# Patient Record
Sex: MALE | Race: WHITE | NOT HISPANIC OR LATINO | Employment: OTHER | URBAN - METROPOLITAN AREA
[De-identification: names, ages, dates, MRNs, and addresses within clinical notes are randomized per-mention and may not be internally consistent; named-entity substitution may affect disease eponyms.]

---

## 2021-07-08 ENCOUNTER — HOSPITAL ENCOUNTER (INPATIENT)
Facility: HOSPITAL | Age: 76
LOS: 6 days | Discharge: HOME | End: 2021-07-14
Attending: EMERGENCY MEDICINE | Admitting: INTERNAL MEDICINE

## 2021-07-08 DIAGNOSIS — I21.01 ST ELEVATION MYOCARDIAL INFARCTION INVOLVING LEFT MAIN CORONARY ARTERY (HCC): ICD-10-CM

## 2021-07-08 DIAGNOSIS — I21.3 ST ELEVATION MYOCARDIAL INFARCTION (STEMI), UNSPECIFIED ARTERY (HCC): Primary | ICD-10-CM

## 2021-07-08 LAB
ACT BLD: 169 SECONDS (ref 89–137)
ACT BLD: 257 SECONDS (ref 89–137)
ACT BLD: 268 SECONDS (ref 89–137)
ACT BLD: 279 SECONDS (ref 89–137)
ALBUMIN SERPL-MCNC: 5 G/DL (ref 3.5–5.2)
ALBUMIN/GLOB SERPL: 1.7 G/DL
ALP SERPL-CCNC: 71 U/L (ref 39–117)
ALT SERPL W P-5'-P-CCNC: 35 U/L (ref 1–41)
ANION GAP SERPL CALCULATED.3IONS-SCNC: 16 MMOL/L (ref 5–15)
APTT PPP: 27.6 SECONDS (ref 61–76.5)
AST SERPL-CCNC: 96 U/L (ref 1–40)
BASOPHILS # BLD AUTO: 0 10*3/MM3 (ref 0–0.2)
BASOPHILS NFR BLD AUTO: 0.3 % (ref 0–1.5)
BILIRUB SERPL-MCNC: 1 MG/DL (ref 0–1.2)
BUN SERPL-MCNC: 8 MG/DL (ref 8–23)
BUN/CREAT SERPL: 10.4 (ref 7–25)
CALCIUM SPEC-SCNC: 9.3 MG/DL (ref 8.6–10.5)
CHLORIDE SERPL-SCNC: 96 MMOL/L (ref 98–107)
CO2 SERPL-SCNC: 27 MMOL/L (ref 22–29)
CREAT SERPL-MCNC: 0.77 MG/DL (ref 0.76–1.27)
DEPRECATED RDW RBC AUTO: 42 FL (ref 37–54)
EOSINOPHIL # BLD AUTO: 0 10*3/MM3 (ref 0–0.4)
EOSINOPHIL NFR BLD AUTO: 0.1 % (ref 0.3–6.2)
ERYTHROCYTE [DISTWIDTH] IN BLOOD BY AUTOMATED COUNT: 13.4 % (ref 12.3–15.4)
GFR SERPL CREATININE-BSD FRML MDRD: 98 ML/MIN/1.73
GLOBULIN UR ELPH-MCNC: 2.9 GM/DL
GLUCOSE SERPL-MCNC: 131 MG/DL (ref 65–99)
HCT VFR BLD AUTO: 46.3 % (ref 37.5–51)
HGB BLD-MCNC: 15.8 G/DL (ref 13–17.7)
HOLD SPECIMEN: NORMAL
HOLD SPECIMEN: NORMAL
INR PPP: 1.06 (ref 0.93–1.1)
LYMPHOCYTES # BLD AUTO: 0.8 10*3/MM3 (ref 0.7–3.1)
LYMPHOCYTES NFR BLD AUTO: 7 % (ref 19.6–45.3)
MAGNESIUM SERPL-MCNC: 1.9 MG/DL (ref 1.6–2.4)
MCH RBC QN AUTO: 30.2 PG (ref 26.6–33)
MCHC RBC AUTO-ENTMCNC: 34.1 G/DL (ref 31.5–35.7)
MCV RBC AUTO: 88.3 FL (ref 79–97)
MONOCYTES # BLD AUTO: 0.9 10*3/MM3 (ref 0.1–0.9)
MONOCYTES NFR BLD AUTO: 7.4 % (ref 5–12)
NEUTROPHILS NFR BLD AUTO: 10.2 10*3/MM3 (ref 1.7–7)
NEUTROPHILS NFR BLD AUTO: 85.2 % (ref 42.7–76)
NRBC BLD AUTO-RTO: 0 /100 WBC (ref 0–0.2)
PLATELET # BLD AUTO: 149 10*3/MM3 (ref 140–450)
PMV BLD AUTO: 8.5 FL (ref 6–12)
POTASSIUM SERPL-SCNC: 3.9 MMOL/L (ref 3.5–5.2)
PROT SERPL-MCNC: 7.9 G/DL (ref 6–8.5)
PROTHROMBIN TIME: 11.7 SECONDS (ref 9.6–11.7)
RBC # BLD AUTO: 5.24 10*6/MM3 (ref 4.14–5.8)
SARS-COV-2 RNA PNL SPEC NAA+PROBE: NOT DETECTED
SODIUM SERPL-SCNC: 139 MMOL/L (ref 136–145)
TROPONIN T SERPL-MCNC: 1.8 NG/ML (ref 0–0.03)
WBC # BLD AUTO: 12 10*3/MM3 (ref 3.4–10.8)
WHOLE BLOOD HOLD SPECIMEN: NORMAL

## 2021-07-08 PROCEDURE — 25010000002 HEPARIN (PORCINE) PER 1000 UNITS: Performed by: INTERNAL MEDICINE

## 2021-07-08 PROCEDURE — 92978 ENDOLUMINL IVUS OCT C 1ST: CPT | Performed by: INTERNAL MEDICINE

## 2021-07-08 PROCEDURE — C1725 CATH, TRANSLUMIN NON-LASER: HCPCS | Performed by: INTERNAL MEDICINE

## 2021-07-08 PROCEDURE — C1874 STENT, COATED/COV W/DEL SYS: HCPCS | Performed by: INTERNAL MEDICINE

## 2021-07-08 PROCEDURE — 92979 ENDOLUMINL IVUS OCT C EA: CPT | Performed by: INTERNAL MEDICINE

## 2021-07-08 PROCEDURE — 93005 ELECTROCARDIOGRAM TRACING: CPT | Performed by: EMERGENCY MEDICINE

## 2021-07-08 PROCEDURE — C1894 INTRO/SHEATH, NON-LASER: HCPCS | Performed by: INTERNAL MEDICINE

## 2021-07-08 PROCEDURE — 93005 ELECTROCARDIOGRAM TRACING: CPT

## 2021-07-08 PROCEDURE — 0 IOPAMIDOL PER 1 ML: Performed by: INTERNAL MEDICINE

## 2021-07-08 PROCEDURE — 99223 1ST HOSP IP/OBS HIGH 75: CPT | Performed by: INTERNAL MEDICINE

## 2021-07-08 PROCEDURE — 87635 SARS-COV-2 COVID-19 AMP PRB: CPT | Performed by: EMERGENCY MEDICINE

## 2021-07-08 PROCEDURE — 99152 MOD SED SAME PHYS/QHP 5/>YRS: CPT | Performed by: INTERNAL MEDICINE

## 2021-07-08 PROCEDURE — 85025 COMPLETE CBC W/AUTO DIFF WBC: CPT | Performed by: EMERGENCY MEDICINE

## 2021-07-08 PROCEDURE — 80053 COMPREHEN METABOLIC PANEL: CPT | Performed by: EMERGENCY MEDICINE

## 2021-07-08 PROCEDURE — C1769 GUIDE WIRE: HCPCS | Performed by: INTERNAL MEDICINE

## 2021-07-08 PROCEDURE — C1760 CLOSURE DEV, VASC: HCPCS | Performed by: INTERNAL MEDICINE

## 2021-07-08 PROCEDURE — C1753 CATH, INTRAVAS ULTRASOUND: HCPCS | Performed by: INTERNAL MEDICINE

## 2021-07-08 PROCEDURE — 027036Z DILATION OF CORONARY ARTERY, ONE ARTERY WITH THREE DRUG-ELUTING INTRALUMINAL DEVICES, PERCUTANEOUS APPROACH: ICD-10-PCS | Performed by: INTERNAL MEDICINE

## 2021-07-08 PROCEDURE — 93458 L HRT ARTERY/VENTRICLE ANGIO: CPT | Performed by: INTERNAL MEDICINE

## 2021-07-08 PROCEDURE — 99153 MOD SED SAME PHYS/QHP EA: CPT | Performed by: INTERNAL MEDICINE

## 2021-07-08 PROCEDURE — 92941 PRQ TRLML REVSC TOT OCCL AMI: CPT | Performed by: INTERNAL MEDICINE

## 2021-07-08 PROCEDURE — 99283 EMERGENCY DEPT VISIT LOW MDM: CPT

## 2021-07-08 PROCEDURE — 85730 THROMBOPLASTIN TIME PARTIAL: CPT | Performed by: EMERGENCY MEDICINE

## 2021-07-08 PROCEDURE — 85347 COAGULATION TIME ACTIVATED: CPT

## 2021-07-08 PROCEDURE — C1887 CATHETER, GUIDING: HCPCS | Performed by: INTERNAL MEDICINE

## 2021-07-08 PROCEDURE — C9606 PERC D-E COR REVASC W AMI S: HCPCS | Performed by: INTERNAL MEDICINE

## 2021-07-08 PROCEDURE — 85610 PROTHROMBIN TIME: CPT | Performed by: EMERGENCY MEDICINE

## 2021-07-08 PROCEDURE — 4A023N7 MEASUREMENT OF CARDIAC SAMPLING AND PRESSURE, LEFT HEART, PERCUTANEOUS APPROACH: ICD-10-PCS | Performed by: INTERNAL MEDICINE

## 2021-07-08 PROCEDURE — B2151ZZ FLUOROSCOPY OF LEFT HEART USING LOW OSMOLAR CONTRAST: ICD-10-PCS | Performed by: INTERNAL MEDICINE

## 2021-07-08 PROCEDURE — B2111ZZ FLUOROSCOPY OF MULTIPLE CORONARY ARTERIES USING LOW OSMOLAR CONTRAST: ICD-10-PCS | Performed by: INTERNAL MEDICINE

## 2021-07-08 PROCEDURE — 83735 ASSAY OF MAGNESIUM: CPT | Performed by: EMERGENCY MEDICINE

## 2021-07-08 PROCEDURE — 84484 ASSAY OF TROPONIN QUANT: CPT | Performed by: EMERGENCY MEDICINE

## 2021-07-08 DEVICE — XIENCE SIERRA™ EVEROLIMUS ELUTING CORONARY STENT SYSTEM 3.50 MM X 28 MM / RAPID-EXCHANGE
Type: IMPLANTABLE DEVICE | Status: FUNCTIONAL
Brand: XIENCE SIERRA™

## 2021-07-08 RX ORDER — CARVEDILOL 3.12 MG/1
3.12 TABLET ORAL EVERY 12 HOURS SCHEDULED
Status: DISCONTINUED | OUTPATIENT
Start: 2021-07-08 | End: 2021-07-09

## 2021-07-08 RX ORDER — ONDANSETRON 2 MG/ML
4 INJECTION INTRAMUSCULAR; INTRAVENOUS ONCE
Status: DISCONTINUED | OUTPATIENT
Start: 2021-07-08 | End: 2021-07-14 | Stop reason: HOSPADM

## 2021-07-08 RX ORDER — ACETAMINOPHEN 325 MG/1
650 TABLET ORAL EVERY 4 HOURS PRN
Status: DISCONTINUED | OUTPATIENT
Start: 2021-07-08 | End: 2021-07-14 | Stop reason: HOSPADM

## 2021-07-08 RX ORDER — HEPARIN SODIUM 1000 [USP'U]/ML
INJECTION, SOLUTION INTRAVENOUS; SUBCUTANEOUS AS NEEDED
Status: DISCONTINUED | OUTPATIENT
Start: 2021-07-08 | End: 2021-07-08 | Stop reason: HOSPADM

## 2021-07-08 RX ORDER — ASPIRIN 81 MG/1
81 TABLET, CHEWABLE ORAL DAILY
Status: DISCONTINUED | OUTPATIENT
Start: 2021-07-09 | End: 2021-07-14 | Stop reason: HOSPADM

## 2021-07-08 RX ORDER — ATORVASTATIN CALCIUM 20 MG/1
20 TABLET, FILM COATED ORAL NIGHTLY
Status: DISCONTINUED | OUTPATIENT
Start: 2021-07-08 | End: 2021-07-14 | Stop reason: HOSPADM

## 2021-07-08 RX ORDER — ONDANSETRON 2 MG/ML
INJECTION INTRAMUSCULAR; INTRAVENOUS
Status: DISPENSED
Start: 2021-07-08 | End: 2021-07-09

## 2021-07-08 RX ORDER — FENTANYL CITRATE 50 UG/ML
INJECTION, SOLUTION INTRAMUSCULAR; INTRAVENOUS
Status: DISPENSED
Start: 2021-07-08 | End: 2021-07-09

## 2021-07-08 RX ORDER — HEPARIN SODIUM 10000 [USP'U]/100ML
11.9 INJECTION, SOLUTION INTRAVENOUS
Status: DISCONTINUED | OUTPATIENT
Start: 2021-07-08 | End: 2021-07-08

## 2021-07-08 RX ORDER — LIDOCAINE HYDROCHLORIDE 20 MG/ML
INJECTION, SOLUTION INFILTRATION; PERINEURAL AS NEEDED
Status: DISCONTINUED | OUTPATIENT
Start: 2021-07-08 | End: 2021-07-08 | Stop reason: HOSPADM

## 2021-07-08 RX ORDER — NITROGLYCERIN 20 MG/100ML
10-50 INJECTION INTRAVENOUS
Status: DISCONTINUED | OUTPATIENT
Start: 2021-07-08 | End: 2021-07-09

## 2021-07-08 RX ORDER — ASPIRIN 81 MG/1
TABLET, CHEWABLE ORAL
Status: DISPENSED
Start: 2021-07-08 | End: 2021-07-09

## 2021-07-08 RX ORDER — HEPARIN SODIUM 1000 [USP'U]/ML
4000 INJECTION, SOLUTION INTRAVENOUS; SUBCUTANEOUS ONCE
Status: DISCONTINUED | OUTPATIENT
Start: 2021-07-08 | End: 2021-07-11

## 2021-07-08 RX ORDER — LISINOPRIL 5 MG/1
5 TABLET ORAL
Status: DISCONTINUED | OUTPATIENT
Start: 2021-07-09 | End: 2021-07-09

## 2021-07-08 RX ORDER — SODIUM CHLORIDE 9 MG/ML
50 INJECTION, SOLUTION INTRAVENOUS CONTINUOUS
Status: DISCONTINUED | OUTPATIENT
Start: 2021-07-08 | End: 2021-07-09

## 2021-07-08 RX ORDER — NITROGLYCERIN 20 MG/100ML
INJECTION INTRAVENOUS
Status: DISPENSED
Start: 2021-07-08 | End: 2021-07-09

## 2021-07-08 RX ORDER — FENTANYL CITRATE 50 UG/ML
50 INJECTION, SOLUTION INTRAMUSCULAR; INTRAVENOUS ONCE
Status: DISCONTINUED | OUTPATIENT
Start: 2021-07-08 | End: 2021-07-14 | Stop reason: HOSPADM

## 2021-07-08 RX ORDER — SODIUM CHLORIDE 0.9 % (FLUSH) 0.9 %
10 SYRINGE (ML) INJECTION AS NEEDED
Status: DISCONTINUED | OUTPATIENT
Start: 2021-07-08 | End: 2021-07-14 | Stop reason: HOSPADM

## 2021-07-08 RX ORDER — EPTIFIBATIDE 0.75 MG/ML
INJECTION, SOLUTION INTRAVENOUS
Status: DISCONTINUED
Start: 2021-07-08 | End: 2021-07-08 | Stop reason: WASHOUT

## 2021-07-08 RX ORDER — ASPIRIN 81 MG/1
243 TABLET, CHEWABLE ORAL ONCE
Status: DISCONTINUED | OUTPATIENT
Start: 2021-07-08 | End: 2021-07-14 | Stop reason: HOSPADM

## 2021-07-08 RX ORDER — HEPARIN SODIUM 1000 [USP'U]/ML
INJECTION, SOLUTION INTRAVENOUS; SUBCUTANEOUS
Status: DISPENSED
Start: 2021-07-08 | End: 2021-07-09

## 2021-07-08 RX ADMIN — ATORVASTATIN CALCIUM 20 MG: 20 TABLET, FILM COATED ORAL at 21:46

## 2021-07-08 RX ADMIN — CARVEDILOL 3.12 MG: 3.12 TABLET, FILM COATED ORAL at 21:46

## 2021-07-08 NOTE — H&P
cardiology H&P  Shay Mirza MD, PhD  [facility] cardiology    No care team member to display    CHIEF COMPLAINT: Chest pain, anterior STEMI    HISTORY OF PRESENT ILLNESS:    This very pleasant 76-year-old gentleman who presented to the ER with acute onset of chest pain finding anterolateral ST elevation concerning for myocardial infarction the setting of chest pain. Patient was taken emergently to the Cath Lab demonstrating 99% stenosis of the mid LAD status post intervention with Xience 3.5 x 28 drug-eluting stent postdilated to 4.2 and 4.6 mm distal proximal respectively. He did well. Ostium of the left main was also viewed to be stenotic however intravascular ultrasound revealed cross-sectional area most narrowed at 6.4 mm² and 6.8 mm^2 on 2 successive measurements not meeting criteria for intervention. Patient left the Cath Lab hemodynamically electrically stable. He is being transferred to ICU for continued care. Telemetry monitoring standard post MI guideline directed therapy    Historically patient acts younger than he really as per family, mows the yard in 90 degree heat, takes no medicines at home, has otherwise been healthy    Non-smoker not diabetic    Review of systems otherwise negative x14 point review of systems except as mentioned above    Historical data copied forward from previous encounters and EMR is unchanged      No past medical history on file.  No past surgical history on file.  No family history on file.  Social History     Tobacco Use   • Smoking status: Not on file   Substance Use Topics   • Alcohol use: Not on file   • Drug use: Not on file     No medications prior to admission.     Allergies:  Patient has no known allergies.    REVIEW OF SYSTEMS:  Please see the above history of present illness for pertinent positives and negatives.  The remainder of the patient's systems have been reviewed and are negative.     Vital Signs  Temp:  [97.7 °F (36.5 °C)] 97.7 °F (36.5 °C)  Heart  "Rate:  [70] 70  Resp:  [16] 16  BP: (152-182)/(79-92) 152/92    Flowsheet Rows      First Filed Value   Admission Height  172.7 cm (68\") Documented at 07/08/2021 1658   Admission Weight  83.9 kg (185 lb) Documented at 07/08/2021 1658           Physical Exam:  Physical Exam   Constitutional: Patient appears well-developed appears younger than stated age and well-nourished and mild distress on initial encounter  HEENT:   Head: Normocephalic and atraumatic.   Eyes:  Pupils are equal, round, and reactive to light. EOM are intact. Sclerae are anicteric and noninjected.  Mouth and Throat: Patient has moist mucous membranes. Oropharynx is clear of any erythema or exudate.     Neck: Neck supple. No JVD present. No thyromegaly present. No lymphadenopathy present.  Cardiovascular: Regular rate, regular rhythm, S1 normal and S2 normal.  Exam reveals no gallop and no friction rub.  No murmur heard auscultation.  Pulmonary/Chest: Lungs are clear to auscultation bilaterally. No respiratory distress. No wheezes. No rhonchi. No rales.   Abdominal: Soft. Bowel sounds are normal. No distension and no mass. There is no hepatosplenomegaly. There is no tenderness.   Musculoskeletal: Normal muscle tone  Extremities: No edema. Pulses are palpable in all 4 extremities.  Neurological: Patient is alert and oriented to person, place, and time. Cranial nerves II-XII are grossly intact with no focal deficits.  Skin: Skin is warm. No rash noted. Nails show no clubbing.  No cyanosis or erythema.    Right inguinal hernia present nonincarcerated     Results Review:    I reviewed the patient's new clinical results.  Lab Results (most recent)     Procedure Component Value Units Date/Time    POC Activated Clotting Time [[id number]]  (Abnormal) Collected: 07/08/21 1905    Specimen: Arterial Blood Updated: 07/08/21 1917     Activated Clotting Time  268 Seconds      Comment: Serial Number: [id number]:  [id number]       POC Activated Clotting Time " [[id number]]  (Abnormal) Collected: 07/08/21 1847    Specimen: Arterial Blood Updated: 07/08/21 1917     Activated Clotting Time  257 Seconds      Comment: Serial Number: [id number]:  [id number]       aPTT [[id number]]  (Abnormal) Collected: 07/08/21 1734    Specimen: Blood Updated: 07/08/21 1853     PTT 27.6 seconds     [place] Draw [[id number]] Collected: 07/08/21 1734    Specimen: Blood Updated: 07/08/21 1845    Narrative:      The following orders were created for panel order [place] Draw.  Procedure                               Abnormality         Status                     ---------                               -----------         ------                     Green Top (Gel)[[id number]]                                  Final result               Lavender Top[[id number]]                                     Final result               Gold Top - SST[[id number]]                                   Final result                 Please view results for these tests on the individual orders.    Lavender Top [[id number]] Collected: 07/08/21 1734    Specimen: Blood Updated: 07/08/21 1845     Extra Tube hold for add-on     Comment: Auto resulted       Gold Top - SST [[id number]] Collected: 07/08/21 1734    Specimen: Blood Updated: 07/08/21 1845     Extra Tube Hold for add-ons.     Comment: Auto resulted.       Green Top (Gel) [[id number]] Collected: 07/08/21 1734    Specimen: Blood Updated: 07/08/21 1845     Extra Tube Hold for add-ons.     Comment: Auto resulted.       Troponin [[id number]]  (Abnormal) Collected: 07/08/21 1734    Specimen: Blood Updated: 07/08/21 1839     Troponin T 1.800 ng/mL     Narrative:      Troponin T Reference Range:  <= 0.03 ng/mL-   Negative for AMI  >0.03 ng/mL-     Abnormal for myocardial necrosis.  Clinicians would have to utilize clinical acumen, EKG, Troponin and serial changes to determine if it is an Acute Myocardial Infarction or myocardial injury due to an underlying chronic condition.        Results may be falsely decreased if patient taking Biotin.      Comprehensive Metabolic Panel [[id number]]  (Abnormal) Collected: 07/08/21 1734    Specimen: Blood Updated: 07/08/21 1832     Glucose 131 mg/dL      BUN 8 mg/dL      Creatinine 0.77 mg/dL      Sodium 139 mmol/L      Potassium 3.9 mmol/L      Chloride 96 mmol/L      CO2 27.0 mmol/L      Calcium 9.3 mg/dL      Total Protein 7.9 g/dL      Albumin 5.00 g/dL      ALT (SGPT) 35 U/L      AST (SGOT) 96 U/L      Alkaline Phosphatase 71 U/L      Total Bilirubin 1.0 mg/dL      eGFR Non African Amer 98 mL/min/1.73      Globulin 2.9 gm/dL      A/G Ratio 1.7 g/dL      BUN/Creatinine Ratio 10.4     Anion Gap 16.0 mmol/L     Narrative:      GFR Normal >60  Chronic Kidney Disease <60  Kidney Failure <15      Magnesium [[id number]]  (Normal) Collected: 07/08/21 1734    Specimen: Blood Updated: 07/08/21 1832     Magnesium 1.9 mg/dL     COVID PRE-OP / PRE-PROCEDURE SCREENING ORDER (NO ISOLATION) - Swab, Nasopharynx [[id number]]  (Normal) Collected: 07/08/21 1733    Specimen: Swab from Nasopharynx Updated: 07/08/21 1758    Narrative:      The following orders were created for panel order COVID PRE-OP / PRE-PROCEDURE SCREENING ORDER (NO ISOLATION) - Swab, Nasopharynx.  Procedure                               Abnormality         Status                     ---------                               -----------         ------                     COVID-19,CEPHEID,COR/DEVIN...[[id number]]  Normal              Final result                 Please view results for these tests on the individual orders.    COVID-19,CEPHEID,COR/DEVIN/PAD/JIMMY IN-HOUSE(OR EMERGENT/ADD-ON),NP SWAB IN TRANSPORT MEDIA 3-4 HR TAT, RT-PCR - Swab, Nasopharynx [[id number]]  (Normal) Collected: 07/08/21 1733    Specimen: Swab from Nasopharynx Updated: 07/08/21 1758     COVID19 Not Detected    Narrative:      Fact sheet for providers: https://www.fda.gov/media/455144/download     Fact sheet for patients:  https://www.fda.gov/media/129678/download  Fact sheet for providers: https://www.fda.gov/media/738438/download    Fact sheet for patients: https://www.fda.gov/media/933986/download    Test performed by PCR.    Protime-INR [[id number]]  (Normal) Collected: 07/08/21 1734    Specimen: Blood Updated: 07/08/21 1758     Protime 11.7 Seconds      INR 1.06    CBC & Differential [[id number]]  (Abnormal) Collected: 07/08/21 1734    Specimen: Blood Updated: 07/08/21 1748    Narrative:      The following orders were created for panel order CBC & Differential.  Procedure                               Abnormality         Status                     ---------                               -----------         ------                     CBC Auto Differential[[id number]]        Abnormal            Final result                 Please view results for these tests on the individual orders.    CBC Auto Differential [[id number]]  (Abnormal) Collected: 07/08/21 1734    Specimen: Blood Updated: 07/08/21 1748     WBC 12.00 10*3/mm3      RBC 5.24 10*6/mm3      Hemoglobin 15.8 g/dL      Hematocrit 46.3 %      MCV 88.3 fL      MCH 30.2 pg      MCHC 34.1 g/dL      RDW 13.4 %      RDW-SD 42.0 fl      MPV 8.5 fL      Platelets 149 10*3/mm3      Neutrophil % 85.2 %      Lymphocyte % 7.0 %      Monocyte % 7.4 %      Eosinophil % 0.1 %      Basophil % 0.3 %      Neutrophils, Absolute 10.20 10*3/mm3      Lymphocytes, Absolute 0.80 10*3/mm3      Monocytes, Absolute 0.90 10*3/mm3      Eosinophils, Absolute 0.00 10*3/mm3      Basophils, Absolute 0.00 10*3/mm3      nRBC 0.0 /100 WBC           Imaging Results (Most Recent)     None        reviewed    ECG/EMG Results (most recent)     Procedure Component Value Units Date/Time    ECG 12 Lead [[id number]] Collected: 07/08/21 1717     Updated: 07/08/21 1718     QT Interval 377 ms     Narrative:      HEART RATE= 64  bpm  RR Interval= 944  ms  IN Interval= 228  ms  P Horizontal Axis= 20  deg  P Front Axis= 18   deg  QRSD Interval= 93  ms  QT Interval= 377  ms  QRS Axis= 38  deg  T Wave Axis= -3  deg  - ABNORMAL ECG -  Sinus rhythm  Prolonged CT interval  ST elevation, consider anterolateral injury  No previous ECG available for comparison  Electronically Signed By:   Date and Time of Study: 2021-07-08 17:17:01        reviewed    Assessment/Plan     Anterior STEMI  Status post Xience 3.5 x 28 drug-eluting stent postdilated to 4.2 and 4.6 mm respectively distal to proximal  Dual antiplatelet therapy with aspirin and ticagrelor will be continued  Statin therapy with atorvastatin 20 daily  Get fasting lipid panel  Add Coreg 3.125 twice daily after anterior infarct and adequate blood pressure and no signs of shock, Killip class I  Titrate medicines per guidelines as allowed by hemodynamics  Add ACE inhibitor or Aldactone for anterior MI when able    Blood pressure goal is less than 135 systolic  Nitroglycerin drip if needed  Hydralazine given acutely in the ER    Goal LDL less than 70    Secondary prevention goals moving forward    Observe in ICU, continue telemetry, trend cardiac enzymes, get 2D echo for structural functional evaluation after MI    Further recommendations based on clinical course    Shay Mirza MD, PhD    I discussed the patient's findings and my recommendations with patient and staff    Shay Mirza MD  07/08/21  19:55 EDT    New patient to me with new problems above

## 2021-07-08 NOTE — ED PROVIDER NOTES
Subjective   History of Present Illness  Context: 76-year-old male presents with chest pain.  He states that started last night.  He states it was severe enough today he could no longer tolerate it.  He had some shortness of breath no nausea or diaphoresis.  He had some radiation of pain towards the shoulders.  He has had no problems with chest pain prior.  Pain is worse with exertion.  Location: Chest  Quality: Pain  Duration: 1 day  Timing: Constant  Severity: Severe   Modifying factors: Worse with exertion he did take a baby aspirin at home  Associated signs and symptoms: Some shortness of breath    Review of Systems   Constitutional: Positive for fatigue. Negative for fever and unexpected weight change.   HENT: Positive for hearing loss. Negative for sore throat.    Eyes: Negative for visual disturbance.   Respiratory: Positive for shortness of breath. Negative for cough.    Cardiovascular: Positive for chest pain. Negative for leg swelling.   Gastrointestinal: Negative for abdominal pain, diarrhea and vomiting.   Genitourinary: Negative for dysuria.   Musculoskeletal: Negative for back pain.   Skin: Negative for rash.   Neurological: Negative for headaches.   Psychiatric/Behavioral: Negative for confusion.        No past medical history on file.  Negative  No Known Allergies    Past Surgical History:   Procedure Laterality Date   • CARDIAC CATHETERIZATION N/A 7/8/2021    Procedure: Left Heart Cath;  Surgeon: Shay Mirza MD;  Location: [facility] CATH INVASIVE LOCATION;  Service: Cardiology;  Laterality: N/A;   • CARDIAC CATHETERIZATION N/A 7/8/2021    Procedure: Percutaneous Coronary Intervention;  Surgeon: Shay Mirza MD;  Location: [facility] CATH INVASIVE LOCATION;  Service: Cardiology;  Laterality: N/A;       No family history on file.  Social history does not smoke  No routine medications           Objective   Physical Exam  76-year-old male awake alert.  Generally well-developed  well-nourished.  Pupils equal round at light.  Neck supple chest clear equal breath sounds.  Cardiovascular regular rate and rhythm.  Abdomen soft nontender.  Extremities no tenderness edema.  Skin without rash noted.  Neurologic Brian without focal findings.  Procedures           ED Course                                           MDM  Patient has no previous charts to review.  My EKG report sinus rhythm with anterolateral ST elevation   INR 1.0 CBC white count 12 with hemoglobin 13.8 platelet count of 149  Patient was given additional aspirin started on heparin given Brilinta started IV nitroglycerin given fentanyl for pain.  Patient was discussed with Dr. Mirza.  He will be taken to the Cath Lab for definitive evaluation.  Final diagnoses:   ST elevation myocardial infarction (STEMI), unspecified artery (CMS/HCC)       ED Disposition  ED Disposition     ED Disposition Condition Comment    Decision to Admit            No follow-up provider specified.       Medication List      No changes were made to your prescriptions during this visit.          Cesar Patino MD  07/09/21 0752

## 2021-07-09 ENCOUNTER — APPOINTMENT (OUTPATIENT)
Dept: CARDIOLOGY | Facility: HOSPITAL | Age: 76
End: 2021-07-09

## 2021-07-09 LAB
ACT BLD: 246 SECONDS (ref 89–137)
ALBUMIN SERPL-MCNC: 4 G/DL (ref 3.5–5.2)
ALBUMIN SERPL-MCNC: 4 G/DL (ref 3.5–5.2)
ALBUMIN/GLOB SERPL: 1.5 G/DL
ALBUMIN/GLOB SERPL: 1.5 G/DL
ALP SERPL-CCNC: 51 U/L (ref 39–117)
ALP SERPL-CCNC: 56 U/L (ref 39–117)
ALT SERPL W P-5'-P-CCNC: 35 U/L (ref 1–41)
ALT SERPL W P-5'-P-CCNC: 39 U/L (ref 1–41)
ANION GAP SERPL CALCULATED.3IONS-SCNC: 10 MMOL/L (ref 5–15)
ANION GAP SERPL CALCULATED.3IONS-SCNC: 11 MMOL/L (ref 5–15)
APTT PPP: 30.5 SECONDS (ref 61–76.5)
AST SERPL-CCNC: 109 U/L (ref 1–40)
AST SERPL-CCNC: 138 U/L (ref 1–40)
BASOPHILS # BLD AUTO: 0 10*3/MM3 (ref 0–0.2)
BASOPHILS NFR BLD AUTO: 0.3 % (ref 0–1.5)
BH CV ECHO MEAS - ACS: 2.3 CM
BH CV ECHO MEAS - AO MAX PG (FULL): 1.4 MMHG
BH CV ECHO MEAS - AO MAX PG: 7.2 MMHG
BH CV ECHO MEAS - AO MEAN PG (FULL): 1.2 MMHG
BH CV ECHO MEAS - AO MEAN PG: 4.9 MMHG
BH CV ECHO MEAS - AO ROOT AREA (BSA CORRECTED): 1.7
BH CV ECHO MEAS - AO ROOT AREA: 8.4 CM^2
BH CV ECHO MEAS - AO ROOT DIAM: 3.3 CM
BH CV ECHO MEAS - AO V2 MAX: 134.4 CM/SEC
BH CV ECHO MEAS - AO V2 MEAN: 105.5 CM/SEC
BH CV ECHO MEAS - AO V2 VTI: 27.7 CM
BH CV ECHO MEAS - ASC AORTA: 3.9 CM
BH CV ECHO MEAS - AVA(I,A): 4.3 CM^2
BH CV ECHO MEAS - AVA(I,D): 4.3 CM^2
BH CV ECHO MEAS - AVA(V,A): 3.7 CM^2
BH CV ECHO MEAS - AVA(V,D): 3.7 CM^2
BH CV ECHO MEAS - BSA(HAYCOCK): 2 M^2
BH CV ECHO MEAS - BSA: 2 M^2
BH CV ECHO MEAS - BZI_BMI: 28.3 KILOGRAMS/M^2
BH CV ECHO MEAS - BZI_METRIC_HEIGHT: 172.7 CM
BH CV ECHO MEAS - BZI_METRIC_WEIGHT: 84.4 KG
BH CV ECHO MEAS - EDV(CUBED): 70.2 ML
BH CV ECHO MEAS - EDV(MOD-SP2): 83.6 ML
BH CV ECHO MEAS - EDV(MOD-SP4): 124.7 ML
BH CV ECHO MEAS - EDV(TEICH): 75.3 ML
BH CV ECHO MEAS - EF(CUBED): 45.9 %
BH CV ECHO MEAS - EF(MOD-BP): 53 %
BH CV ECHO MEAS - EF(MOD-SP2): 53.3 %
BH CV ECHO MEAS - EF(MOD-SP4): 53.5 %
BH CV ECHO MEAS - EF(TEICH): 38.8 %
BH CV ECHO MEAS - ESV(CUBED): 38 ML
BH CV ECHO MEAS - ESV(MOD-SP2): 39.1 ML
BH CV ECHO MEAS - ESV(MOD-SP4): 58 ML
BH CV ECHO MEAS - ESV(TEICH): 46.1 ML
BH CV ECHO MEAS - FS: 18.5 %
BH CV ECHO MEAS - IVS/LVPW: 1.1
BH CV ECHO MEAS - IVSD: 1.7 CM
BH CV ECHO MEAS - LA DIMENSION(2D): 3.1 CM
BH CV ECHO MEAS - LV DIASTOLIC VOL/BSA (35-75): 62.9 ML/M^2
BH CV ECHO MEAS - LV MASS(C)D: 284.9 GRAMS
BH CV ECHO MEAS - LV MASS(C)DI: 143.8 GRAMS/M^2
BH CV ECHO MEAS - LV MAX PG: 5.8 MMHG
BH CV ECHO MEAS - LV MEAN PG: 3.7 MMHG
BH CV ECHO MEAS - LV SYSTOLIC VOL/BSA (12-30): 29.3 ML/M^2
BH CV ECHO MEAS - LV V1 MAX: 120.6 CM/SEC
BH CV ECHO MEAS - LV V1 MEAN: 91.2 CM/SEC
BH CV ECHO MEAS - LV V1 VTI: 28.6 CM
BH CV ECHO MEAS - LVIDD: 4.1 CM
BH CV ECHO MEAS - LVIDS: 3.4 CM
BH CV ECHO MEAS - LVOT AREA: 4.1 CM^2
BH CV ECHO MEAS - LVOT DIAM: 2.3 CM
BH CV ECHO MEAS - LVPWD: 1.6 CM
BH CV ECHO MEAS - MV A MAX VEL: 80.5 CM/SEC
BH CV ECHO MEAS - MV DEC SLOPE: 220.4 CM/SEC^2
BH CV ECHO MEAS - MV DEC TIME: 0.28 SEC
BH CV ECHO MEAS - MV E MAX VEL: 61.6 CM/SEC
BH CV ECHO MEAS - MV E/A: 0.77
BH CV ECHO MEAS - MV MAX PG: 2.3 MMHG
BH CV ECHO MEAS - MV MEAN PG: 1.4 MMHG
BH CV ECHO MEAS - MV V2 MAX: 75.2 CM/SEC
BH CV ECHO MEAS - MV V2 MEAN: 56.9 CM/SEC
BH CV ECHO MEAS - MV V2 VTI: 21.8 CM
BH CV ECHO MEAS - MVA(VTI): 5.4 CM^2
BH CV ECHO MEAS - PA ACC TIME: 0.13 SEC
BH CV ECHO MEAS - PA MAX PG (FULL): 1.4 MMHG
BH CV ECHO MEAS - PA MAX PG: 4.4 MMHG
BH CV ECHO MEAS - PA MEAN PG (FULL): 1.2 MMHG
BH CV ECHO MEAS - PA MEAN PG: 3.1 MMHG
BH CV ECHO MEAS - PA PR(ACCEL): 18.8 MMHG
BH CV ECHO MEAS - PA V2 MAX: 105.1 CM/SEC
BH CV ECHO MEAS - PA V2 MEAN: 85.2 CM/SEC
BH CV ECHO MEAS - PA V2 VTI: 23.6 CM
BH CV ECHO MEAS - RAP SYSTOLE: 3 MMHG
BH CV ECHO MEAS - RV MAX PG: 3 MMHG
BH CV ECHO MEAS - RV MEAN PG: 1.9 MMHG
BH CV ECHO MEAS - RV V1 MAX: 86.2 CM/SEC
BH CV ECHO MEAS - RV V1 MEAN: 65.5 CM/SEC
BH CV ECHO MEAS - RV V1 VTI: 18.7 CM
BH CV ECHO MEAS - RVDD: 2.7 CM
BH CV ECHO MEAS - RVSP: 17.8 MMHG
BH CV ECHO MEAS - SI(AO): 118.1 ML/M^2
BH CV ECHO MEAS - SI(CUBED): 16.3 ML/M^2
BH CV ECHO MEAS - SI(LVOT): 59.8 ML/M^2
BH CV ECHO MEAS - SI(MOD-SP2): 22.5 ML/M^2
BH CV ECHO MEAS - SI(MOD-SP4): 33.6 ML/M^2
BH CV ECHO MEAS - SI(TEICH): 14.7 ML/M^2
BH CV ECHO MEAS - SV(AO): 234 ML
BH CV ECHO MEAS - SV(CUBED): 32.2 ML
BH CV ECHO MEAS - SV(LVOT): 118.5 ML
BH CV ECHO MEAS - SV(MOD-SP2): 44.6 ML
BH CV ECHO MEAS - SV(MOD-SP4): 66.7 ML
BH CV ECHO MEAS - SV(TEICH): 29.2 ML
BH CV ECHO MEAS - TR MAX VEL: 192.2 CM/SEC
BILIRUB SERPL-MCNC: 1.5 MG/DL (ref 0–1.2)
BILIRUB SERPL-MCNC: 1.6 MG/DL (ref 0–1.2)
BUN SERPL-MCNC: 10 MG/DL (ref 8–23)
BUN SERPL-MCNC: 15 MG/DL (ref 8–23)
BUN/CREAT SERPL: 11.8 (ref 7–25)
BUN/CREAT SERPL: 19 (ref 7–25)
CALCIUM SPEC-SCNC: 9 MG/DL (ref 8.6–10.5)
CALCIUM SPEC-SCNC: 9 MG/DL (ref 8.6–10.5)
CHLORIDE SERPL-SCNC: 97 MMOL/L (ref 98–107)
CHLORIDE SERPL-SCNC: 99 MMOL/L (ref 98–107)
CHOLEST SERPL-MCNC: 129 MG/DL (ref 0–200)
CK SERPL-CCNC: 698 U/L (ref 20–200)
CO2 SERPL-SCNC: 26 MMOL/L (ref 22–29)
CO2 SERPL-SCNC: 28 MMOL/L (ref 22–29)
CREAT SERPL-MCNC: 0.79 MG/DL (ref 0.76–1.27)
CREAT SERPL-MCNC: 0.85 MG/DL (ref 0.76–1.27)
DEPRECATED RDW RBC AUTO: 42.4 FL (ref 37–54)
EOSINOPHIL # BLD AUTO: 0 10*3/MM3 (ref 0–0.4)
EOSINOPHIL NFR BLD AUTO: 0 % (ref 0.3–6.2)
ERYTHROCYTE [DISTWIDTH] IN BLOOD BY AUTOMATED COUNT: 13.6 % (ref 12.3–15.4)
GFR SERPL CREATININE-BSD FRML MDRD: 87 ML/MIN/1.73
GFR SERPL CREATININE-BSD FRML MDRD: 95 ML/MIN/1.73
GLOBULIN UR ELPH-MCNC: 2.6 GM/DL
GLOBULIN UR ELPH-MCNC: 2.7 GM/DL
GLUCOSE BLDC GLUCOMTR-MCNC: 140 MG/DL (ref 70–105)
GLUCOSE SERPL-MCNC: 128 MG/DL (ref 65–99)
GLUCOSE SERPL-MCNC: 129 MG/DL (ref 65–99)
HBA1C MFR BLD: 5.4 % (ref 3.5–5.6)
HCT VFR BLD AUTO: 42.7 % (ref 37.5–51)
HDLC SERPL-MCNC: 37 MG/DL (ref 40–60)
HGB BLD-MCNC: 14.5 G/DL (ref 13–17.7)
LDLC SERPL CALC-MCNC: 79 MG/DL (ref 0–100)
LDLC/HDLC SERPL: 2.16 {RATIO}
LV EF 2D ECHO EST: 45 %
LYMPHOCYTES # BLD AUTO: 0.8 10*3/MM3 (ref 0.7–3.1)
LYMPHOCYTES NFR BLD AUTO: 6.8 % (ref 19.6–45.3)
MAGNESIUM SERPL-MCNC: 1.9 MG/DL (ref 1.6–2.4)
MCH RBC QN AUTO: 30.1 PG (ref 26.6–33)
MCHC RBC AUTO-ENTMCNC: 34 G/DL (ref 31.5–35.7)
MCV RBC AUTO: 88.4 FL (ref 79–97)
MONOCYTES # BLD AUTO: 1.5 10*3/MM3 (ref 0.1–0.9)
MONOCYTES NFR BLD AUTO: 12.6 % (ref 5–12)
NEUTROPHILS NFR BLD AUTO: 80.3 % (ref 42.7–76)
NEUTROPHILS NFR BLD AUTO: 9.6 10*3/MM3 (ref 1.7–7)
NRBC BLD AUTO-RTO: 0.1 /100 WBC (ref 0–0.2)
PLATELET # BLD AUTO: 146 10*3/MM3 (ref 140–450)
PMV BLD AUTO: 8.8 FL (ref 6–12)
POTASSIUM SERPL-SCNC: 4 MMOL/L (ref 3.5–5.2)
POTASSIUM SERPL-SCNC: 4.1 MMOL/L (ref 3.5–5.2)
PROT SERPL-MCNC: 6.6 G/DL (ref 6–8.5)
PROT SERPL-MCNC: 6.7 G/DL (ref 6–8.5)
QT INTERVAL: 377 MS
RBC # BLD AUTO: 4.83 10*6/MM3 (ref 4.14–5.8)
SODIUM SERPL-SCNC: 135 MMOL/L (ref 136–145)
SODIUM SERPL-SCNC: 136 MMOL/L (ref 136–145)
TRIGL SERPL-MCNC: 60 MG/DL (ref 0–150)
TROPONIN T SERPL-MCNC: 2.44 NG/ML (ref 0–0.03)
TROPONIN T SERPL-MCNC: 2.72 NG/ML (ref 0–0.03)
VLDLC SERPL-MCNC: 13 MG/DL (ref 5–40)
WBC # BLD AUTO: 12 10*3/MM3 (ref 3.4–10.8)

## 2021-07-09 PROCEDURE — 84484 ASSAY OF TROPONIN QUANT: CPT | Performed by: INTERNAL MEDICINE

## 2021-07-09 PROCEDURE — 0 IOPAMIDOL PER 1 ML: Performed by: INTERNAL MEDICINE

## 2021-07-09 PROCEDURE — 93306 TTE W/DOPPLER COMPLETE: CPT

## 2021-07-09 PROCEDURE — 25010000002 MIDAZOLAM PER 1 MG: Performed by: INTERNAL MEDICINE

## 2021-07-09 PROCEDURE — B2151ZZ FLUOROSCOPY OF LEFT HEART USING LOW OSMOLAR CONTRAST: ICD-10-PCS | Performed by: INTERNAL MEDICINE

## 2021-07-09 PROCEDURE — 83036 HEMOGLOBIN GLYCOSYLATED A1C: CPT | Performed by: INTERNAL MEDICINE

## 2021-07-09 PROCEDURE — 85025 COMPLETE CBC W/AUTO DIFF WBC: CPT | Performed by: INTERNAL MEDICINE

## 2021-07-09 PROCEDURE — 99152 MOD SED SAME PHYS/QHP 5/>YRS: CPT | Performed by: INTERNAL MEDICINE

## 2021-07-09 PROCEDURE — 82962 GLUCOSE BLOOD TEST: CPT

## 2021-07-09 PROCEDURE — 25010000002 EPTIFIBATIDE PER 5 MG: Performed by: INTERNAL MEDICINE

## 2021-07-09 PROCEDURE — C1760 CLOSURE DEV, VASC: HCPCS | Performed by: INTERNAL MEDICINE

## 2021-07-09 PROCEDURE — 25010000002 HEPARIN (PORCINE) PER 1000 UNITS: Performed by: INTERNAL MEDICINE

## 2021-07-09 PROCEDURE — 25010000002 MORPHINE PER 10 MG: Performed by: INTERNAL MEDICINE

## 2021-07-09 PROCEDURE — C1887 CATHETER, GUIDING: HCPCS | Performed by: INTERNAL MEDICINE

## 2021-07-09 PROCEDURE — 80053 COMPREHEN METABOLIC PANEL: CPT | Performed by: INTERNAL MEDICINE

## 2021-07-09 PROCEDURE — 85730 THROMBOPLASTIN TIME PARTIAL: CPT | Performed by: INTERNAL MEDICINE

## 2021-07-09 PROCEDURE — 93010 ELECTROCARDIOGRAM REPORT: CPT | Performed by: INTERNAL MEDICINE

## 2021-07-09 PROCEDURE — 99153 MOD SED SAME PHYS/QHP EA: CPT | Performed by: INTERNAL MEDICINE

## 2021-07-09 PROCEDURE — C1894 INTRO/SHEATH, NON-LASER: HCPCS | Performed by: INTERNAL MEDICINE

## 2021-07-09 PROCEDURE — 99233 SBSQ HOSP IP/OBS HIGH 50: CPT | Performed by: INTERNAL MEDICINE

## 2021-07-09 PROCEDURE — C1725 CATH, TRANSLUMIN NON-LASER: HCPCS | Performed by: INTERNAL MEDICINE

## 2021-07-09 PROCEDURE — 93005 ELECTROCARDIOGRAM TRACING: CPT | Performed by: INTERNAL MEDICINE

## 2021-07-09 PROCEDURE — 93306 TTE W/DOPPLER COMPLETE: CPT | Performed by: INTERNAL MEDICINE

## 2021-07-09 PROCEDURE — 4A023N7 MEASUREMENT OF CARDIAC SAMPLING AND PRESSURE, LEFT HEART, PERCUTANEOUS APPROACH: ICD-10-PCS | Performed by: INTERNAL MEDICINE

## 2021-07-09 PROCEDURE — 36415 COLL VENOUS BLD VENIPUNCTURE: CPT | Performed by: INTERNAL MEDICINE

## 2021-07-09 PROCEDURE — 85347 COAGULATION TIME ACTIVATED: CPT

## 2021-07-09 PROCEDURE — 83735 ASSAY OF MAGNESIUM: CPT | Performed by: INTERNAL MEDICINE

## 2021-07-09 PROCEDURE — 93458 L HRT ARTERY/VENTRICLE ANGIO: CPT | Performed by: INTERNAL MEDICINE

## 2021-07-09 PROCEDURE — 80061 LIPID PANEL: CPT | Performed by: INTERNAL MEDICINE

## 2021-07-09 PROCEDURE — C1769 GUIDE WIRE: HCPCS | Performed by: INTERNAL MEDICINE

## 2021-07-09 PROCEDURE — 82550 ASSAY OF CK (CPK): CPT | Performed by: INTERNAL MEDICINE

## 2021-07-09 PROCEDURE — 25010000002 FENTANYL CITRATE (PF) 100 MCG/2ML SOLUTION: Performed by: INTERNAL MEDICINE

## 2021-07-09 PROCEDURE — 25010000002 EPTIFIBATIDE 20 MG/10ML SOLUTION: Performed by: INTERNAL MEDICINE

## 2021-07-09 PROCEDURE — B2111ZZ FLUOROSCOPY OF MULTIPLE CORONARY ARTERIES USING LOW OSMOLAR CONTRAST: ICD-10-PCS | Performed by: INTERNAL MEDICINE

## 2021-07-09 RX ORDER — MORPHINE SULFATE 4 MG/ML
1 INJECTION, SOLUTION INTRAMUSCULAR; INTRAVENOUS EVERY 6 HOURS PRN
Status: DISCONTINUED | OUTPATIENT
Start: 2021-07-09 | End: 2021-07-14 | Stop reason: HOSPADM

## 2021-07-09 RX ORDER — EPTIFIBATIDE 0.75 MG/ML
2 INJECTION, SOLUTION INTRAVENOUS CONTINUOUS
Status: DISCONTINUED | OUTPATIENT
Start: 2021-07-09 | End: 2021-07-09

## 2021-07-09 RX ORDER — LIDOCAINE HYDROCHLORIDE 20 MG/ML
INJECTION, SOLUTION INFILTRATION; PERINEURAL AS NEEDED
Status: DISCONTINUED | OUTPATIENT
Start: 2021-07-09 | End: 2021-07-09 | Stop reason: HOSPADM

## 2021-07-09 RX ORDER — EPTIFIBATIDE 0.75 MG/ML
2 INJECTION, SOLUTION INTRAVENOUS CONTINUOUS
Status: DISPENSED | OUTPATIENT
Start: 2021-07-09 | End: 2021-07-10

## 2021-07-09 RX ORDER — MIDAZOLAM HYDROCHLORIDE 1 MG/ML
INJECTION INTRAMUSCULAR; INTRAVENOUS AS NEEDED
Status: DISCONTINUED | OUTPATIENT
Start: 2021-07-09 | End: 2021-07-09 | Stop reason: HOSPADM

## 2021-07-09 RX ORDER — EPTIFIBATIDE 0.75 MG/ML
1 INJECTION, SOLUTION INTRAVENOUS CONTINUOUS
Status: DISCONTINUED | OUTPATIENT
Start: 2021-07-09 | End: 2021-07-09

## 2021-07-09 RX ORDER — HEPARIN SODIUM 1000 [USP'U]/ML
INJECTION, SOLUTION INTRAVENOUS; SUBCUTANEOUS AS NEEDED
Status: DISCONTINUED | OUTPATIENT
Start: 2021-07-09 | End: 2021-07-09 | Stop reason: HOSPADM

## 2021-07-09 RX ORDER — EPTIFIBATIDE 2 MG/ML
INJECTION, SOLUTION INTRAVENOUS AS NEEDED
Status: DISCONTINUED | OUTPATIENT
Start: 2021-07-09 | End: 2021-07-09 | Stop reason: HOSPADM

## 2021-07-09 RX ORDER — SPIRONOLACTONE 25 MG/1
12.5 TABLET ORAL DAILY
Status: DISCONTINUED | OUTPATIENT
Start: 2021-07-09 | End: 2021-07-12

## 2021-07-09 RX ORDER — FENTANYL CITRATE 50 UG/ML
INJECTION, SOLUTION INTRAMUSCULAR; INTRAVENOUS AS NEEDED
Status: DISCONTINUED | OUTPATIENT
Start: 2021-07-09 | End: 2021-07-09 | Stop reason: HOSPADM

## 2021-07-09 RX ORDER — ACETAMINOPHEN 325 MG/1
650 TABLET ORAL EVERY 4 HOURS PRN
Status: DISCONTINUED | OUTPATIENT
Start: 2021-07-09 | End: 2021-07-09

## 2021-07-09 RX ADMIN — ATORVASTATIN CALCIUM 20 MG: 20 TABLET, FILM COATED ORAL at 21:06

## 2021-07-09 RX ADMIN — TICAGRELOR 90 MG: 90 TABLET ORAL at 21:06

## 2021-07-09 RX ADMIN — MORPHINE SULFATE 1 MG: 4 INJECTION INTRAVENOUS at 16:29

## 2021-07-09 RX ADMIN — LISINOPRIL 5 MG: 5 TABLET ORAL at 07:56

## 2021-07-09 RX ADMIN — MORPHINE SULFATE 1 MG: 4 INJECTION INTRAVENOUS at 21:24

## 2021-07-09 RX ADMIN — CARVEDILOL 3.12 MG: 3.12 TABLET, FILM COATED ORAL at 07:56

## 2021-07-09 RX ADMIN — TICAGRELOR 90 MG: 90 TABLET ORAL at 07:56

## 2021-07-09 RX ADMIN — EPTIFIBATIDE 2 MCG/KG/MIN: 0.75 INJECTION INTRAVENOUS at 14:34

## 2021-07-09 RX ADMIN — EPTIFIBATIDE 2 MCG/KG/MIN: 0.75 INJECTION INTRAVENOUS at 18:48

## 2021-07-09 RX ADMIN — SPIRONOLACTONE 12.5 MG: 25 TABLET ORAL at 21:12

## 2021-07-09 RX ADMIN — ACETAMINOPHEN 650 MG: 325 TABLET, FILM COATED ORAL at 01:39

## 2021-07-09 RX ADMIN — ASPIRIN 81 MG CHEWABLE TABLET 81 MG: 81 TABLET CHEWABLE at 07:56

## 2021-07-09 NOTE — PAYOR COMM NOTE
"This is initial clinical for Matheus Jolly, ref#[id number]    AUTHORIZATION PENDING:   Please call or fax determination to contact below.   Thank you.    Maria E Santamaria, RN, BSN  Utilization Review Nurse  [facility]  Direct & confidential phone # [phone]  Fax # [phone]    Verified inpt order 7/8     ER admit: STEMI  Emergent cath with PCI  Admitted to [facility] on tridil gtt.  Troponin 2.444     Meets IP criteria per MCG guidelines:  Myocardial Infarction  ORG: M-230 (ISC)  Admission is indicated for 1 or more of the following(1)(2)(3)(4)(5)(6)(7)(8)(9):  Acute myocardial infarction (MI)[A] (not in context of cardiac procedure within last 48 hours) as indicated by ALL of the following(11):  Elevated cardiac troponin level as indicated by 1 or more of the following:  New or presumed new elevation of cardiac troponin level (ie, elevation clearly not due to chronic condition, see footnotes)[B][C][D]     Myocardial injury due to acute ischemia as indicated by 1 or more of the following:  Symptoms consistent with myocardial ischemia (eg, chest pain, dyspnea)(14)  New or presumed new ECG changes consistent with ischemia (eg, ST-segment change, left bundle branch block)    Matheus Jolly (76 y.o. Male)     Date of Birth Social Security Number Address Home Phone MRN    1945  [address] [place] [address] [phone] [id number]    Congregation Marital Status                  Admission Date Admission Type Admitting Provider Attending Provider Department, Room/Bed    7/8/21 Emergency Shay Mirza MD Keith, Matthew Cody Lee, MD [facility] CARDIOVASCULAR CARE UNIT, 2207/1    Discharge Date Discharge Disposition Discharge Destination                       Attending Provider: Shay Mirza MD    Allergies: No Known Allergies    Isolation: None   Infection: None   Code Status: CPR    Ht: 172.7 cm (68\")   Wt: 84.4 kg (186 lb)    Admission Cmt: None "   Principal Problem: None                Active Insurance as of 7/8/2021     Primary Coverage     Payor Plan Insurance Group Employer/Plan Group    ANTHEM BLUE [id number]     Payor Plan Address Payor Plan Phone Number Payor Plan Fax Number Effective Dates    [address]   1/13/2001 - None Entered    [place] [address]       Subscriber Name Subscriber Birth Date Member ID       DARREN RODGERS 1945 [id number]                 Emergency Contacts          No emergency contacts on file.               History & Physical      Shay Mirza MD at 07/08/21 1954             cardiology H&P  Shay Mirza MD, PhD  [facility] cardiology    No care team member to display    CHIEF COMPLAINT: Chest pain, anterior STEMI    HISTORY OF PRESENT ILLNESS:    This very pleasant 76-year-old gentleman who presented to the ER with acute onset of chest pain finding anterolateral ST elevation concerning for myocardial infarction the setting of chest pain. Patient was taken emergently to the Cath Lab demonstrating 99% stenosis of the mid LAD status post intervention with Xience 3.5 x 28 drug-eluting stent postdilated to 4.2 and 4.6 mm distal proximal respectively. He did well. Ostium of the left main was also viewed to be stenotic however intravascular ultrasound revealed cross-sectional area most narrowed at 6.4 mm² and 6.8 mm^2 on 2 successive measurements not meeting criteria for intervention. Patient left the Cath Lab hemodynamically electrically stable. He is being transferred to ICU for continued care. Telemetry monitoring standard post MI guideline directed therapy    Historically patient acts younger than he really as per family, mows the yard in 90 degree heat, takes no medicines at home, has otherwise been healthy    Non-smoker not diabetic    Review of systems otherwise negative x14 point review of systems except as mentioned above    Historical data copied forward from previous encounters and EMR is  "unchanged      No past medical history on file.  No past surgical history on file.  No family history on file.  Social History     Tobacco Use   • Smoking status: Not on file   Substance Use Topics   • Alcohol use: Not on file   • Drug use: Not on file     No medications prior to admission.     Allergies:  Patient has no known allergies.    REVIEW OF SYSTEMS:  Please see the above history of present illness for pertinent positives and negatives.  The remainder of the patient's systems have been reviewed and are negative.     Vital Signs  Temp:  [97.7 °F (36.5 °C)] 97.7 °F (36.5 °C)  Heart Rate:  [70] 70  Resp:  [16] 16  BP: (152-182)/(79-92) 152/92    Flowsheet Rows      First Filed Value   Admission Height  172.7 cm (68\") Documented at 07/08/2021 1658   Admission Weight  83.9 kg (185 lb) Documented at 07/08/2021 1658           Physical Exam:  Physical Exam   Constitutional: Patient appears well-developed appears younger than stated age and well-nourished and mild distress on initial encounter  HEENT:   Head: Normocephalic and atraumatic.   Eyes:  Pupils are equal, round, and reactive to light. EOM are intact. Sclerae are anicteric and noninjected.  Mouth and Throat: Patient has moist mucous membranes. Oropharynx is clear of any erythema or exudate.     Neck: Neck supple. No JVD present. No thyromegaly present. No lymphadenopathy present.  Cardiovascular: Regular rate, regular rhythm, S1 normal and S2 normal.  Exam reveals no gallop and no friction rub.  No murmur heard auscultation.  Pulmonary/Chest: Lungs are clear to auscultation bilaterally. No respiratory distress. No wheezes. No rhonchi. No rales.   Abdominal: Soft. Bowel sounds are normal. No distension and no mass. There is no hepatosplenomegaly. There is no tenderness.   Musculoskeletal: Normal muscle tone  Extremities: No edema. Pulses are palpable in all 4 extremities.  Neurological: Patient is alert and oriented to person, place, and time. Cranial nerves " II-XII are grossly intact with no focal deficits.  Skin: Skin is warm. No rash noted. Nails show no clubbing.  No cyanosis or erythema.    Right inguinal hernia present nonincarcerated     Results Review:    I reviewed the patient's new clinical results.  Lab Results (most recent)     Procedure Component Value Units Date/Time    POC Activated Clotting Time [[id number]]  (Abnormal) Collected: 07/08/21 1905    Specimen: Arterial Blood Updated: 07/08/21 1917     Activated Clotting Time  268 Seconds      Comment: Serial Number: [id number]:  [id number]       POC Activated Clotting Time [[id number]]  (Abnormal) Collected: 07/08/21 1847    Specimen: Arterial Blood Updated: 07/08/21 1917     Activated Clotting Time  257 Seconds      Comment: Serial Number: [id number]:  [id number]       aPTT [[id number]]  (Abnormal) Collected: 07/08/21 1734    Specimen: Blood Updated: 07/08/21 1853     PTT 27.6 seconds     [place] Draw [[id number]] Collected: 07/08/21 1734    Specimen: Blood Updated: 07/08/21 1845    Narrative:      The following orders were created for panel order [place] Draw.  Procedure                               Abnormality         Status                     ---------                               -----------         ------                     Green Top (Gel)[[id number]]                                  Final result               Lavender Top[[id number]]                                     Final result               Gold Top - SST[[id number]]                                   Final result                 Please view results for these tests on the individual orders.    Lavender Top [[id number]] Collected: 07/08/21 1734    Specimen: Blood Updated: 07/08/21 1845     Extra Tube hold for add-on     Comment: Auto resulted       Gold Top - SST [[id number]] Collected: 07/08/21 1734    Specimen: Blood Updated: 07/08/21 1845     Extra Tube Hold for add-ons.     Comment: Auto resulted.       Green Top (Gel) [[id number]] Collected:  07/08/21 1734    Specimen: Blood Updated: 07/08/21 1845     Extra Tube Hold for add-ons.     Comment: Auto resulted.       Troponin [[id number]]  (Abnormal) Collected: 07/08/21 1734    Specimen: Blood Updated: 07/08/21 1839     Troponin T 1.800 ng/mL     Narrative:      Troponin T Reference Range:  <= 0.03 ng/mL-   Negative for AMI  >0.03 ng/mL-     Abnormal for myocardial necrosis.  Clinicians would have to utilize clinical acumen, EKG, Troponin and serial changes to determine if it is an Acute Myocardial Infarction or myocardial injury due to an underlying chronic condition.       Results may be falsely decreased if patient taking Biotin.      Comprehensive Metabolic Panel [[id number]]  (Abnormal) Collected: 07/08/21 1734    Specimen: Blood Updated: 07/08/21 1832     Glucose 131 mg/dL      BUN 8 mg/dL      Creatinine 0.77 mg/dL      Sodium 139 mmol/L      Potassium 3.9 mmol/L      Chloride 96 mmol/L      CO2 27.0 mmol/L      Calcium 9.3 mg/dL      Total Protein 7.9 g/dL      Albumin 5.00 g/dL      ALT (SGPT) 35 U/L      AST (SGOT) 96 U/L      Alkaline Phosphatase 71 U/L      Total Bilirubin 1.0 mg/dL      eGFR Non African Amer 98 mL/min/1.73      Globulin 2.9 gm/dL      A/G Ratio 1.7 g/dL      BUN/Creatinine Ratio 10.4     Anion Gap 16.0 mmol/L     Narrative:      GFR Normal >60  Chronic Kidney Disease <60  Kidney Failure <15      Magnesium [[id number]]  (Normal) Collected: 07/08/21 1734    Specimen: Blood Updated: 07/08/21 1832     Magnesium 1.9 mg/dL     COVID PRE-OP / PRE-PROCEDURE SCREENING ORDER (NO ISOLATION) - Swab, Nasopharynx [[id number]]  (Normal) Collected: 07/08/21 1733    Specimen: Swab from Nasopharynx Updated: 07/08/21 1508    Narrative:      The following orders were created for panel order COVID PRE-OP / PRE-PROCEDURE SCREENING ORDER (NO ISOLATION) - Swab, Nasopharynx.  Procedure                               Abnormality         Status                     ---------                                -----------         ------                     COVID-19,CEPHEID,COR/DEVIN...[[id number]]  Normal              Final result                 Please view results for these tests on the individual orders.    COVID-19,CEPHEID,COR/DEVIN/PAD/JIMMY IN-HOUSE(OR EMERGENT/ADD-ON),NP SWAB IN TRANSPORT MEDIA 3-4 HR TAT, RT-PCR - Swab, Nasopharynx [[id number]]  (Normal) Collected: 07/08/21 1733    Specimen: Swab from Nasopharynx Updated: 07/08/21 1758     COVID19 Not Detected    Narrative:      Fact sheet for providers: https://www.fda.gov/media/811201/download     Fact sheet for patients: https://www.fda.gov/media/630232/download  Fact sheet for providers: https://www.fda.gov/media/994996/download    Fact sheet for patients: https://www.fda.gov/media/359816/download    Test performed by PCR.    Protime-INR [[id number]]  (Normal) Collected: 07/08/21 1734    Specimen: Blood Updated: 07/08/21 1758     Protime 11.7 Seconds      INR 1.06    CBC & Differential [[id number]]  (Abnormal) Collected: 07/08/21 1734    Specimen: Blood Updated: 07/08/21 1748    Narrative:      The following orders were created for panel order CBC & Differential.  Procedure                               Abnormality         Status                     ---------                               -----------         ------                     CBC Auto Differential[[id number]]        Abnormal            Final result                 Please view results for these tests on the individual orders.    CBC Auto Differential [[id number]]  (Abnormal) Collected: 07/08/21 1734    Specimen: Blood Updated: 07/08/21 1748     WBC 12.00 10*3/mm3      RBC 5.24 10*6/mm3      Hemoglobin 15.8 g/dL      Hematocrit 46.3 %      MCV 88.3 fL      MCH 30.2 pg      MCHC 34.1 g/dL      RDW 13.4 %      RDW-SD 42.0 fl      MPV 8.5 fL      Platelets 149 10*3/mm3      Neutrophil % 85.2 %      Lymphocyte % 7.0 %      Monocyte % 7.4 %      Eosinophil % 0.1 %      Basophil % 0.3 %      Neutrophils, Absolute 10.20  10*3/mm3      Lymphocytes, Absolute 0.80 10*3/mm3      Monocytes, Absolute 0.90 10*3/mm3      Eosinophils, Absolute 0.00 10*3/mm3      Basophils, Absolute 0.00 10*3/mm3      nRBC 0.0 /100 WBC           Imaging Results (Most Recent)     None        reviewed    ECG/EMG Results (most recent)     Procedure Component Value Units Date/Time    ECG 12 Lead [[id number]] Collected: 07/08/21 1717     Updated: 07/08/21 1718     QT Interval 377 ms     Narrative:      HEART RATE= 64  bpm  RR Interval= 944  ms  WV Interval= 228  ms  P Horizontal Axis= 20  deg  P Front Axis= 18  deg  QRSD Interval= 93  ms  QT Interval= 377  ms  QRS Axis= 38  deg  T Wave Axis= -3  deg  - ABNORMAL ECG -  Sinus rhythm  Prolonged WV interval  ST elevation, consider anterolateral injury  No previous ECG available for comparison  Electronically Signed By:   Date and Time of Study: 2021-07-08 17:17:01        reviewed    Assessment/Plan     Anterior STEMI  Status post Xience 3.5 x 28 drug-eluting stent postdilated to 4.2 and 4.6 mm respectively distal to proximal  Dual antiplatelet therapy with aspirin and ticagrelor will be continued  Statin therapy with atorvastatin 20 daily  Get fasting lipid panel  Add Coreg 3.125 twice daily after anterior infarct and adequate blood pressure and no signs of shock, Killip class I  Titrate medicines per guidelines as allowed by hemodynamics  Add ACE inhibitor or Aldactone for anterior MI when able    Blood pressure goal is less than 135 systolic  Nitroglycerin drip if needed  Hydralazine given acutely in the ER    Goal LDL less than 70    Secondary prevention goals moving forward    Observe in ICU, continue telemetry, trend cardiac enzymes, get 2D echo for structural functional evaluation after MI    Further recommendations based on clinical course    Shay Mirza MD, PhD    I discussed the patient's findings and my recommendations with patient and staff    Shay Mirza MD  07/08/21  19:55 EDT    New patient  "to me with new problems above      Electronically signed by Shay Mirza MD at 21 195       Emergency Department Notes    No notes of this type exist for this encounter.         Operative/Procedure Notes (last 48 hours) (Notes from 21 1526 through 21 1526)    No notes of this type exist for this encounter.          Physician Progress Notes (last 48 hours) (Notes from 21 1526 through 21 1526)      Shay Mirza MD at 21 1306          [facility]        LOS:  LOS: 1 day   Patient Name: Matheus Jolly  Age/Sex: 76 y.o. male  : 1945  MRN: [id number]    Day of Service: 21   Length of Stay: 1  Encounter Provider: JIMBO Jasmine  Place of Service: [facility] CARDIOLOGY  No care team member to display    Subjective:     Chief Complaint: f/u anterior STEMI    Subjective:   Seen cont with CP, EKG changed from yesterday from this am, concerning for ongoing ischemia, d/w pt and family extensively,     Back to cath lab to ensure stent ptentcy and latonya 3 flow in all vessels given EKG abn with ST changes compared to prior.     Pt per family not \"a complainer\" and just this am report some CP coniued but better than yesterday.     Confirmed pt received brillenta loading and ASA in addition to dose this am    Current Medications:   Scheduled Meds:aspirin, 243 mg, Oral, Once  aspirin, 81 mg, Oral, Daily  atorvastatin, 20 mg, Oral, Nightly  fentanyl, 50 mcg, Intravenous, Once  heparin, 4,000 Units, Intravenous, Once  [START ON 7/10/2021] metoprolol tartrate, 12.5 mg, Oral, Q12H  ondansetron, 4 mg, Intravenous, Once  spironolactone, 12.5 mg, Oral, Daily  ticagrelor, 180 mg, Oral, Once  ticagrelor, 90 mg, Oral, BID      Continuous Infusions:nitroglycerin, 10-50 mcg/min, Last Rate: 10 mcg/min (21 5105)        Allergies:  No Known Allergies    ROS    Objective:     Temp:  [97.7 °F (36.5 °C)-100 °F (37.8 °C)] 97.9 " °F (36.6 °C)  Heart Rate:  [60-90] 73  Resp:  [16] 16  BP: ()/(53-92) 97/53     Intake/Output Summary (Last 24 hours) at 7/9/2021 1306  Last data filed at 7/9/2021 1103  Gross per 24 hour   Intake 951 ml   Output 1450 ml   Net -499 ml     Body mass index is 28.43 kg/m².      07/08/21  1658 07/08/21 2000 07/09/21  0607   Weight: 83.9 kg (185 lb) 84.9 kg (187 lb 2.7 oz) 84.8 kg (186 lb 15.2 oz)         Physical Exam:  General Appearance:    Alert, cooperative, in no acute distress, up to chair               Neck:   supple, trachea midline, no thyromegaly, no carotid bruit, no JVD   Lungs:     Clear to auscultation,respirations regular, even and                  Unlabored, no crackles    Heart:    Regular rhythm and normal rate, 70s. normal S1 and S2, 1/6 HARPREET, no gallop   Abdomen:     Normal bowel sounds, no masses, no organomegaly, soft        non-tender, non-distended, no guarding, no rebound                tenderness   Extremities:   Moves all extremities well, no edema, no cyanosis, no             redness   Pulses:   Pulses palpable and equal bilaterally   Skin:   No bleeding, bruising or rash   Neurologic:   Awake, alert, oriented x3         Lab Review:   Results from last 7 days   Lab Units 07/09/21  0621 07/08/21  1734   SODIUM mmol/L 136 139   POTASSIUM mmol/L 4.0 3.9   CHLORIDE mmol/L 97* 96*   CO2 mmol/L 28.0 27.0   BUN mg/dL 10 8   CREATININE mg/dL 0.85 0.77   GLUCOSE mg/dL 128* 131*   CALCIUM mg/dL 9.0 9.3   AST (SGOT) U/L 138* 96*   ALT (SGPT) U/L 39 35     Results from last 7 days   Lab Units 07/09/21  0621 07/08/21  1734   TROPONIN T ng/mL 2.440* 1.800*     Results from last 7 days   Lab Units 07/09/21  0621 07/08/21  1734   WBC 10*3/mm3 12.00* 12.00*   HEMOGLOBIN g/dL 14.5 15.8   HEMATOCRIT % 42.7 46.3   PLATELETS 10*3/mm3 146 149     Results from last 7 days   Lab Units 07/09/21  0621 07/08/21  1734   INR   --  1.06   APTT seconds 30.5* 27.6*     Results from last 7 days   Lab Units  07/09/21  0621 07/08/21  1734   MAGNESIUM mg/dL 1.9 1.9     Results from last 7 days   Lab Units 07/09/21  0621   CHOLESTEROL mg/dL 129   TRIGLYCERIDES mg/dL 60   HDL CHOL mg/dL 37*               Recent Radiology:  Imaging Results (Most Recent)     None          ECHOCARDIOGRAM:          I reviewed the patient's new clinical results.    EKG:              Assessment:       ST elevation myocardial infarction (STEMI) (CMS/[facility])    1. Anterior STEMI  - 7/8/2021: 99% stenosis of the mid LAD status post intervention with Xience 3.5 x 28 drug-eluting stent postdilated to 4.2 and 4.6 mm distal proximal respectively  - troponin 1.8, 2.4, trend troponin  - ASA / Brilinta / statin  - 2D ECHO pending  - on metoprolol, aldactone added    2.  HTN-- borderline bp currently    3. Dyslipidemia  - FLP total 129, HDL 37, LDL 79, triglycerides    Plan:   ECHO pending  EKG abn with inferior ST elevation different than yesterday with LAD culprit yesterdayw ith successful intervention and post dilation to > 4mm with IVUS verified apposition and LM > 6mm^2  Troponin 1.8, 2.4, continue to trend  Continue DAPT with ASA / Brilinta  Continue on Statin therapy  On aldactone    Back to cath now for angiography    Further recs to follow.       Shay Mirza MD PhD        Tari Mitchell, JIMBO  07/09/21  13:06 EDT    Electronically signed by Shay Mirza MD at 07/09/21 1320       Consult Notes (last 48 hours) (Notes from 07/07/21 1526 through 07/09/21 1526)    No notes of this type exist for this encounter.

## 2021-07-09 NOTE — SIGNIFICANT NOTE
07/09/21 1122   Transfer of Nursing Care   Nurse Report Given To Obtained from CASANDRA Mondragon   Transfer of care

## 2021-07-09 NOTE — CASE MANAGEMENT/SOCIAL WORK
Continued Stay Note  [facility]     Patient Name: Matheus Jolly  MRN: [id number]  Today's Date: 7/9/2021    Admit Date: 7/8/2021    Discharge Plan     Row Name 07/09/21 1631       Plan    Plan  DC Plan: Anticipate routine home.    Plan Comments  VIVI noted no insurance listed for this patient morning of 7/9. VIVI e-mailed [facility] [employer] to inquire if pt may have Medicare as pt is 76 years old. Per [facility] [employer], pt does not have Medicare - however, pt does have [employer].     Phone communication or documentation only - no physical contact with patient or family.    Hedy Olivo [facility]    Office: [phone]  Cell: [phone]  Fax: [phone]  E-mail: [email or web address]

## 2021-07-09 NOTE — NURSING NOTE
Patient started complaining of chest pain post heart cath. Placed call to Dr. Mirza's answering service, call returned by Tari Mitchell NP. Notified NP of chest pain and that RN suspected that ST elevation in some leads was worse than pre-cath. EKG completed and sent to NP (pre and post cath EKGs). Orders placed for morphine for chest pain (see mar).

## 2021-07-09 NOTE — NURSING NOTE
Dr. Mirza at bedside and requested RN to obtain STAT EKG (Dr. Mirza reviewed at bedside) and requested STAT echo to be performed due to continued ST elevation. Patient not complaining of any chest pain at this time, just soreness. Plans to take patient back to cath lab this afternoon. Will continue to monitor.

## 2021-07-09 NOTE — PLAN OF CARE
Goal Outcome Evaluation:  Plan of Care Reviewed With: patient, family        Progress: no change  Outcome Summary: Patient is currently not on any drips and remains on room air. No complaints of chest pain at this time, just some soreness. EKG reviewed by Dr. Mirza and plans to take patient to cath lab at this time. Will continue to monitor progress.

## 2021-07-09 NOTE — CASE MANAGEMENT/SOCIAL WORK
Discharge Planning Assessment   [facility]     Patient Name: Matheus Jolly  MRN: [id number]  Today's Date: 7/9/2021    Admit Date: 7/8/2021    Discharge Needs Assessment     Row Name 07/09/21 1446       Living Environment    Lives With  alone    Current Living Arrangements  home/apartment/condo    Primary Care Provided by  self    Provides Primary Care For  no one    Family Caregiver if Needed  none    Able to Return to Prior Arrangements  yes       Resource/Environmental Concerns    Resource/Environmental Concerns  none    Transportation Concerns  car, none       Transition Planning    Patient/Family Anticipates Transition to  home    Patient/Family Anticipated Services at Transition  none       Discharge Needs Assessment    Readmission Within the Last 30 Days  no previous admission in last 30 days    Equipment Currently Used at Home  none    Concerns to be Addressed  no discharge needs identified    Anticipated Changes Related to Illness  none    Equipment Needed After Discharge  none    Current Discharge Risk  lives alone        Discharge Plan     Row Name 07/09/21 1448       Plan    Plan  DC Plan: Anticipate routine home.    Patient/Family in Agreement with Plan  yes    Plan Comments  Met with patient in room, he is very hard of hearing.  Patient said he lives at home alone, is I with ADLs and drives.  PCP - Dr. Ro.  Denies use of DME.  Patient had heart cath today.  Patient started on Brilinta - $5 coupon card left on bedside table, and nurse made aware through secure chat.        Continued Care and Services - Admitted Since 7/8/2021           Expected Discharge Date and Time     Expected Discharge Date Expected Discharge Time    Jul 10, 2021         Demographic Summary     Row Name 07/09/21 1442       General Information    Admission Type  inpatient    Arrived From  emergency department    Referral Source  admission list    Reason for Consult  discharge planning    Preferred Language  English      Used During This Interaction  no        Functional Status     Row Name 07/09/21 1446       Functional Status    Usual Activity Tolerance  excellent    Current Activity Tolerance  good       Functional Status, IADL    Medications  independent    Meal Preparation  independent    Housekeeping  independent    Laundry  independent    Shopping  independent       Mental Status    General Appearance WDL  WDL       Mental Status Summary    Recent Changes in Mental Status/Cognitive Functioning  no changes                Bernarda Cunningham RN

## 2021-07-10 LAB
ANION GAP SERPL CALCULATED.3IONS-SCNC: 9 MMOL/L (ref 5–15)
APTT PPP: 31.6 SECONDS (ref 61–76.5)
BUN SERPL-MCNC: 14 MG/DL (ref 8–23)
BUN/CREAT SERPL: 20.9 (ref 7–25)
CALCIUM SPEC-SCNC: 8.8 MG/DL (ref 8.6–10.5)
CHLORIDE SERPL-SCNC: 98 MMOL/L (ref 98–107)
CO2 SERPL-SCNC: 26 MMOL/L (ref 22–29)
CREAT SERPL-MCNC: 0.67 MG/DL (ref 0.76–1.27)
DEPRECATED RDW RBC AUTO: 42 FL (ref 37–54)
ERYTHROCYTE [DISTWIDTH] IN BLOOD BY AUTOMATED COUNT: 13.7 % (ref 12.3–15.4)
GFR SERPL CREATININE-BSD FRML MDRD: 115 ML/MIN/1.73
GLUCOSE SERPL-MCNC: 132 MG/DL (ref 65–99)
HCT VFR BLD AUTO: 38.9 % (ref 37.5–51)
HGB BLD-MCNC: 13.4 G/DL (ref 13–17.7)
MAGNESIUM SERPL-MCNC: 1.9 MG/DL (ref 1.6–2.4)
MCH RBC QN AUTO: 30.6 PG (ref 26.6–33)
MCHC RBC AUTO-ENTMCNC: 34.5 G/DL (ref 31.5–35.7)
MCV RBC AUTO: 88.8 FL (ref 79–97)
PLATELET # BLD AUTO: 144 10*3/MM3 (ref 140–450)
PMV BLD AUTO: 9.6 FL (ref 6–12)
POTASSIUM SERPL-SCNC: 4.1 MMOL/L (ref 3.5–5.2)
QT INTERVAL: 407 MS
RBC # BLD AUTO: 4.38 10*6/MM3 (ref 4.14–5.8)
SODIUM SERPL-SCNC: 133 MMOL/L (ref 136–145)
TROPONIN T SERPL-MCNC: 2.38 NG/ML (ref 0–0.03)
WBC # BLD AUTO: 12.8 10*3/MM3 (ref 3.4–10.8)

## 2021-07-10 PROCEDURE — 85027 COMPLETE CBC AUTOMATED: CPT | Performed by: INTERNAL MEDICINE

## 2021-07-10 PROCEDURE — 25010000002 EPTIFIBATIDE PER 5 MG: Performed by: INTERNAL MEDICINE

## 2021-07-10 PROCEDURE — 99233 SBSQ HOSP IP/OBS HIGH 50: CPT | Performed by: INTERNAL MEDICINE

## 2021-07-10 PROCEDURE — 36415 COLL VENOUS BLD VENIPUNCTURE: CPT | Performed by: INTERNAL MEDICINE

## 2021-07-10 PROCEDURE — 84484 ASSAY OF TROPONIN QUANT: CPT | Performed by: INTERNAL MEDICINE

## 2021-07-10 PROCEDURE — 94799 UNLISTED PULMONARY SVC/PX: CPT

## 2021-07-10 PROCEDURE — 83735 ASSAY OF MAGNESIUM: CPT | Performed by: INTERNAL MEDICINE

## 2021-07-10 PROCEDURE — 85730 THROMBOPLASTIN TIME PARTIAL: CPT | Performed by: INTERNAL MEDICINE

## 2021-07-10 PROCEDURE — 93010 ELECTROCARDIOGRAM REPORT: CPT | Performed by: INTERNAL MEDICINE

## 2021-07-10 PROCEDURE — 80048 BASIC METABOLIC PNL TOTAL CA: CPT | Performed by: INTERNAL MEDICINE

## 2021-07-10 PROCEDURE — 93005 ELECTROCARDIOGRAM TRACING: CPT | Performed by: INTERNAL MEDICINE

## 2021-07-10 RX ORDER — EPTIFIBATIDE 0.75 MG/ML
2 INJECTION, SOLUTION INTRAVENOUS CONTINUOUS
Status: DISPENSED | OUTPATIENT
Start: 2021-07-10 | End: 2021-07-11

## 2021-07-10 RX ADMIN — ATORVASTATIN CALCIUM 20 MG: 20 TABLET, FILM COATED ORAL at 20:35

## 2021-07-10 RX ADMIN — ACETAMINOPHEN 650 MG: 325 TABLET, FILM COATED ORAL at 08:11

## 2021-07-10 RX ADMIN — ASPIRIN 81 MG CHEWABLE TABLET 81 MG: 81 TABLET CHEWABLE at 08:11

## 2021-07-10 RX ADMIN — TICAGRELOR 90 MG: 90 TABLET ORAL at 20:35

## 2021-07-10 RX ADMIN — METOPROLOL TARTRATE 12.5 MG: 25 TABLET, FILM COATED ORAL at 20:35

## 2021-07-10 RX ADMIN — TICAGRELOR 90 MG: 90 TABLET ORAL at 08:11

## 2021-07-10 RX ADMIN — EPTIFIBATIDE 2 MCG/KG/MIN: 0.75 INJECTION INTRAVENOUS at 09:15

## 2021-07-10 RX ADMIN — SPIRONOLACTONE 12.5 MG: 25 TABLET ORAL at 08:11

## 2021-07-10 RX ADMIN — ACETAMINOPHEN 650 MG: 325 TABLET, FILM COATED ORAL at 14:00

## 2021-07-10 RX ADMIN — EPTIFIBATIDE 2 MCG/KG/MIN: 0.75 INJECTION INTRAVENOUS at 01:52

## 2021-07-10 RX ADMIN — METOPROLOL TARTRATE 12.5 MG: 25 TABLET, FILM COATED ORAL at 08:11

## 2021-07-10 RX ADMIN — ACETAMINOPHEN 650 MG: 325 TABLET, FILM COATED ORAL at 20:42

## 2021-07-10 NOTE — PROGRESS NOTES
"        LOS:  LOS: 2 days   Patient Name: Matheus Jolly  Age/Sex: 76 y.o. male  : 1945  MRN: [id number]    Day of Service: 07/10/21   Length of Stay: 2  Encounter Provider: Anirudh Stahl MD  No care team member to display    Subjective:     Chief Complaint: f/u anterior STEMI    Subjective:   Seen cont with CP, EKG changed from yesterday from this am, concerning for ongoing ischemia, d/w pt and family extensively,     Back to cath lab to ensure stent ptentcy and latonya 3 flow in all vessels given EKG abn with ST changes compared to prior.     Pt per family not \"a complainer\" and just this am report some CP coniued but better than yesterday.     Confirmed pt received brillenta loading and ASA in addition to dose this am    Current Medications:   Scheduled Meds:aspirin, 243 mg, Oral, Once  aspirin, 81 mg, Oral, Daily  atorvastatin, 20 mg, Oral, Nightly  fentanyl, 50 mcg, Intravenous, Once  heparin, 4,000 Units, Intravenous, Once  metoprolol tartrate, 12.5 mg, Oral, Q12H  ondansetron, 4 mg, Intravenous, Once  spironolactone, 12.5 mg, Oral, Daily  ticagrelor, 180 mg, Oral, Once  ticagrelor, 90 mg, Oral, BID      Continuous Infusions:     Allergies:  No Known Allergies    Review of Systems   Constitutional: Negative for fever and malaise/fatigue.   HENT: Negative for ear pain and nosebleeds.    Eyes: Negative for blurred vision and double vision.   Cardiovascular: Negative for chest pain, dyspnea on exertion and palpitations.   Respiratory: Negative for cough and shortness of breath.    Skin: Negative for rash.   Musculoskeletal: Negative for joint pain.   Gastrointestinal: Negative for abdominal pain, nausea and vomiting.   Neurological: Negative for focal weakness and headaches.   Psychiatric/Behavioral: Negative for depression. The patient is not nervous/anxious.    All other systems reviewed and are negative.      Objective:     Temp:  [98.1 °F (36.7 °C)-99.2 °F (37.3 °C)] 98.4 °F (36.9 °C)  Heart Rate:  " [64-86] 68  BP: (100-138)/(55-82) 117/67     Intake/Output Summary (Last 24 hours) at 7/10/2021 1523  Last data filed at 7/10/2021 1505  Gross per 24 hour   Intake 1987 ml   Output 1950 ml   Net 37 ml     Body mass index is 27.82 kg/m².      07/09/21  0607 07/09/21  1307 07/10/21  0600   Weight: 84.8 kg (186 lb 15.2 oz) 84.4 kg (186 lb) 83 kg (182 lb 15.7 oz)         Physical Exam:  General Appearance:    Alert, cooperative, in no acute distress, up to chair               Neck:   supple, trachea midline, no thyromegaly, no carotid bruit, no JVD   Lungs:     Clear to auscultation,respirations regular, even and                  Unlabored, no crackles    Heart:    Regular rhythm and normal rate, 70s. normal S1 and S2, 1/6 HARPREET, no gallop   Abdomen:     Normal bowel sounds, no masses, no organomegaly, soft        non-tender, non-distended, no guarding, no rebound                tenderness   Extremities:   Moves all extremities well, no edema, no cyanosis, no             redness   Pulses:   Pulses palpable and equal bilaterally   Skin:   No bleeding, bruising or rash   Neurologic:   Awake, alert, oriented x3         Lab Review:   Results from last 7 days   Lab Units 07/10/21  0524 07/09/21  1345 07/09/21  0621   SODIUM mmol/L 133* 135* 136   POTASSIUM mmol/L 4.1 4.1 4.0   CHLORIDE mmol/L 98 99 97*   CO2 mmol/L 26.0 26.0 28.0   BUN mg/dL 14 15 10   CREATININE mg/dL 0.67* 0.79 0.85   GLUCOSE mg/dL 132* 129* 128*   CALCIUM mg/dL 8.8 9.0 9.0   AST (SGOT) U/L  --  109* 138*   ALT (SGPT) U/L  --  35 39     Results from last 7 days   Lab Units 07/10/21  0524 07/09/21  1345 07/09/21  0621 07/08/21  1734   CK TOTAL U/L  --  698*  --   --    TROPONIN T ng/mL 2.380* 2.720* 2.440* 1.800*     Results from last 7 days   Lab Units 07/10/21  0524 07/09/21  0621   WBC 10*3/mm3 12.80* 12.00*   HEMOGLOBIN g/dL 13.4 14.5   HEMATOCRIT % 38.9 42.7   PLATELETS 10*3/mm3 144 146     Results from last 7 days   Lab Units 07/10/21  0524  07/09/21  0621 07/08/21  1734   INR   --   --  1.06   APTT seconds 31.6* 30.5* 27.6*     Results from last 7 days   Lab Units 07/10/21  0524 07/09/21  0621   MAGNESIUM mg/dL 1.9 1.9     Results from last 7 days   Lab Units 07/09/21  0621   CHOLESTEROL mg/dL 129   TRIGLYCERIDES mg/dL 60   HDL CHOL mg/dL 37*               Recent Radiology:  Imaging Results (Most Recent)     None          ECHOCARDIOGRAM:    Results for orders placed during the hospital encounter of 07/08/21    Adult Transthoracic Echo Complete w/ Color, Spectral and Contrast if Necessary Per Protocol    Interpretation Summary  · Mild dilation of the aortic root is present.  · Estimated left ventricular EF = 45% Estimated left ventricular EF was in disagreement with the calculated left ventricular EF. Left ventricular ejection fraction appears to be 41 - 45%. Left ventricular systolic function is low normal.  · Left ventricular diastolic function is consistent with (grade Ia w/high LAP) impaired relaxation.  · Estimated right ventricular systolic pressure from tricuspid regurgitation is normal (<35 mmHg).    Overall EF 45%  Regional abnormalities as identified consistent with LAD injury, apex, anteroapical, inferoapical, mid apical septum all severely hypokinetic, other segments appear to contract normally  RV size and function normal  Atrial sizes grossly normal  Grade 1 diastolic dysfunction by mitral inflow pattern  No masses or effusion seen  Right atrial pressure estimated 5-8  Trace to mild MR, trace to mild TR no valvular stenoses seen  Structurally normal aortic valve in limited views, no regurgitation or stenosis by Doppler  Pulmonic valve not well seen no stenosis by Doppler  No Doppler evidence of ASD or PFO        I reviewed the patient's new clinical results.    EKG:              Assessment:       ST elevation myocardial infarction (STEMI) (CMS/HCC)    1. Anterior STEMI  - 7/8/2021: 99% stenosis of the mid LAD status post intervention with  Xience 3.5 x 28 drug-eluting stent postdilated to 4.2 and 4.6 mm distal proximal respectively  - troponin 1.8, 2.4, trend troponin  - ASA / Brilinta / statin  - 2D ECHO pending  - on metoprolol, aldactone added    2.  HTN-- borderline bp currently    3. Dyslipidemia  - FLP total 129, HDL 37, LDL 79, triglycerides    Plan:   ECHO pending  EKG abn with inferior ST elevation different than yesterday with LAD culprit yesterdayw ith successful intervention and post dilation to > 4mm with IVUS verified apposition and LM > 6mm^2  Troponin 1.8, 2.4, continue to trend  Continue DAPT with ASA / Brilinta  Continue on Statin therapy  On aldactone    Patient was taken to cardiac catheterization again because of EKG changes and left arm pain and was noted to have patent stent but poor distal flow and hence was placed on IV Integrilin  Will continue Integrilin for 2 days  2D echo with apical akinesis, overall EF 45% consistent with LAD injury  Left heart cath with apical LAD no reflow, possible distal thromboembolism from thrombus trapped behind the mid LAD stent versus secondary reperfusion injury and microvasculature congestion, very small caliber wraparound LAD unable to open this given size, not amenable to PCI  Treat medically, Integrilin drip 18 hours on background of aspirin and ticagrelor  Stop tomorrow  Continue beta-blocker, Aldactone on board, add low-dose ACE inhibitor if able per hemodynamics  Likely be in the hospital 3-4 more days  Continue telemetry, closely monitor for any electrical instability        Electronically signed by Anirudh Stahl MD, 07/10/21, 3:25 PM EDT.

## 2021-07-10 NOTE — PLAN OF CARE
Goal Outcome Evaluation:  Plan of Care Reviewed With: patient, spouse        Progress: no change

## 2021-07-11 LAB
APTT PPP: 29.6 SECONDS (ref 61–76.5)
BASOPHILS # BLD AUTO: 0 10*3/MM3 (ref 0–0.2)
BASOPHILS NFR BLD AUTO: 0.3 % (ref 0–1.5)
DEPRECATED RDW RBC AUTO: 42 FL (ref 37–54)
EOSINOPHIL # BLD AUTO: 0 10*3/MM3 (ref 0–0.4)
EOSINOPHIL NFR BLD AUTO: 0.2 % (ref 0.3–6.2)
ERYTHROCYTE [DISTWIDTH] IN BLOOD BY AUTOMATED COUNT: 13.5 % (ref 12.3–15.4)
GLUCOSE BLDC GLUCOMTR-MCNC: 149 MG/DL (ref 70–105)
HCT VFR BLD AUTO: 40.8 % (ref 37.5–51)
HGB BLD-MCNC: 13.9 G/DL (ref 13–17.7)
LYMPHOCYTES # BLD AUTO: 0.9 10*3/MM3 (ref 0.7–3.1)
LYMPHOCYTES NFR BLD AUTO: 8 % (ref 19.6–45.3)
MAGNESIUM SERPL-MCNC: 2 MG/DL (ref 1.6–2.4)
MCH RBC QN AUTO: 30.4 PG (ref 26.6–33)
MCHC RBC AUTO-ENTMCNC: 34.1 G/DL (ref 31.5–35.7)
MCV RBC AUTO: 89.2 FL (ref 79–97)
MONOCYTES # BLD AUTO: 1.6 10*3/MM3 (ref 0.1–0.9)
MONOCYTES NFR BLD AUTO: 14.6 % (ref 5–12)
NEUTROPHILS NFR BLD AUTO: 76.9 % (ref 42.7–76)
NEUTROPHILS NFR BLD AUTO: 8.5 10*3/MM3 (ref 1.7–7)
NRBC BLD AUTO-RTO: 0 /100 WBC (ref 0–0.2)
PLATELET # BLD AUTO: 152 10*3/MM3 (ref 140–450)
PMV BLD AUTO: 9.1 FL (ref 6–12)
QT INTERVAL: 374 MS
QT INTERVAL: 397 MS
RBC # BLD AUTO: 4.57 10*6/MM3 (ref 4.14–5.8)
WBC # BLD AUTO: 11 10*3/MM3 (ref 3.4–10.8)

## 2021-07-11 PROCEDURE — 83735 ASSAY OF MAGNESIUM: CPT | Performed by: INTERNAL MEDICINE

## 2021-07-11 PROCEDURE — 25010000002 AMIODARONE PER 30 MG

## 2021-07-11 PROCEDURE — 99233 SBSQ HOSP IP/OBS HIGH 50: CPT | Performed by: INTERNAL MEDICINE

## 2021-07-11 PROCEDURE — 85025 COMPLETE CBC W/AUTO DIFF WBC: CPT | Performed by: INTERNAL MEDICINE

## 2021-07-11 PROCEDURE — 25010000002 AMIODARONE PER 30 MG: Performed by: INTERNAL MEDICINE

## 2021-07-11 PROCEDURE — 85730 THROMBOPLASTIN TIME PARTIAL: CPT | Performed by: INTERNAL MEDICINE

## 2021-07-11 PROCEDURE — 25010000002 DIGOXIN PER 500 MCG

## 2021-07-11 PROCEDURE — 82962 GLUCOSE BLOOD TEST: CPT

## 2021-07-11 PROCEDURE — 93005 ELECTROCARDIOGRAM TRACING: CPT | Performed by: INTERNAL MEDICINE

## 2021-07-11 PROCEDURE — 25010000002 EPTIFIBATIDE PER 5 MG: Performed by: INTERNAL MEDICINE

## 2021-07-11 RX ORDER — AMIODARONE HYDROCHLORIDE 50 MG/ML
INJECTION, SOLUTION INTRAVENOUS
Status: COMPLETED
Start: 2021-07-11 | End: 2021-07-11

## 2021-07-11 RX ORDER — AMIODARONE HCL/D5W 450 MG/250
PLASTIC BAG, INJECTION (ML) INTRAVENOUS
Status: COMPLETED
Start: 2021-07-11 | End: 2021-07-11

## 2021-07-11 RX ORDER — DIGOXIN 0.25 MG/ML
INJECTION INTRAMUSCULAR; INTRAVENOUS
Status: COMPLETED
Start: 2021-07-11 | End: 2021-07-11

## 2021-07-11 RX ORDER — AMIODARONE HCL/D5W 450 MG/250
1 PLASTIC BAG, INJECTION (ML) INTRAVENOUS CONTINUOUS
Status: DISCONTINUED | OUTPATIENT
Start: 2021-07-11 | End: 2021-07-12

## 2021-07-11 RX ORDER — DIGOXIN 0.25 MG/ML
250 INJECTION INTRAMUSCULAR; INTRAVENOUS ONCE
Status: COMPLETED | OUTPATIENT
Start: 2021-07-11 | End: 2021-07-11

## 2021-07-11 RX ADMIN — EPTIFIBATIDE 2 MCG/KG/MIN: 0.75 INJECTION INTRAVENOUS at 02:36

## 2021-07-11 RX ADMIN — AMIODARONE HYDROCHLORIDE 450 MG: 50 INJECTION, SOLUTION INTRAVENOUS at 11:38

## 2021-07-11 RX ADMIN — METOPROLOL TARTRATE 12.5 MG: 25 TABLET, FILM COATED ORAL at 21:02

## 2021-07-11 RX ADMIN — AMIODARONE HYDROCHLORIDE 1 MG/MIN: 50 INJECTION, SOLUTION INTRAVENOUS at 16:24

## 2021-07-11 RX ADMIN — ACETAMINOPHEN 650 MG: 325 TABLET, FILM COATED ORAL at 09:43

## 2021-07-11 RX ADMIN — ASPIRIN 81 MG CHEWABLE TABLET 81 MG: 81 TABLET CHEWABLE at 11:03

## 2021-07-11 RX ADMIN — TICAGRELOR 90 MG: 90 TABLET ORAL at 11:03

## 2021-07-11 RX ADMIN — ACETAMINOPHEN 650 MG: 325 TABLET, FILM COATED ORAL at 21:06

## 2021-07-11 RX ADMIN — AMIODARONE HYDROCHLORIDE 150 MG: 50 INJECTION, SOLUTION INTRAVENOUS at 11:36

## 2021-07-11 RX ADMIN — METOPROLOL TARTRATE 12.5 MG: 25 TABLET, FILM COATED ORAL at 09:43

## 2021-07-11 RX ADMIN — SPIRONOLACTONE 12.5 MG: 25 TABLET ORAL at 09:43

## 2021-07-11 RX ADMIN — DIGOXIN 250 MCG: 0.25 INJECTION INTRAMUSCULAR; INTRAVENOUS at 12:39

## 2021-07-11 RX ADMIN — ATORVASTATIN CALCIUM 20 MG: 20 TABLET, FILM COATED ORAL at 21:01

## 2021-07-11 RX ADMIN — TICAGRELOR 90 MG: 90 TABLET ORAL at 21:01

## 2021-07-11 NOTE — PLAN OF CARE
Problem: Bleeding (Cardiac Catheterization)  Goal: Absence of Bleeding  Outcome: Ongoing, Progressing     Problem: Pain (Cardiac Catheterization)  Goal: Acceptable Pain Control  Outcome: Ongoing, Progressing  Intervention: Prevent or Manage Pain  Recent Flowsheet Documentation  Taken 7/10/2021 2042 by Carla Gilmore RN  Pain Management Interventions:   pillow support provided   position adjusted   see MAR  Diversional Activities:   smartphone   television     Problem: Fall Injury Risk  Goal: Absence of Fall and Fall-Related Injury  Outcome: Ongoing, Progressing  Intervention: Identify and Manage Contributors to Fall Injury Risk  Recent Flowsheet Documentation  Taken 7/11/2021 0500 by Carla Gilmore RN  Medication Review/Management: medications reviewed  Taken 7/11/2021 0400 by Carla Gilmore RN  Medication Review/Management: medications reviewed  Taken 7/11/2021 0300 by Carla Gilmore RN  Medication Review/Management: medications reviewed  Taken 7/11/2021 0100 by Carla Gilmore RN  Medication Review/Management: medications reviewed  Taken 7/11/2021 0000 by Carla Gilmore RN  Medication Review/Management: medications reviewed  Taken 7/10/2021 2300 by Carla Gilmore RN  Medication Review/Management: medications reviewed  Taken 7/10/2021 2100 by Carla Gilmore RN  Medication Review/Management: medications reviewed  Taken 7/10/2021 2042 by Carla Gilmore RN  Medication Review/Management: medications reviewed  Taken 7/10/2021 1900 by Carla Gilmore RN  Medication Review/Management: medications reviewed  Intervention: Promote Injury-Free Environment  Recent Flowsheet Documentation  Taken 7/11/2021 0500 by Carla Gilmore RN  Safety Promotion/Fall Prevention: safety round/check completed  Taken 7/11/2021 0400 by Carla Gilmore RN  Safety Promotion/Fall Prevention:   assistive device/personal items within reach   clutter free environment maintained   muscle strengthening facilitated    nonskid shoes/slippers when out of bed   room organization consistent   safety round/check completed  Taken 7/11/2021 0300 by Carla Gilmore RN  Safety Promotion/Fall Prevention: safety round/check completed  Taken 7/11/2021 0100 by Carla Gilmore RN  Safety Promotion/Fall Prevention: safety round/check completed  Taken 7/11/2021 0000 by Carla Gilmore RN  Safety Promotion/Fall Prevention:   assistive device/personal items within reach   clutter free environment maintained   lighting adjusted   muscle strengthening facilitated   nonskid shoes/slippers when out of bed   room organization consistent   safety round/check completed  Taken 7/10/2021 2300 by Carla Gilmore RN  Safety Promotion/Fall Prevention: safety round/check completed  Taken 7/10/2021 2100 by Carla Gilmore RN  Safety Promotion/Fall Prevention: safety round/check completed  Taken 7/10/2021 2042 by Carla Gilmore RN  Safety Promotion/Fall Prevention:   assistive device/personal items within reach   clutter free environment maintained   muscle strengthening facilitated   nonskid shoes/slippers when out of bed   room organization consistent   safety round/check completed  Taken 7/10/2021 1900 by Carla Gilmore RN  Safety Promotion/Fall Prevention: safety round/check completed     Problem: Skin Injury Risk Increased  Goal: Skin Health and Integrity  Outcome: Ongoing, Progressing  Intervention: Optimize Skin Protection  Recent Flowsheet Documentation  Taken 7/11/2021 0500 by Carla Gilmore RN  Head of Bed (HOB): HOB at 30 degrees  Taken 7/11/2021 0400 by Carla Gilmore RN  Pressure Reduction Techniques:   frequent weight shift encouraged   heels elevated off bed   weight shift assistance provided  Head of Bed (HOB): HOB at 30 degrees  Pressure Reduction Devices:   alternating pressure pump (ADD)   foam padding utilized   heel offloading device utilized   positioning supports utilized   pressure-redistributing mattress utilized    specialty bed utilized  Skin Protection:   adhesive use limited   incontinence pads utilized   transparent dressing maintained   tubing/devices free from skin contact  Taken 7/11/2021 0300 by Carla Gilmore RN  Head of Bed (HOB): HOB at 30 degrees  Taken 7/11/2021 0100 by Carla Gilmore RN  Head of Bed (HOB): HOB at 30 degrees  Taken 7/11/2021 0000 by Carla Gilmore RN  Pressure Reduction Techniques:   frequent weight shift encouraged   pressure points protected   weight shift assistance provided  Head of Bed (HOB): HOB at 30 degrees  Pressure Reduction Devices:   alternating pressure pump (ADD)   foam padding utilized   heel offloading device utilized   positioning supports utilized   pressure-redistributing mattress utilized   specialty bed utilized  Skin Protection:   adhesive use limited   incontinence pads utilized   transparent dressing maintained   tubing/devices free from skin contact  Taken 7/10/2021 2300 by Carla Gilmore RN  Head of Bed (HOB): HOB at 30-45 degrees  Taken 7/10/2021 2100 by Carla Gilmore RN  Head of Bed (HOB): HOB at 30-45 degrees  Taken 7/10/2021 2042 by Carla Gilmore RN  Pressure Reduction Techniques:   frequent weight shift encouraged   heels elevated off bed   rest period provided between sit times   weight shift assistance provided  Head of Bed (HOB): HOB at 30-45 degrees  Pressure Reduction Devices:   alternating pressure pump (ADD)   foam padding utilized   heel offloading device utilized   positioning supports utilized   pressure-redistributing mattress utilized   specialty bed utilized  Skin Protection:   adhesive use limited   incontinence pads utilized   transparent dressing maintained   tubing/devices free from skin contact  Taken 7/10/2021 1900 by Carla Gilmore RN  Head of Bed ([facility]): HOB at 30 degrees  Intervention: Promote and Optimize Oral Intake  Recent Flowsheet Documentation  Taken 7/10/2021 2042 by Carla Gilmore RN  Oral Nutrition Promotion:  rest periods promoted     Problem: Adult Inpatient Plan of Care  Goal: Plan of Care Review  Outcome: Ongoing, Progressing  Goal: Patient-Specific Goal (Individualized)  Outcome: Ongoing, Progressing  Goal: Absence of Hospital-Acquired Illness or Injury  Outcome: Ongoing, Progressing  Intervention: Identify and Manage Fall Risk  Recent Flowsheet Documentation  Taken 7/11/2021 0500 by Carla Gilmore RN  Safety Promotion/Fall Prevention: safety round/check completed  Taken 7/11/2021 0400 by Carla Gilmore RN  Safety Promotion/Fall Prevention:   assistive device/personal items within reach   clutter free environment maintained   muscle strengthening facilitated   nonskid shoes/slippers when out of bed   room organization consistent   safety round/check completed  Taken 7/11/2021 0300 by Carla Gilmore RN  Safety Promotion/Fall Prevention: safety round/check completed  Taken 7/11/2021 0100 by Carla Gilmore RN  Safety Promotion/Fall Prevention: safety round/check completed  Taken 7/11/2021 0000 by Carla Gilmore RN  Safety Promotion/Fall Prevention:   assistive device/personal items within reach   clutter free environment maintained   lighting adjusted   muscle strengthening facilitated   nonskid shoes/slippers when out of bed   room organization consistent   safety round/check completed  Taken 7/10/2021 2300 by Carla Gilmore RN  Safety Promotion/Fall Prevention: safety round/check completed  Taken 7/10/2021 2100 by Carla Gilmore RN  Safety Promotion/Fall Prevention: safety round/check completed  Taken 7/10/2021 2042 by Carla Gilmore RN  Safety Promotion/Fall Prevention:   assistive device/personal items within reach   clutter free environment maintained   muscle strengthening facilitated   nonskid shoes/slippers when out of bed   room organization consistent   safety round/check completed  Taken 7/10/2021 1900 by Carla Gilmore RN  Safety Promotion/Fall Prevention: safety round/check  completed  Intervention: Prevent Skin Injury  Recent Flowsheet Documentation  Taken 7/11/2021 0500 by Carla Gilmore RN  Body Position: position changed independently  Taken 7/11/2021 0400 by Carla Gilmore RN  Body Position: position changed independently  Skin Protection:   adhesive use limited   incontinence pads utilized   transparent dressing maintained   tubing/devices free from skin contact  Taken 7/11/2021 0300 by Carla Gilmore RN  Body Position: position changed independently  Taken 7/11/2021 0100 by Carla Gilmore RN  Body Position:   position changed independently   weight shift assistance provided  Taken 7/11/2021 0000 by Carla Gilmore RN  Body Position:   position changed independently   weight shift assistance provided  Skin Protection:   adhesive use limited   incontinence pads utilized   transparent dressing maintained   tubing/devices free from skin contact  Taken 7/10/2021 2300 by Carla Gilmore RN  Body Position:   position changed independently   weight shift assistance provided  Taken 7/10/2021 2100 by Carla Gilmore RN  Body Position:   position changed independently   weight shift assistance provided  Taken 7/10/2021 2042 by Carla Gilmore RN  Body Position:   position changed independently   weight shift assistance provided  Skin Protection:   adhesive use limited   incontinence pads utilized   transparent dressing maintained   tubing/devices free from skin contact  Taken 7/10/2021 1900 by Carla Gilmore RN  Body Position:   position changed independently   weight shift assistance provided  Intervention: Prevent and Manage VTE (venous thromboembolism) Risk  Recent Flowsheet Documentation  Taken 7/11/2021 0400 by Carla Gilmore RN  VTE Prevention/Management: (integrilin gtt) other (see comments)  Taken 7/11/2021 0000 by Carla Gilmore RN  VTE Prevention/Management: (integrilin gtt) other (see comments)  Taken 7/10/2021 2042 by Carla Gilmore RN  VTE  Prevention/Management: (integrilin gtt) other (see comments)  Intervention: Prevent Infection  Recent Flowsheet Documentation  Taken 7/11/2021 0400 by Carla Gilmore RN  Infection Prevention:   cohorting utilized   environmental surveillance performed   equipment surfaces disinfected   hand hygiene promoted   personal protective equipment utilized   rest/sleep promoted   single patient room provided   visitors restricted/screened  Taken 7/11/2021 0000 by Carla Gilmore RN  Infection Prevention:   cohorting utilized   environmental surveillance performed   equipment surfaces disinfected   hand hygiene promoted   personal protective equipment utilized   rest/sleep promoted   single patient room provided   visitors restricted/screened  Taken 7/10/2021 2042 by Carla Gilmore RN  Infection Prevention:   cohorting utilized   environmental surveillance performed   equipment surfaces disinfected   hand hygiene promoted   personal protective equipment utilized   rest/sleep promoted   single patient room provided   visitors restricted/screened  Goal: Optimal Comfort and Wellbeing  Outcome: Ongoing, Progressing  Intervention: Provide Person-Centered Care  Recent Flowsheet Documentation  Taken 7/11/2021 0400 by Carla Gilmore RN  Trust Relationship/Rapport:   care explained   choices provided   emotional support provided   empathic listening provided   questions answered   questions encouraged   reassurance provided   thoughts/feelings acknowledged  Taken 7/11/2021 0000 by Carla Gilmore RN  Trust Relationship/Rapport:   care explained   choices provided   emotional support provided   empathic listening provided   questions answered   questions encouraged   reassurance provided   thoughts/feelings acknowledged  Taken 7/10/2021 2042 by Carla Gilmore RN  Trust Relationship/Rapport:   care explained   choices provided   emotional support provided   empathic listening provided   questions answered   questions  encouraged   reassurance provided   thoughts/feelings acknowledged  Goal: Readiness for Transition of Care  Outcome: Ongoing, Progressing   Goal Outcome Evaluation:

## 2021-07-11 NOTE — PROGRESS NOTES
"        LOS:  LOS: 3 days   Patient Name: Matheus Jolly  Age/Sex: 76 y.o. male  : 1945  MRN: [id number]    Day of Service: 21   Length of Stay: 3  Encounter Provider: Anirudh Stahl MD  No care team member to display    Subjective:     Chief Complaint: f/u anterior STEMI    Subjective:   Seen cont with CP, EKG changed from yesterday from this am, concerning for ongoing ischemia, d/w pt and family extensively,     Back to cath lab to ensure stent ptentcy and latonya 3 flow in all vessels given EKG abn with ST changes compared to prior.     Pt per family not \"a complainer\" and just this am report some CP coniued but better than yesterday.     Confirmed pt received brillenta loading and ASA in addition to dose this am  Patient developed atrial fibrillation with rapid regular response    Current Medications:   Scheduled Meds:amiodarone, 150 mg, Intravenous, Once  aspirin, 243 mg, Oral, Once  aspirin, 81 mg, Oral, Daily  atorvastatin, 20 mg, Oral, Nightly  fentanyl, 50 mcg, Intravenous, Once  metoprolol tartrate, 12.5 mg, Oral, Q12H  ondansetron, 4 mg, Intravenous, Once  spironolactone, 12.5 mg, Oral, Daily  ticagrelor, 180 mg, Oral, Once  ticagrelor, 90 mg, Oral, BID      Continuous Infusions:amiodarone, 1 mg/min        Allergies:  No Known Allergies    Review of Systems   Constitutional: Negative for fever and malaise/fatigue.   HENT: Negative for ear pain and nosebleeds.    Eyes: Negative for blurred vision and double vision.   Cardiovascular: Negative for chest pain, dyspnea on exertion and palpitations.   Respiratory: Negative for cough and shortness of breath.    Skin: Negative for rash.   Musculoskeletal: Negative for joint pain.   Gastrointestinal: Negative for abdominal pain, nausea and vomiting.   Neurological: Negative for focal weakness and headaches.   Psychiatric/Behavioral: Negative for depression. The patient is not nervous/anxious.    All other systems reviewed and are negative.      Objective: "     Temp:  [98.3 °F (36.8 °C)-99.5 °F (37.5 °C)] 99 °F (37.2 °C)  Heart Rate:  [] 81  Resp:  [14-25] 17  BP: (106-134)/(52-80) 117/75     Intake/Output Summary (Last 24 hours) at 7/11/2021 1522  Last data filed at 7/11/2021 1200  Gross per 24 hour   Intake 1169.98 ml   Output 1775 ml   Net -605.02 ml     Body mass index is 27.76 kg/m².      07/09/21  1307 07/10/21  0600 07/11/21  0449   Weight: 84.4 kg (186 lb) 83 kg (182 lb 15.7 oz) 82.8 kg (182 lb 8.7 oz)         Physical Exam:  General Appearance:    Alert, cooperative, in no acute distress, up to chair               Neck:   supple, trachea midline, no thyromegaly, no carotid bruit, no JVD   Lungs:     Clear to auscultation,respirations regular, even and                  Unlabored, no crackles    Heart:    Regular rhythm and normal rate, 70s. normal S1 and S2, 1/6 HARPREET, no gallop   Abdomen:     Normal bowel sounds, no masses, no organomegaly, soft        non-tender, non-distended, no guarding, no rebound                tenderness   Extremities:   Moves all extremities well, no edema, no cyanosis, no             redness   Pulses:   Pulses palpable and equal bilaterally   Skin:   No bleeding, bruising or rash   Neurologic:   Awake, alert, oriented x3         Lab Review:   Results from last 7 days   Lab Units 07/10/21  0524 07/09/21  1345 07/09/21  0621   SODIUM mmol/L 133* 135* 136   POTASSIUM mmol/L 4.1 4.1 4.0   CHLORIDE mmol/L 98 99 97*   CO2 mmol/L 26.0 26.0 28.0   BUN mg/dL 14 15 10   CREATININE mg/dL 0.67* 0.79 0.85   GLUCOSE mg/dL 132* 129* 128*   CALCIUM mg/dL 8.8 9.0 9.0   AST (SGOT) U/L  --  109* 138*   ALT (SGPT) U/L  --  35 39     Results from last 7 days   Lab Units 07/10/21  0524 07/09/21  1345 07/09/21  0621 07/08/21  1734   CK TOTAL U/L  --  698*  --   --    TROPONIN T ng/mL 2.380* 2.720* 2.440* 1.800*     Results from last 7 days   Lab Units 07/11/21  0323 07/10/21  0524   WBC 10*3/mm3 11.00* 12.80*   HEMOGLOBIN g/dL 13.9 13.4   HEMATOCRIT %  40.8 38.9   PLATELETS 10*3/mm3 152 144     Results from last 7 days   Lab Units 07/11/21  0323 07/10/21  0524 07/08/21  1734   INR   --   --  1.06   APTT seconds 29.6* 31.6* 27.6*     Results from last 7 days   Lab Units 07/11/21  0323 07/10/21  0524   MAGNESIUM mg/dL 2.0 1.9     Results from last 7 days   Lab Units 07/09/21  0621   CHOLESTEROL mg/dL 129   TRIGLYCERIDES mg/dL 60   HDL CHOL mg/dL 37*               Recent Radiology:  Imaging Results (Most Recent)     None          ECHOCARDIOGRAM:    Results for orders placed during the hospital encounter of 07/08/21    Adult Transthoracic Echo Complete w/ Color, Spectral and Contrast if Necessary Per Protocol    Interpretation Summary  · Mild dilation of the aortic root is present.  · Estimated left ventricular EF = 45% Estimated left ventricular EF was in disagreement with the calculated left ventricular EF. Left ventricular ejection fraction appears to be 41 - 45%. Left ventricular systolic function is low normal.  · Left ventricular diastolic function is consistent with (grade Ia w/high LAP) impaired relaxation.  · Estimated right ventricular systolic pressure from tricuspid regurgitation is normal (<35 mmHg).    Overall EF 45%  Regional abnormalities as identified consistent with LAD injury, apex, anteroapical, inferoapical, mid apical septum all severely hypokinetic, other segments appear to contract normally  RV size and function normal  Atrial sizes grossly normal  Grade 1 diastolic dysfunction by mitral inflow pattern  No masses or effusion seen  Right atrial pressure estimated 5-8  Trace to mild MR, trace to mild TR no valvular stenoses seen  Structurally normal aortic valve in limited views, no regurgitation or stenosis by Doppler  Pulmonic valve not well seen no stenosis by Doppler  No Doppler evidence of ASD or PFO        I reviewed the patient's new clinical results.    EKG:              Assessment:       ST elevation myocardial infarction (STEMI)  (CMS/[facility])    1. Anterior STEMI  - 7/8/2021: 99% stenosis of the mid LAD status post intervention with Xience 3.5 x 28 drug-eluting stent postdilated to 4.2 and 4.6 mm distal proximal respectively  - troponin 1.8, 2.4, trend troponin  - ASA / Brilinta / statin  - 2D ECHO pending  - on metoprolol, aldactone added    2.  HTN-- borderline bp currently    3. Dyslipidemia  - FLP total 129, HDL 37, LDL 79, triglycerides   4 atrial fibrillation  Patient developed new onset atrial fibrillation  Plan:   ECHO pending  EKG abn with inferior ST elevation different than yesterday with LAD culprit yesterdayw ith successful intervention and post dilation to > 4mm with IVUS verified apposition and LM > 6mm^2  Troponin 1.8, 2.4, continue to trend  Continue DAPT with ASA / Brilinta  Continue on Statin therapy  On aldactone    Patient was taken to cardiac catheterization again because of EKG changes and left arm pain and was noted to have patent stent but poor distal flow and hence was placed on IV Integrilin  Will continue Integrilin for 2 days  2D echo with apical akinesis, overall EF 45% consistent with LAD injury  Left heart cath with apical LAD no reflow, possible distal thromboembolism from thrombus trapped behind the mid LAD stent versus secondary reperfusion injury and microvasculature congestion, very small caliber wraparound LAD unable to open this given size, not amenable to PCI  Treat medically, Integrilin drip 18 hours on background of aspirin and ticagrelor  Stop tomorrow  Continue beta-blocker, Aldactone on board, add low-dose ACE inhibitor if able per hemodynamics  Likely be in the hospital 3-4 more days  Patient had a PT fibrillation with rapid ventricle response  Patient is currently on IV amiodarone drip and also IV digoxin and oral beta-blockers but I will adjust the dose of the beta-blockers.        Electronically signed by Anirudh Stahl MD, 07/10/21, 3:25 PM EDT.

## 2021-07-11 NOTE — PLAN OF CARE
Goal Outcome Evaluation:  Plan of Care Reviewed With: patient, spouse        Progress: declining   Pt had an eipisode of rapid a fib today, was given a dose of dig and on an amiodarone drip , converted back to SR

## 2021-07-12 LAB
ANION GAP SERPL CALCULATED.3IONS-SCNC: 10 MMOL/L (ref 5–15)
APTT PPP: 28.5 SECONDS (ref 61–76.5)
BASOPHILS # BLD AUTO: 0 10*3/MM3 (ref 0–0.2)
BASOPHILS NFR BLD AUTO: 0.4 % (ref 0–1.5)
BUN SERPL-MCNC: 18 MG/DL (ref 8–23)
BUN/CREAT SERPL: 21.4 (ref 7–25)
CALCIUM SPEC-SCNC: 9.1 MG/DL (ref 8.6–10.5)
CHLORIDE SERPL-SCNC: 99 MMOL/L (ref 98–107)
CO2 SERPL-SCNC: 27 MMOL/L (ref 22–29)
CREAT SERPL-MCNC: 0.84 MG/DL (ref 0.76–1.27)
DEPRECATED RDW RBC AUTO: 40.7 FL (ref 37–54)
EOSINOPHIL # BLD AUTO: 0.1 10*3/MM3 (ref 0–0.4)
EOSINOPHIL NFR BLD AUTO: 0.8 % (ref 0.3–6.2)
ERYTHROCYTE [DISTWIDTH] IN BLOOD BY AUTOMATED COUNT: 13.3 % (ref 12.3–15.4)
GFR SERPL CREATININE-BSD FRML MDRD: 89 ML/MIN/1.73
GLUCOSE SERPL-MCNC: 123 MG/DL (ref 65–99)
HCT VFR BLD AUTO: 39.5 % (ref 37.5–51)
HGB BLD-MCNC: 13.6 G/DL (ref 13–17.7)
LYMPHOCYTES # BLD AUTO: 0.9 10*3/MM3 (ref 0.7–3.1)
LYMPHOCYTES NFR BLD AUTO: 9 % (ref 19.6–45.3)
MAGNESIUM SERPL-MCNC: 2 MG/DL (ref 1.6–2.4)
MCH RBC QN AUTO: 30.5 PG (ref 26.6–33)
MCHC RBC AUTO-ENTMCNC: 34.4 G/DL (ref 31.5–35.7)
MCV RBC AUTO: 88.7 FL (ref 79–97)
MONOCYTES # BLD AUTO: 1.4 10*3/MM3 (ref 0.1–0.9)
MONOCYTES NFR BLD AUTO: 14.4 % (ref 5–12)
NEUTROPHILS NFR BLD AUTO: 7.3 10*3/MM3 (ref 1.7–7)
NEUTROPHILS NFR BLD AUTO: 75.4 % (ref 42.7–76)
NRBC BLD AUTO-RTO: 0 /100 WBC (ref 0–0.2)
PLATELET # BLD AUTO: 199 10*3/MM3 (ref 140–450)
PMV BLD AUTO: 9.2 FL (ref 6–12)
POTASSIUM SERPL-SCNC: 4.2 MMOL/L (ref 3.5–5.2)
RBC # BLD AUTO: 4.46 10*6/MM3 (ref 4.14–5.8)
SODIUM SERPL-SCNC: 136 MMOL/L (ref 136–145)
TROPONIN T SERPL-MCNC: 2.54 NG/ML (ref 0–0.03)
WBC # BLD AUTO: 9.7 10*3/MM3 (ref 3.4–10.8)

## 2021-07-12 PROCEDURE — 80048 BASIC METABOLIC PNL TOTAL CA: CPT | Performed by: NURSE PRACTITIONER

## 2021-07-12 PROCEDURE — 25010000002 AMIODARONE PER 30 MG: Performed by: INTERNAL MEDICINE

## 2021-07-12 PROCEDURE — 93010 ELECTROCARDIOGRAM REPORT: CPT | Performed by: INTERNAL MEDICINE

## 2021-07-12 PROCEDURE — 36415 COLL VENOUS BLD VENIPUNCTURE: CPT | Performed by: INTERNAL MEDICINE

## 2021-07-12 PROCEDURE — 85730 THROMBOPLASTIN TIME PARTIAL: CPT | Performed by: INTERNAL MEDICINE

## 2021-07-12 PROCEDURE — 83735 ASSAY OF MAGNESIUM: CPT | Performed by: INTERNAL MEDICINE

## 2021-07-12 PROCEDURE — 85025 COMPLETE CBC W/AUTO DIFF WBC: CPT | Performed by: INTERNAL MEDICINE

## 2021-07-12 PROCEDURE — 93005 ELECTROCARDIOGRAM TRACING: CPT | Performed by: NURSE PRACTITIONER

## 2021-07-12 PROCEDURE — 84484 ASSAY OF TROPONIN QUANT: CPT | Performed by: NURSE PRACTITIONER

## 2021-07-12 PROCEDURE — 99233 SBSQ HOSP IP/OBS HIGH 50: CPT | Performed by: INTERNAL MEDICINE

## 2021-07-12 RX ORDER — AMIODARONE HYDROCHLORIDE 200 MG/1
400 TABLET ORAL EVERY 12 HOURS SCHEDULED
Status: DISCONTINUED | OUTPATIENT
Start: 2021-07-12 | End: 2021-07-14

## 2021-07-12 RX ORDER — SPIRONOLACTONE 25 MG/1
25 TABLET ORAL DAILY
Status: DISCONTINUED | OUTPATIENT
Start: 2021-07-13 | End: 2021-07-14 | Stop reason: HOSPADM

## 2021-07-12 RX ORDER — LISINOPRIL 5 MG/1
2.5 TABLET ORAL
Status: DISCONTINUED | OUTPATIENT
Start: 2021-07-12 | End: 2021-07-13

## 2021-07-12 RX ADMIN — ASPIRIN 81 MG CHEWABLE TABLET 81 MG: 81 TABLET CHEWABLE at 08:46

## 2021-07-12 RX ADMIN — AMIODARONE HYDROCHLORIDE 1 MG/MIN: 50 INJECTION, SOLUTION INTRAVENOUS at 08:46

## 2021-07-12 RX ADMIN — AMIODARONE HYDROCHLORIDE 400 MG: 200 TABLET ORAL at 21:31

## 2021-07-12 RX ADMIN — METOPROLOL TARTRATE 12.5 MG: 25 TABLET, FILM COATED ORAL at 21:30

## 2021-07-12 RX ADMIN — ACETAMINOPHEN 650 MG: 325 TABLET, FILM COATED ORAL at 08:46

## 2021-07-12 RX ADMIN — TICAGRELOR 90 MG: 90 TABLET ORAL at 08:46

## 2021-07-12 RX ADMIN — APIXABAN 5 MG: 5 TABLET, FILM COATED ORAL at 21:31

## 2021-07-12 RX ADMIN — AMIODARONE HYDROCHLORIDE 400 MG: 200 TABLET ORAL at 09:57

## 2021-07-12 RX ADMIN — APIXABAN 5 MG: 5 TABLET, FILM COATED ORAL at 11:28

## 2021-07-12 RX ADMIN — ACETAMINOPHEN 650 MG: 325 TABLET, FILM COATED ORAL at 21:34

## 2021-07-12 RX ADMIN — LISINOPRIL 2.5 MG: 5 TABLET ORAL at 13:24

## 2021-07-12 RX ADMIN — TICAGRELOR 90 MG: 90 TABLET ORAL at 21:30

## 2021-07-12 RX ADMIN — AMIODARONE HYDROCHLORIDE 1 MG/MIN: 50 INJECTION, SOLUTION INTRAVENOUS at 00:59

## 2021-07-12 RX ADMIN — ATORVASTATIN CALCIUM 20 MG: 20 TABLET, FILM COATED ORAL at 21:31

## 2021-07-12 RX ADMIN — SPIRONOLACTONE 12.5 MG: 25 TABLET ORAL at 08:46

## 2021-07-12 RX ADMIN — METOPROLOL TARTRATE 12.5 MG: 25 TABLET, FILM COATED ORAL at 08:46

## 2021-07-12 NOTE — CONSULTS
Pt seen and given Cardiac Rehab brochure and informed of program information, also given CIG, and handouts on HTN, HHD, Stress, Triglycerides, Cholesterol, and Anti platelets. Also given Brilinta copay card

## 2021-07-12 NOTE — PROGRESS NOTES
[facility]        LOS:  LOS: 4 days   Patient Name: Matheus Jolly  Age/Sex: 76 y.o. male  : 1945  MRN: [id number]    Day of Service: 21   Length of Stay: 4  Encounter Provider: JIMBO Jasmine  Place of Service: [facility] CARDIOLOGY  No care team member to display    Subjective:     Chief Complaint: f/u STEMI, afib    Subjective: Patient developed afib yesterday. He has converted to SR. He is on amiodarone gtt at 1mg/min. He received 1 dose of digoxin yesterday. He denies chest pain or SOA. He does mention yesterday while walking when he was in afib he felt short of breath but denies current complaints.  Heart rates in the 60s, wife at bedside, I demonstrated repeat left heart catheterization images to patient and wife today and explained apical occlusion, small vessel disease, could be thromboembolic versus no reflow with significant microvasculature injury, peak troponin is only been 2.5 despite LAD injury as well as complete apical LAD occlusion.  Could be acute on chronic injury as discussed, do expect there to be aneurysmal dilatation of the apex, other areas appear to be working okay, overall EF 45%, anticoagulation will be warranted lifelong, advancing heart failure therapies with lisinopril today at low-dose, Aldactone, remains on low-dose beta-blocker, will continue on amiodarone temporarily now orally    Current Medications:   Scheduled Meds:amiodarone, 150 mg, Intravenous, Once  aspirin, 243 mg, Oral, Once  aspirin, 81 mg, Oral, Daily  atorvastatin, 20 mg, Oral, Nightly  fentanyl, 50 mcg, Intravenous, Once  metoprolol tartrate, 12.5 mg, Oral, Q12H  ondansetron, 4 mg, Intravenous, Once  spironolactone, 12.5 mg, Oral, Daily  ticagrelor, 180 mg, Oral, Once  ticagrelor, 90 mg, Oral, BID      Continuous Infusions:amiodarone, 1 mg/min, Last Rate: 1 mg/min (21 0846)        Allergies:  No Known Allergies    Review of Systems   Constitutional:  Negative.   Cardiovascular: Negative.    Respiratory: Negative.    Neurological: Negative.          Objective:     Temp:  [98.3 °F (36.8 °C)-99.4 °F (37.4 °C)] 98.3 °F (36.8 °C)  Heart Rate:  [] 60  Resp:  [18-19] 18  BP: (106-144)/(63-80) 126/70     Intake/Output Summary (Last 24 hours) at 7/12/2021 0928  Last data filed at 7/12/2021 0729  Gross per 24 hour   Intake 1990.11 ml   Output 2650 ml   Net -659.89 ml     Body mass index is 27.76 kg/m².      07/09/21  1307 07/10/21  0600 07/11/21  0449   Weight: 84.4 kg (186 lb) 83 kg (182 lb 15.7 oz) 82.8 kg (182 lb 8.7 oz)         Physical Exam:  General Appearance:    Alert, cooperative, in no acute distress               Neck:   supple, no JVD no carotid bruits   Lungs:     Clear to auscultation,respirations regular, even and                  unlabored    Heart:    Regular rhythm and normal rate, normal S1 and S2   Abdomen:     Normal bowel sounds, no masses, no organomegaly, soft        non-tender, non-distended, no guarding, no rebound                tenderness   Extremities:   Moves all extremities well, no edema, no cyanosis, no             redness   Pulses:   Pulses palpable and equal bilaterally   Skin:   No bleeding, bruising or rash   Neurologic:   Awake, alert, oriented x3 no deficits grossly     Agree with exam as discussed with nurse practitioner after my face-to-face encounter    Lab Review:   Results from last 7 days   Lab Units 07/10/21  0524 07/09/21  1345 07/09/21  0621   SODIUM mmol/L 133* 135* 136   POTASSIUM mmol/L 4.1 4.1 4.0   CHLORIDE mmol/L 98 99 97*   CO2 mmol/L 26.0 26.0 28.0   BUN mg/dL 14 15 10   CREATININE mg/dL 0.67* 0.79 0.85   GLUCOSE mg/dL 132* 129* 128*   CALCIUM mg/dL 8.8 9.0 9.0   AST (SGOT) U/L  --  109* 138*   ALT (SGPT) U/L  --  35 39     Results from last 7 days   Lab Units 07/10/21  0524 07/09/21  1345 07/09/21  0621 07/08/21  1734   CK TOTAL U/L  --  698*  --   --    TROPONIN T ng/mL 2.380* 2.720* 2.440* 1.800*     Results  from last 7 days   Lab Units 07/12/21  0438 07/11/21  0323   WBC 10*3/mm3 9.70 11.00*   HEMOGLOBIN g/dL 13.6 13.9   HEMATOCRIT % 39.5 40.8   PLATELETS 10*3/mm3 199 152     Results from last 7 days   Lab Units 07/12/21  0438 07/11/21  0323 07/08/21  1734   INR   --   --  1.06   APTT seconds 28.5* 29.6* 27.6*     Results from last 7 days   Lab Units 07/12/21  0438 07/11/21  0323   MAGNESIUM mg/dL 2.0 2.0     Results from last 7 days   Lab Units 07/09/21  0621   CHOLESTEROL mg/dL 129   TRIGLYCERIDES mg/dL 60   HDL CHOL mg/dL 37*               Recent Radiology:  Imaging Results (Most Recent)     None          ECHOCARDIOGRAM:    Results for orders placed during the hospital encounter of 07/08/21    Adult Transthoracic Echo Complete w/ Color, Spectral and Contrast if Necessary Per Protocol    Interpretation Summary  · Mild dilation of the aortic root is present.  · Estimated left ventricular EF = 45% Estimated left ventricular EF was in disagreement with the calculated left ventricular EF. Left ventricular ejection fraction appears to be 41 - 45%. Left ventricular systolic function is low normal.  · Left ventricular diastolic function is consistent with (grade Ia w/high LAP) impaired relaxation.  · Estimated right ventricular systolic pressure from tricuspid regurgitation is normal (<35 mmHg).    Overall EF 45%  Regional abnormalities as identified consistent with LAD injury, apex, anteroapical, inferoapical, mid apical septum all severely hypokinetic, other segments appear to contract normally  RV size and function normal  Atrial sizes grossly normal  Grade 1 diastolic dysfunction by mitral inflow pattern  No masses or effusion seen  Right atrial pressure estimated 5-8  Trace to mild MR, trace to mild TR no valvular stenoses seen  Structurally normal aortic valve in limited views, no regurgitation or stenosis by Doppler  Pulmonic valve not well seen no stenosis by Doppler  No Doppler evidence of ASD or PFO        I  reviewed the patient's new clinical results.    EKG:          Assessment:       ST elevation myocardial infarction (STEMI) (CMS/[facility])    1. Anterior STEMI, recurrent anterior injury with thromboembolic occlusion of the apical LAD not amenable to revascularization  - 7/8/2021: 99% stenosis of the mid LAD status post intervention with Xience 3.5 x 28 drug-eluting stent postdilated to 4.2 and 4.6 mm distal proximal respectively  - 7/9/2021: worsening ST segment changes, ST elevation in inferior leads noted, back to cath lab, no refill of the apical LAD versus thromboembolic event from trapped thrombus in the mid LAD, unsuccessful restoration of flow with wire and passage of undeployed balloon, very small caliber portion of the vessel   - troponin 1.8, 2.4, 2.7, 2.3  - 2D ECHO LVEF 41-45%, grade 1 DD  - on metoprolol, aldactone   - ASA / brilinta / statin therapy, completed 18 hr of Integrilin      2.  chronic systolic and diastolic heart failure  - LVEF 41-45%, grade 1 DD  - aldactone, metoprolol    3. HTN-- borderline bp currently     4. Dyslipidemia  - FLP total 129, HDL 37, LDL 79, triglycerides     5. New onset afib (new problem 7/11/2021)  - CHADS Vasc at least 4- will need long term anticoagulation  - received amiodarone and digoxin  - will convert amiodarone to oral    Plan:   Check ECG today, reveals sinus rhythm with signs of evolving and recurrent anterior apical inferior infarct, second acute event  Patient SR, will convert to oral amiodarone loading dose 400 twice daily  Continue DAPT with ASA / Brilinta, off Integrilin  Will now also need anticoagulation, will start eliquis today, will ultimately continue on Brilinta and Eliquis without aspirin, aspirin to stop in 2 weeks  Continue beta blocker, aldactone titrated up, lisinopril 2.5 daily, titrate up as allowed by hemodynamics    Greater than 35 minutes with face-to-face with the patient on totality of care provided above as well as demonstration of cath  images, occlusion, comparison to first images and revascularization, discussion of plan of care moving forward    Shay Mirza MD, PhD        Tari Mitchell, JIMBO  07/12/21  09:28 EDT

## 2021-07-12 NOTE — CASE MANAGEMENT/SOCIAL WORK
Continued Stay Note  ISMAEL To     Patient Name: Matheus Jolly  MRN: [id number]  Today's Date: 7/12/2021    Admit Date: 7/8/2021    Discharge Plan     Row Name 07/12/21 1220       Plan    Plan  D/C Plan : Home with spouse . Pt set up with [facility] for Brilinta and Eliquis , copay cards given to [facility] for both medications.    Patient/Family in Agreement with Plan  yes        Discharge Codes    No documentation.       Expected Discharge Date and Time     Expected Discharge Date Expected Discharge Time    Jul 10, 2021             Ashely Villafana RN

## 2021-07-12 NOTE — PLAN OF CARE
Goal Outcome Evaluation:  Plan of Care Reviewed With: patient, spouse, sibling        Progress: improving

## 2021-07-13 LAB
BASOPHILS # BLD AUTO: 0 10*3/MM3 (ref 0–0.2)
BASOPHILS NFR BLD AUTO: 0.3 % (ref 0–1.5)
DEPRECATED RDW RBC AUTO: 42.4 FL (ref 37–54)
DIGOXIN SERPL-MCNC: <0.3 NG/ML (ref 0.6–1.2)
EOSINOPHIL # BLD AUTO: 0.1 10*3/MM3 (ref 0–0.4)
EOSINOPHIL NFR BLD AUTO: 0.9 % (ref 0.3–6.2)
ERYTHROCYTE [DISTWIDTH] IN BLOOD BY AUTOMATED COUNT: 13.8 % (ref 12.3–15.4)
HCT VFR BLD AUTO: 38.9 % (ref 37.5–51)
HGB BLD-MCNC: 13.5 G/DL (ref 13–17.7)
LYMPHOCYTES # BLD AUTO: 0.8 10*3/MM3 (ref 0.7–3.1)
LYMPHOCYTES NFR BLD AUTO: 7.9 % (ref 19.6–45.3)
MAGNESIUM SERPL-MCNC: 2.1 MG/DL (ref 1.6–2.4)
MCH RBC QN AUTO: 30.6 PG (ref 26.6–33)
MCHC RBC AUTO-ENTMCNC: 34.7 G/DL (ref 31.5–35.7)
MCV RBC AUTO: 88.2 FL (ref 79–97)
MONOCYTES # BLD AUTO: 1.5 10*3/MM3 (ref 0.1–0.9)
MONOCYTES NFR BLD AUTO: 15.5 % (ref 5–12)
NEUTROPHILS NFR BLD AUTO: 7.2 10*3/MM3 (ref 1.7–7)
NEUTROPHILS NFR BLD AUTO: 75.4 % (ref 42.7–76)
NRBC BLD AUTO-RTO: 0.1 /100 WBC (ref 0–0.2)
PLATELET # BLD AUTO: 217 10*3/MM3 (ref 140–450)
PMV BLD AUTO: 8.9 FL (ref 6–12)
QT INTERVAL: 281 MS
QT INTERVAL: 343 MS
QT INTERVAL: 352 MS
QT INTERVAL: 427 MS
RBC # BLD AUTO: 4.41 10*6/MM3 (ref 4.14–5.8)
WBC # BLD AUTO: 9.6 10*3/MM3 (ref 3.4–10.8)

## 2021-07-13 PROCEDURE — 99232 SBSQ HOSP IP/OBS MODERATE 35: CPT | Performed by: INTERNAL MEDICINE

## 2021-07-13 PROCEDURE — 85025 COMPLETE CBC W/AUTO DIFF WBC: CPT | Performed by: INTERNAL MEDICINE

## 2021-07-13 PROCEDURE — 80162 ASSAY OF DIGOXIN TOTAL: CPT | Performed by: INTERNAL MEDICINE

## 2021-07-13 PROCEDURE — 83735 ASSAY OF MAGNESIUM: CPT | Performed by: INTERNAL MEDICINE

## 2021-07-13 RX ORDER — LISINOPRIL 5 MG/1
5 TABLET ORAL
Status: DISCONTINUED | OUTPATIENT
Start: 2021-07-14 | End: 2021-07-14

## 2021-07-13 RX ADMIN — ASPIRIN 81 MG CHEWABLE TABLET 81 MG: 81 TABLET CHEWABLE at 08:31

## 2021-07-13 RX ADMIN — APIXABAN 5 MG: 5 TABLET, FILM COATED ORAL at 21:33

## 2021-07-13 RX ADMIN — AMIODARONE HYDROCHLORIDE 400 MG: 200 TABLET ORAL at 21:33

## 2021-07-13 RX ADMIN — TICAGRELOR 90 MG: 90 TABLET ORAL at 21:33

## 2021-07-13 RX ADMIN — AMIODARONE HYDROCHLORIDE 400 MG: 200 TABLET ORAL at 08:32

## 2021-07-13 RX ADMIN — LISINOPRIL 2.5 MG: 5 TABLET ORAL at 08:32

## 2021-07-13 RX ADMIN — TICAGRELOR 90 MG: 90 TABLET ORAL at 08:32

## 2021-07-13 RX ADMIN — SPIRONOLACTONE 25 MG: 25 TABLET ORAL at 08:32

## 2021-07-13 RX ADMIN — APIXABAN 5 MG: 5 TABLET, FILM COATED ORAL at 08:32

## 2021-07-13 RX ADMIN — Medication 10 ML: at 08:31

## 2021-07-13 RX ADMIN — ATORVASTATIN CALCIUM 20 MG: 20 TABLET, FILM COATED ORAL at 21:33

## 2021-07-13 NOTE — PROGRESS NOTES
[facility] HEART SPECIALISTS        LOS:  LOS: 5 days   Patient Name: Matheus Jolly  Age/Sex: 76 y.o. male  : 1945  MRN: [id number]    Day of Service: 21   Length of Stay: 5  Encounter Provider: Shay Mirza MD  Place of Service: [facility] CARDIOLOGY  Patient Care Team:  Ho Ro Jr., MD as PCP - General (Family Medicine)  Shay Mirza MD as Consulting Physician (Cardiology)    Subjective:     Chief Complaint: f/u STEMI, afib    Subjective: Pause overnight, beta-blocker on hold, sinus rhythm 60s to 70s at bedside today on my encounter awake alert.  May be due to some sleep apnea overnight, remains on amiodarone with paroxysmal A. fib RVR.  Previously titrated Aldactone and lisinopril, blood pressure tolerating this okay presently      Current Medications:   Scheduled Meds:amiodarone, 400 mg, Oral, Q12H  apixaban, 5 mg, Oral, Q12H  aspirin, 243 mg, Oral, Once  aspirin, 81 mg, Oral, Daily  atorvastatin, 20 mg, Oral, Nightly  fentanyl, 50 mcg, Intravenous, Once  lisinopril, 2.5 mg, Oral, Q24H  ondansetron, 4 mg, Intravenous, Once  spironolactone, 25 mg, Oral, Daily  ticagrelor, 180 mg, Oral, Once  ticagrelor, 90 mg, Oral, BID      Continuous Infusions:     Allergies:  No Known Allergies    Review of Systems   Constitutional: Negative.   Cardiovascular: Negative.    Respiratory: Negative.    Neurological: Negative.          Objective:     Temp:  [97.9 °F (36.6 °C)-98.8 °F (37.1 °C)] 97.9 °F (36.6 °C)  Heart Rate:  [59-79] 65  Resp:  [17-21] 18  BP: (100-148)/(59-83) 125/59     Intake/Output Summary (Last 24 hours) at 2021 1543  Last data filed at 2021 1321  Gross per 24 hour   Intake 755 ml   Output 950 ml   Net -195 ml     Body mass index is 27.76 kg/m².      21  1307 07/10/21  0600 21  0449   Weight: 84.4 kg (186 lb) 83 kg (182 lb 15.7 oz) 82.8 kg (182 lb 8.7 oz)         Physical Exam:  General Appearance:    Alert,  cooperative, in no acute distress               Neck:   supple, no JVD no carotid bruits   Lungs:     Clear to auscultation,respirations regular, even and                  unlabored    Heart:    Regular rhythm and normal rate, normal S1 and S2   Abdomen:     Normal bowel sounds, no masses, no organomegaly, soft        non-tender, non-distended, no guarding, no rebound                tenderness   Extremities:   Moves all extremities well, no edema, no cyanosis, no             redness   Pulses:   Pulses palpable and equal bilaterally   Skin:   No bleeding, bruising or rash   Neurologic:   Awake, alert, oriented x3 no deficits grossly   Unchanged from prior encounter  Agree with exam as discussed with nurse practitioner after my face-to-face encounter    Lab Review:   Results from last 7 days   Lab Units 07/12/21  1101 07/10/21  0524 07/09/21  1345 07/09/21  0621   SODIUM mmol/L 136 133* 135* 136   POTASSIUM mmol/L 4.2 4.1 4.1 4.0   CHLORIDE mmol/L 99 98 99 97*   CO2 mmol/L 27.0 26.0 26.0 28.0   BUN mg/dL 18 14 15 10   CREATININE mg/dL 0.84 0.67* 0.79 0.85   GLUCOSE mg/dL 123* 132* 129* 128*   CALCIUM mg/dL 9.1 8.8 9.0 9.0   AST (SGOT) U/L  --   --  109* 138*   ALT (SGPT) U/L  --   --  35 39     Results from last 7 days   Lab Units 07/12/21  0438 07/10/21  0524 07/09/21  1345 07/09/21  0621 07/08/21  1734   CK TOTAL U/L  --   --  698*  --   --    TROPONIN T ng/mL 2.540* 2.380* 2.720* 2.440* 1.800*     Results from last 7 days   Lab Units 07/13/21  0515 07/12/21  0438   WBC 10*3/mm3 9.60 9.70   HEMOGLOBIN g/dL 13.5 13.6   HEMATOCRIT % 38.9 39.5   PLATELETS 10*3/mm3 217 199     Results from last 7 days   Lab Units 07/12/21  0438 07/11/21  0323 07/08/21  1734   INR   --   --  1.06   APTT seconds 28.5* 29.6* 27.6*     Results from last 7 days   Lab Units 07/13/21  0515 07/12/21  0438   MAGNESIUM mg/dL 2.1 2.0     Results from last 7 days   Lab Units 07/09/21  0621   CHOLESTEROL mg/dL 129   TRIGLYCERIDES mg/dL 60   HDL CHOL  mg/dL 37*               Recent Radiology:  Imaging Results (Most Recent)     None          ECHOCARDIOGRAM:    Results for orders placed during the hospital encounter of 07/08/21    Adult Transthoracic Echo Complete w/ Color, Spectral and Contrast if Necessary Per Protocol    Interpretation Summary  · Mild dilation of the aortic root is present.  · Estimated left ventricular EF = 45% Estimated left ventricular EF was in disagreement with the calculated left ventricular EF. Left ventricular ejection fraction appears to be 41 - 45%. Left ventricular systolic function is low normal.  · Left ventricular diastolic function is consistent with (grade Ia w/high LAP) impaired relaxation.  · Estimated right ventricular systolic pressure from tricuspid regurgitation is normal (<35 mmHg).    Overall EF 45%  Regional abnormalities as identified consistent with LAD injury, apex, anteroapical, inferoapical, mid apical septum all severely hypokinetic, other segments appear to contract normally  RV size and function normal  Atrial sizes grossly normal  Grade 1 diastolic dysfunction by mitral inflow pattern  No masses or effusion seen  Right atrial pressure estimated 5-8  Trace to mild MR, trace to mild TR no valvular stenoses seen  Structurally normal aortic valve in limited views, no regurgitation or stenosis by Doppler  Pulmonic valve not well seen no stenosis by Doppler  No Doppler evidence of ASD or PFO        I reviewed the patient's new clinical results.    EKG:          Assessment:       ST elevation myocardial infarction (STEMI) (CMS/HCC)    1. Anterior STEMI, recurrent anterior injury with thromboembolic occlusion of the apical LAD not amenable to revascularization  - 7/8/2021: 99% stenosis of the mid LAD status post intervention with Xience 3.5 x 28 drug-eluting stent postdilated to 4.2 and 4.6 mm distal proximal respectively  - 7/9/2021: worsening ST segment changes, ST elevation in inferior leads noted, back to cath lab,  no refill of the apical LAD versus thromboembolic event from trapped thrombus in the mid LAD, unsuccessful restoration of flow with wire and passage of undeployed balloon, very small caliber portion of the vessel   - troponin 1.8, 2.4, 2.7, 2.3  - 2D ECHO LVEF 41-45%, grade 1 DD  - on metoprolol, aldactone   - ASA / brilinta / statin therapy, completed 18 hr of Integrilin      2.  chronic systolic and diastolic heart failure  - LVEF 41-45%, grade 1 DD  - aldactone, metoprolol    3. HTN-- borderline bp currently     4. Dyslipidemia  - FLP total 129, HDL 37, LDL 79, triglycerides     5. New onset afib (new problem 7/11/2021)  - CHADS Vasc at least 4- will need long term anticoagulation  - received amiodarone and digoxin  - will convert amiodarone to oral    Plan:   Repeat EKG reveals sinus rhythm with signs of evolving and recurrent anterior apical inferior infarct, second acute event  Patient SR, will convert to oral amiodarone loading dose 400 twice daily, continue for 8 g load  Continue DAPT with ASA / Brilinta, off Integrilin  Will now also need anticoagulation, now on Eliquis, will ultimately continue on Brilinta and Eliquis without aspirin, aspirin to stop in 2 weeks  Stop beta-blocker with pause overnight, no QT prolongation seen, aldactone titrated up, lisinopril increased to 5 daily, titrate up as allowed by hemodynamics.    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

## 2021-07-13 NOTE — PLAN OF CARE
Goal Outcome Evaluation:   Pt rested very well though the night, around 2 a.m. pt experienced a cardiac rhythm pause, a EKG strip was printed from the monitor. There were no other issues or concerns. RN to continue to monitor.

## 2021-07-14 ENCOUNTER — READMISSION MANAGEMENT (OUTPATIENT)
Dept: CALL CENTER | Facility: HOSPITAL | Age: 76
End: 2021-07-14

## 2021-07-14 VITALS
DIASTOLIC BLOOD PRESSURE: 67 MMHG | HEART RATE: 70 BPM | SYSTOLIC BLOOD PRESSURE: 115 MMHG | WEIGHT: 179.01 LBS | TEMPERATURE: 98.2 F | OXYGEN SATURATION: 97 % | HEIGHT: 68 IN | BODY MASS INDEX: 27.13 KG/M2 | RESPIRATION RATE: 20 BRPM

## 2021-07-14 LAB
BASOPHILS # BLD AUTO: 0.1 10*3/MM3 (ref 0–0.2)
BASOPHILS NFR BLD AUTO: 0.5 % (ref 0–1.5)
DEPRECATED RDW RBC AUTO: 40.7 FL (ref 37–54)
EOSINOPHIL # BLD AUTO: 0.1 10*3/MM3 (ref 0–0.4)
EOSINOPHIL NFR BLD AUTO: 1.1 % (ref 0.3–6.2)
ERYTHROCYTE [DISTWIDTH] IN BLOOD BY AUTOMATED COUNT: 13.3 % (ref 12.3–15.4)
HCT VFR BLD AUTO: 39.8 % (ref 37.5–51)
HGB BLD-MCNC: 13.8 G/DL (ref 13–17.7)
LYMPHOCYTES # BLD AUTO: 0.8 10*3/MM3 (ref 0.7–3.1)
LYMPHOCYTES NFR BLD AUTO: 7.9 % (ref 19.6–45.3)
MAGNESIUM SERPL-MCNC: 2.1 MG/DL (ref 1.6–2.4)
MCH RBC QN AUTO: 30.4 PG (ref 26.6–33)
MCHC RBC AUTO-ENTMCNC: 34.7 G/DL (ref 31.5–35.7)
MCV RBC AUTO: 87.6 FL (ref 79–97)
MONOCYTES # BLD AUTO: 1.5 10*3/MM3 (ref 0.1–0.9)
MONOCYTES NFR BLD AUTO: 13.9 % (ref 5–12)
NEUTROPHILS NFR BLD AUTO: 76.6 % (ref 42.7–76)
NEUTROPHILS NFR BLD AUTO: 8.1 10*3/MM3 (ref 1.7–7)
NRBC BLD AUTO-RTO: 0.1 /100 WBC (ref 0–0.2)
PLATELET # BLD AUTO: 251 10*3/MM3 (ref 140–450)
PMV BLD AUTO: 9 FL (ref 6–12)
QT INTERVAL: 402 MS
RBC # BLD AUTO: 4.54 10*6/MM3 (ref 4.14–5.8)
WBC # BLD AUTO: 10.6 10*3/MM3 (ref 3.4–10.8)

## 2021-07-14 PROCEDURE — 85025 COMPLETE CBC W/AUTO DIFF WBC: CPT | Performed by: INTERNAL MEDICINE

## 2021-07-14 PROCEDURE — 93005 ELECTROCARDIOGRAM TRACING: CPT | Performed by: INTERNAL MEDICINE

## 2021-07-14 PROCEDURE — 99239 HOSP IP/OBS DSCHRG MGMT >30: CPT | Performed by: INTERNAL MEDICINE

## 2021-07-14 PROCEDURE — 83735 ASSAY OF MAGNESIUM: CPT | Performed by: INTERNAL MEDICINE

## 2021-07-14 RX ORDER — AMIODARONE HYDROCHLORIDE 200 MG/1
200 TABLET ORAL
Status: DISCONTINUED | OUTPATIENT
Start: 2021-07-14 | End: 2021-07-14 | Stop reason: HOSPADM

## 2021-07-14 RX ORDER — ATORVASTATIN CALCIUM 20 MG/1
20 TABLET, FILM COATED ORAL NIGHTLY
Qty: 30 TABLET | Refills: 3 | Status: SHIPPED | OUTPATIENT
Start: 2021-07-14 | End: 2021-11-08 | Stop reason: SDUPTHER

## 2021-07-14 RX ORDER — LISINOPRIL 10 MG/1
10 TABLET ORAL
Qty: 30 TABLET | Refills: 3 | Status: SHIPPED | OUTPATIENT
Start: 2021-07-15 | End: 2021-07-28 | Stop reason: DRUGHIGH

## 2021-07-14 RX ORDER — AMIODARONE HYDROCHLORIDE 200 MG/1
200 TABLET ORAL
Qty: 30 TABLET | Refills: 1 | Status: SHIPPED | OUTPATIENT
Start: 2021-07-15 | End: 2021-07-28 | Stop reason: SDUPTHER

## 2021-07-14 RX ORDER — SPIRONOLACTONE 25 MG/1
25 TABLET ORAL DAILY
Qty: 30 TABLET | Refills: 3 | Status: SHIPPED | OUTPATIENT
Start: 2021-07-15 | End: 2021-07-28 | Stop reason: SDUPTHER

## 2021-07-14 RX ORDER — LISINOPRIL 5 MG/1
10 TABLET ORAL
Status: DISCONTINUED | OUTPATIENT
Start: 2021-07-14 | End: 2021-07-14 | Stop reason: HOSPADM

## 2021-07-14 RX ORDER — ASPIRIN 81 MG/1
81 TABLET, CHEWABLE ORAL DAILY
Qty: 30 TABLET | Refills: 0 | Status: SHIPPED | OUTPATIENT
Start: 2021-07-15 | End: 2021-08-15

## 2021-07-14 RX ADMIN — ASPIRIN 81 MG CHEWABLE TABLET 81 MG: 81 TABLET CHEWABLE at 09:28

## 2021-07-14 RX ADMIN — TICAGRELOR 90 MG: 90 TABLET ORAL at 09:28

## 2021-07-14 RX ADMIN — LISINOPRIL 10 MG: 5 TABLET ORAL at 09:28

## 2021-07-14 RX ADMIN — SPIRONOLACTONE 25 MG: 25 TABLET ORAL at 09:28

## 2021-07-14 RX ADMIN — Medication 10 ML: at 09:28

## 2021-07-14 RX ADMIN — APIXABAN 5 MG: 5 TABLET, FILM COATED ORAL at 09:28

## 2021-07-14 RX ADMIN — AMIODARONE HYDROCHLORIDE 200 MG: 200 TABLET ORAL at 09:27

## 2021-07-14 NOTE — PLAN OF CARE
"Goal Outcome Evaluation:            Vitals stable overnight. Heart rhythm still shows some ST elevation. Patient c/o intermittent mild chest \"soreness\".     "

## 2021-07-14 NOTE — SIGNIFICANT NOTE
07/14/21 1256   Patient Belongings   Were the patient bedside belongings sent home? Yes   Belongings Sent to Safe None   Belongings Retrieved from Security & Sent Home N/A   Medications Retrieved from Pharmacy & Sent Home N/A   Belongings sent with: Patient - Matheus Jolly

## 2021-07-14 NOTE — OUTREACH NOTE
Prep Survey      Responses   [facility] patient discharged from?  [facility]   Is LACE score < 7 ?  No   Emergency Room discharge w/ pulse ox?  No   Eligibility  [facility]   Date of Admission  07/08/21   Date of Discharge  07/14/21   Discharge Disposition  Home or Self Care   Discharge diagnosis  Anterior STEMI   Does the patient have one of the following disease processes/diagnoses(primary or secondary)?  Acute MI (STEMI,NSTEMI)   Does the patient have Home health ordered?  No   Is there a DME ordered?  No   Prep survey completed?  Yes          Eva Murrieta RN

## 2021-07-14 NOTE — PROGRESS NOTES
[facility] HEART SPECIALISTS        LOS:  LOS: 6 days   Patient Name: Matheus Jolly  Age/Sex: 76 y.o. male  : 1945  MRN: [id number]    Day of Service: 21   Length of Stay: 6  Encounter Provider: JIMBO Jasmine  Place of Service: [facility] CARDIOLOGY  Patient Care Team:  Ho Ro Jr., MD as PCP - General (Family Medicine)  Shay Mirza MD as Consulting Physician (Cardiology)    Subjective:     Chief Complaint: f/u STEMI, afib    Subjective: Patient sitting up in chair, he reports feeling well today. He feels as if he has more energy today. No bradycardia or afib on telemetry. SR. Blood pressure stable. No chest pain or SOA. Still evolving MI changes present on ECG And telemetry.    Discussed medication adherence with aspirin and Brilinta along with apixaban with triple therapy, at 30 days he will stop his aspirin and continue ticagrelor and apixaban, he will follow with me in the interim in 1 to 2 weeks.  He is to call the clinic for any unexplained swelling, palpitations, dizziness passing out or other concerning signs or symptoms    Greater than 35 minutes was spent face-to-face with the patient as well as in coordination of care on the floor in the ICU today, review of all his medicines, review of follow-up, review of concerning signs or symptoms, demonstration of catheterization images with revascularization, discussion over apical aneurysm which is likely given EKG along with occluded apical LAD with no reflow.  Hemodynamically electrically stable.  His medicines have been optimized and discussed.    Current Medications:   Scheduled Meds:amiodarone, 200 mg, Oral, Q24H  apixaban, 5 mg, Oral, Q12H  aspirin, 243 mg, Oral, Once  aspirin, 81 mg, Oral, Daily  atorvastatin, 20 mg, Oral, Nightly  fentanyl, 50 mcg, Intravenous, Once  lisinopril, 10 mg, Oral, Q24H  ondansetron, 4 mg, Intravenous, Once  spironolactone, 25 mg, Oral, Daily  ticagrelor, 180  mg, Oral, Once  ticagrelor, 90 mg, Oral, BID      Continuous Infusions:     Allergies:  No Known Allergies    Review of Systems   Constitutional: Negative.   Cardiovascular: Negative.  Negative for chest pain, dyspnea on exertion and leg swelling.   Respiratory: Negative.  Negative for shortness of breath.    Neurological: Negative.          Objective:     Temp:  [97.9 °F (36.6 °C)-98.4 °F (36.9 °C)] 98.2 °F (36.8 °C)  Heart Rate:  [61-73] 61  Resp:  [20] 20  BP: (106-153)/(59-91) 143/74     Intake/Output Summary (Last 24 hours) at 7/14/2021 1024  Last data filed at 7/14/2021 0929  Gross per 24 hour   Intake 720 ml   Output 2950 ml   Net -2230 ml     Body mass index is 27.22 kg/m².      07/10/21  0600 07/11/21  0449 07/14/21  0823   Weight: 83 kg (182 lb 15.7 oz) 82.8 kg (182 lb 8.7 oz) 81.2 kg (179 lb 0.2 oz)         Physical Exam:  General Appearance:    Alert, cooperative, in no acute distress               Neck:   supple,  no JVD   Lungs:     Clear to auscultation,respirations regular, even and                  unlabored    Heart:    Regular rhythm and normal rate, normal S1 and S2   Abdomen:     Normal bowel sounds, no masses, no organomegaly, soft        non-tender, non-distended, no guarding, no rebound                tenderness   Extremities:   Moves all extremities well, no edema, no cyanosis, no             redness   Pulses:   Pulses palpable and equal bilaterally   Skin:   No bleeding, bruising or rash   Neurologic:   Awake, alert, oriented x3   Agree as discussed face-to-face after my encounter and exam with the patient      Lab Review:   Results from last 7 days   Lab Units 07/12/21  1101 07/10/21  0524 07/09/21  1345 07/09/21  0621   SODIUM mmol/L 136 133* 135* 136   POTASSIUM mmol/L 4.2 4.1 4.1 4.0   CHLORIDE mmol/L 99 98 99 97*   CO2 mmol/L 27.0 26.0 26.0 28.0   BUN mg/dL 18 14 15 10   CREATININE mg/dL 0.84 0.67* 0.79 0.85   GLUCOSE mg/dL 123* 132* 129* 128*   CALCIUM mg/dL 9.1 8.8 9.0 9.0   AST  (SGOT) U/L  --   --  109* 138*   ALT (SGPT) U/L  --   --  35 39     Results from last 7 days   Lab Units 07/12/21  0438 07/10/21  0524 07/09/21  1345 07/09/21  0621 07/08/21  1734   CK TOTAL U/L  --   --  698*  --   --    TROPONIN T ng/mL 2.540* 2.380* 2.720* 2.440* 1.800*     Results from last 7 days   Lab Units 07/14/21  0615 07/13/21  0515   WBC 10*3/mm3 10.60 9.60   HEMOGLOBIN g/dL 13.8 13.5   HEMATOCRIT % 39.8 38.9   PLATELETS 10*3/mm3 251 217     Results from last 7 days   Lab Units 07/12/21  0438 07/11/21  0323 07/08/21  1734   INR   --   --  1.06   APTT seconds 28.5* 29.6* 27.6*     Results from last 7 days   Lab Units 07/14/21  0615 07/13/21  0515   MAGNESIUM mg/dL 2.1 2.1     Results from last 7 days   Lab Units 07/09/21  0621   CHOLESTEROL mg/dL 129   TRIGLYCERIDES mg/dL 60   HDL CHOL mg/dL 37*               Recent Radiology:  Imaging Results (Most Recent)     None          ECHOCARDIOGRAM:    Results for orders placed during the hospital encounter of 07/08/21    Adult Transthoracic Echo Complete w/ Color, Spectral and Contrast if Necessary Per Protocol    Interpretation Summary  · Mild dilation of the aortic root is present.  · Estimated left ventricular EF = 45% Estimated left ventricular EF was in disagreement with the calculated left ventricular EF. Left ventricular ejection fraction appears to be 41 - 45%. Left ventricular systolic function is low normal.  · Left ventricular diastolic function is consistent with (grade Ia w/high LAP) impaired relaxation.  · Estimated right ventricular systolic pressure from tricuspid regurgitation is normal (<35 mmHg).    Overall EF 45%  Regional abnormalities as identified consistent with LAD injury, apex, anteroapical, inferoapical, mid apical septum all severely hypokinetic, other segments appear to contract normally  RV size and function normal  Atrial sizes grossly normal  Grade 1 diastolic dysfunction by mitral inflow pattern  No masses or effusion seen  Right  atrial pressure estimated 5-8  Trace to mild MR, trace to mild TR no valvular stenoses seen  Structurally normal aortic valve in limited views, no regurgitation or stenosis by Doppler  Pulmonic valve not well seen no stenosis by Doppler  No Doppler evidence of ASD or PFO        I reviewed the patient's new clinical results.    EKG:          Assessment:       ST elevation myocardial infarction (STEMI) (CMS/[facility])    1. Anterior STEMI, recurrent anterior injury with thromboembolic occlusion of the apical LAD not amenable to revascularization  - 7/8/2021: 99% stenosis of the mid LAD status post intervention with Xience 3.5 x 28 drug-eluting stent postdilated to 4.2 and 4.6 mm distal proximal respectively  - 7/9/2021: worsening ST segment changes, ST elevation in inferior leads noted, back to cath lab, no refill of the apical LAD versus thromboembolic event from trapped thrombus in the mid LAD, unsuccessful restoration of flow with wire and passage of undeployed balloon, very small caliber portion of the vessel   - troponin 1.8, 2.4, 2.7, 2.3  - 2D ECHO LVEF 41-45%, grade 1 DD  - on aldactone, ACEi, BB stopped secondary to pause  - ASA / brilinta / statin therapy, completed 18 hr of Integrilin      2.  chronic systolic and diastolic heart failure  - LVEF 41-45%, grade 1 DD  - aldactone, lisinopril, metoprolol---- stopped secondary to pause     3. HTN     4. Dyslipidemia  - FLP total 129, HDL 37, LDL 79, triglycerides      5. New onset afib (new problem 7/11/2021)  - CHADS Vasc at least 4- will need long term anticoagulation  - received amiodarone and digoxin  - now on oral amio    Plan:   Repeat EKG continues to reveal sinus rhythm with signs of evolving and recurrent anterior apical inferior infarct, second acute event  Patient SR, on oral amiodarone 200mg daily  Continue DAPT with ASA / Brilinta, triple therapy with Eliquis, stop aspirin at 2 weeks  Will now also need anticoagulation, now on Eliquis, will ultimately  continue on Brilinta and Eliquis without aspirin, aspirin to stop in 2 weeks  Beta blocker stopped secondary to pause, no pauses on telemetry-- lisinopril increased to 10mg daily, aldactone 25mg  Paroxysmal atrial fibrillation, amiodarone 200 daily maintenance dose  No further digoxin  Off beta-blocker  May get outpatient Holter monitor to ensure no further A. fib  Widely patent stents on left heart cath, apically occluded LAD likely high risk for apical aneurysmal dilatation, on anticoagulation which will help prevent any LV thrombus at the apex    Shay Mirza MD, PhD    Okay to discharge today with follow-up 1 to 2 weeks with me, extensive encounter today.        Tari Mitchell, APRN  07/14/21  10:24 EDT

## 2021-07-14 NOTE — SIGNIFICANT NOTE
07/14/21 1259   Discharge of Care   Discharge Mode wheel chair   Discharged Accompanied by family member/friend   Discharge Contact Information if Applicable Morena (niece) [phone]   Discharge Teaching Done  Yes   Learning Method Explanation

## 2021-07-14 NOTE — CASE MANAGEMENT/SOCIAL WORK
Case Management Discharge Note                Selected Continued Care - Discharged on 7/14/2021 Admission date: 7/8/2021 - Discharge disposition: Home or Self Care                     Final Discharge Disposition Code: 01 - home or self-care

## 2021-07-15 ENCOUNTER — TRANSITIONAL CARE MANAGEMENT TELEPHONE ENCOUNTER (OUTPATIENT)
Dept: CALL CENTER | Facility: HOSPITAL | Age: 76
End: 2021-07-15

## 2021-07-15 NOTE — PAYOR COMM NOTE
"This is discharge notice for Darren Rodgers  Reference/Auth # [id number]  Pt discharged routine to home on 7/14/21.    Maria E Santamaria RN, BSN  Utilization Review Nurse  [facility]  Direct & confidential phone # [phone]  Fax # [phone]      Darren Rodgers (76 y.o. Male)     Date of Birth Social Security Number Address Home Phone MRN    1945  [facility] [place] [facility] [phone] [id number]    [place] Marital Status          Non-Nondenominational        Admission Date Admission Type Admitting Provider Attending Provider Department, Room/Bed    7/8/21 Emergency Shay Mirza MD  [facility] CARDIOVASCULAR CARE UNIT, 2207/1    Discharge Date Discharge Disposition Discharge Destination        7/14/2021 Home or Self Care              Attending Provider: (none)   Allergies: No Known Allergies    Isolation: None   Infection: None   Code Status: Prior    Ht: 172.7 cm (68\")   Wt: 81.2 kg (179 lb 0.2 oz)    Admission Cmt: None   Principal Problem: None                Active Insurance as of 7/8/2021     Primary Coverage     Payor Plan Insurance Group Employer/Plan Group    [facility] [employer] [id number]     Payor Plan Address Payor Plan Phone Number Payor Plan Fax Number Effective Dates    [address]   1/13/2001 - None Entered    [place] [address]       Subscriber Name Subscriber Birth Date Member ID       DARREN RODGERS 1945 [id number]                 Emergency Contacts      (Rel.) Home Phone Work Phone Mobile Phone    JOHNSON RODGERS (Spouse) [phone] -- [phone]            Discharge Summary    No notes of this type exist for this encounter.         "

## 2021-07-15 NOTE — OUTREACH NOTE
Call Center TCM Note      Responses   [facility] patient discharged from?  [facility]   Does the patient have one of the following disease processes/diagnoses(primary or secondary)?  Acute MI (STEMI,NSTEMI)   TCM attempt successful?  No   Unsuccessful attempts  Attempt 1          Cari Villafana RN    7/15/2021, 14:12 EDT

## 2021-07-15 NOTE — OUTREACH NOTE
Call Center TCM Note      Responses   [facility] patient discharged from?  [facility]   Does the patient have one of the following disease processes/diagnoses(primary or secondary)?  Acute MI (STEMI,NSTEMI)   TCM attempt successful?  No   Unsuccessful attempts  Attempt 2          Cari Villafana RN    7/15/2021, 14:54 EDT

## 2021-07-16 ENCOUNTER — TRANSITIONAL CARE MANAGEMENT TELEPHONE ENCOUNTER (OUTPATIENT)
Dept: CALL CENTER | Facility: HOSPITAL | Age: 76
End: 2021-07-16

## 2021-07-16 NOTE — OUTREACH NOTE
Call Center TCM Note      Responses   [facility] patient discharged from?  [facility]   Does the patient have one of the following disease processes/diagnoses(primary or secondary)?  Acute MI (STEMI,NSTEMI)   TCM attempt successful?  No   Unsuccessful attempts  Attempt 3          Cari Villafana RN    7/16/2021, 13:03 EDT

## 2021-07-16 NOTE — DISCHARGE SUMMARY
[facility]  Shay Mirza MD, PhD  DISCHARGE SUMMARY      Patient Name: Matheus Jolly  :1945  76 y.o.    Date of Admit: 2021  Date of Discharge:  2021    Discharge Diagnosis:  Problems Addressed this Visit          Cardiac and Vasculature    ST elevation myocardial infarction (STEMI) (CMS/HCC) - Primary    Relevant Medications    ticagrelor (BRILINTA) 90 MG tablet tablet    Other Relevant Orders    Case Request Cath Lab: Left Heart Cath (Completed)    Cardiac Catheterization/Vascular Study (Completed)    Ambulatory Referral to Cardiac Rehab          Diagnoses         Codes Comments    ST elevation myocardial infarction (STEMI), unspecified artery (CMS/HCC)    -  Primary ICD-10-CM: I21.3  ICD-9-CM: 410.90     ST elevation myocardial infarction involving left main coronary artery (CMS/HCC)     ICD-10-CM: I21.01  ICD-9-CM: 410.11         Seen and examined, no acute events overnight, labs stable, hemodynamics are stable on present medicines after anterior STEMI.  No complaints today, ambulatory without any chest pain.  No VT, on stable medicines  No contraindication for discharge  Agree with exam as discussed with nurse practitioner after my face-to-face encounter  Agree with plan extensively discussed with nurse practitioner after my face-to-face encounter as well as the patient extensively at bedside today  Outpatient follow-up 2 to 4 weeks    Greater than 30-minute spent on totality of care on the discharge, patient encounter, coordination of care, med rec etc.      1. Anterior STEMI, recurrent anterior injury with thromboembolic occlusion of the apical LAD not amenable to revascularization  - 2021: 99% stenosis of the mid LAD status post intervention with Xience 3.5 x 28 drug-eluting stent postdilated to 4.2 and 4.6 mm distal proximal respectively  - 2021: worsening ST segment changes, ST elevation in inferior leads noted, back to cath lab, no refill of the apical LAD  versus thromboembolic event from trapped thrombus in the mid LAD, unsuccessful restoration of flow with wire and passage of undeployed balloon, very small caliber portion of the vessel   - troponin 1.8, 2.4, 2.7, 2.3  - 2D ECHO LVEF 41-45%, grade 1 DD  - on aldactone, ACEi, BB stopped secondary to pause  - ASA / brilinta / statin therapy, completed 18 hr of Integrilin      2.  chronic systolic and diastolic heart failure  - LVEF 41-45%, grade 1 DD  - aldactone, lisinopril, metoprolol---- stopped secondary to pause     3. HTN     4. Dyslipidemia  - FLP total 129, HDL 37, LDL 79, triglycerides      5. New onset afib (new problem 7/11/2021)  - CHADS Vasc at least 4- will need long term anticoagulation  - received amiodarone and digoxin  - now on oral amio    Hospital Course:   76-year-old gentleman who presented to the ER with acute onset of chest pain finding anterolateral ST elevation concerning for myocardial infarction the setting of chest pain. Patient was taken emergently to the Cath Lab demonstrating 99% stenosis of the mid LAD status post intervention with Xience 3.5 x 28 drug-eluting stent postdilated to 4.2 and 4.6 mm distal proximal respectively. He did well. Ostium of the left main was also viewed to be stenotic however intravascular ultrasound revealed cross-sectional area most narrowed at 6.4 mm² and 6.8 mm^2 on 2 successive measurements not meeting criteria for intervention. Patient left the Cath Lab hemodynamically electrically stable. He is being transferred to ICU for continued care.      7/9: cont with CP, EKG changed from yesterday from this am, concerning for ongoing ischemia, Back to cath lab to ensure stent ptentcy and latonya 3 flow in all vessels given EKG abn with ST changes compared to prior. Left heart cath with apical LAD no reflow, possible distal thromboembolism from thrombus trapped behind the mid LAD stent versus secondary reperfusion injury and microvasculature congestion, very small  caliber wraparound LAD unable to open this given size, not amenable to PCI  Treat medically, Integrilin drip 18 hours on background of aspirin and ticagrelor  2D echo with apical akinesis, overall EF 45% consistent with LAD injury    7/11 developed afib with RVR, treated with IV amiodarone and digoxin. Converted to SR, initiated on Eliquis    7/13 had pause on telemetry overnight. Beta blocker stopped     7/14: stable for discharge- will leave off beta blocker secondary to pause. Discharge on amiodarone. Will stay on DAPT with ASA  Brilinta statin therapy. Lisinopril and aldactone for heart failure. Follow up as directed    Procedures Performed  Procedure(s):  Left Heart Cath       Consults       Date and Time Order Name Status Description    7/8/2021  5:33 PM Consult Interventional Cardiology and Notify Cath Lab Completed             Pertinent Test Results:   Results from last 7 days   Lab Units 07/12/21  1101   SODIUM mmol/L 136   POTASSIUM mmol/L 4.2   CHLORIDE mmol/L 99   CO2 mmol/L 27.0   BUN mg/dL 18   CREATININE mg/dL 0.84   CALCIUM mg/dL 9.1   GLUCOSE mg/dL 123*     Results from last 7 days   Lab Units 07/12/21  0438 07/10/21  0524   TROPONIN T ng/mL 2.540* 2.380*     @LABRCNT(bnp)@  Results from last 7 days   Lab Units 07/14/21  0615   WBC 10*3/mm3 10.60   HEMOGLOBIN g/dL 13.8   HEMATOCRIT % 39.8   PLATELETS 10*3/mm3 251     Results from last 7 days   Lab Units 07/12/21  0438 07/11/21  0323 07/10/21  0524   APTT seconds 28.5* 29.6* 31.6*     Results from last 7 days   Lab Units 07/14/21  0615   MAGNESIUM mg/dL 2.1           ECHOCARDIOGRAM:    Results for orders placed during the hospital encounter of 07/08/21    Adult Transthoracic Echo Complete w/ Color, Spectral and Contrast if Necessary Per Protocol    Interpretation Summary  · Mild dilation of the aortic root is present.  · Estimated left ventricular EF = 45% Estimated left ventricular EF was in disagreement with the calculated left ventricular EF. Left  ventricular ejection fraction appears to be 41 - 45%. Left ventricular systolic function is low normal.  · Left ventricular diastolic function is consistent with (grade Ia w/high LAP) impaired relaxation.  · Estimated right ventricular systolic pressure from tricuspid regurgitation is normal (<35 mmHg).    Overall EF 45%  Regional abnormalities as identified consistent with LAD injury, apex, anteroapical, inferoapical, mid apical septum all severely hypokinetic, other segments appear to contract normally  RV size and function normal  Atrial sizes grossly normal  Grade 1 diastolic dysfunction by mitral inflow pattern  No masses or effusion seen  Right atrial pressure estimated 5-8  Trace to mild MR, trace to mild TR no valvular stenoses seen  Structurally normal aortic valve in limited views, no regurgitation or stenosis by Doppler  Pulmonic valve not well seen no stenosis by Doppler  No Doppler evidence of ASD or PFO        Condition on Discharge: stable          Discharge Medications     Discharge Medications        New Medications        Instructions Start Date   amiodarone 200 MG tablet  Commonly known as: PACERONE   200 mg, Oral, Every 24 Hours Scheduled      aspirin 81 MG chewable tablet   81 mg, Oral, Daily      atorvastatin 20 MG tablet  Commonly known as: LIPITOR   20 mg, Oral, Nightly      Brilinta 90 MG tablet tablet  Generic drug: ticagrelor   90 mg, Oral, 2 Times Daily      Eliquis 5 MG tablet tablet  Generic drug: apixaban   5 mg, Oral, Every 12 Hours Scheduled      lisinopril 10 MG tablet  Commonly known as: PRINIVIL,ZESTRIL   10 mg, Oral, Every 24 Hours Scheduled      spironolactone 25 MG tablet  Commonly known as: ALDACTONE   25 mg, Oral, Daily               Discharge Diet:   Diet Instructions       Diet: Cardiac      Discharge Diet: Cardiac            Activity at Discharge:   Activity Instructions       Discharge Activity      Rest and relax today  no lifting over 10 lbs x 1 week  no driving for 24  hrs  no tub baths or hot tubs for 1 week  no stairs for 3 days  monitor right groin for s/s of bleeding            Discharge disposition: home    Follow-up Appointments  Future Appointments   Date Time Provider Department Center   7/28/2021  3:45 PM Shay Mirza MD [facility]     Additional Instructions for the Follow-ups that You Need to Schedule       Ambulatory Referral to Cardiac Rehab   As directed              Test Results Pending at Discharge        Shay Mirza MD, PhD    07/16/21  15:08 EDT    Time: > 30 minutes spent on discharge explaining discharge medications, instructions, activity / diet instructions / restrictions, counseling on disease processes, and follow up care / appointments.

## 2021-07-23 ENCOUNTER — READMISSION MANAGEMENT (OUTPATIENT)
Dept: CALL CENTER | Facility: HOSPITAL | Age: 76
End: 2021-07-23

## 2021-07-23 NOTE — OUTREACH NOTE
AMI Week 2 Survey      Responses   [facility] patient discharged from?  [facility]   Does the patient have one of the following disease processes/diagnoses(primary or secondary)?  Acute MI (STEMI,NSTEMI)   Week 2 attempt successful?  Yes   Call start time  1547   Call end time  1550   Discharge diagnosis  Anterior STEMI   Person spoke with today (if not patient) and relationship  Sophia-spouse    Meds reviewed with patient/caregiver?  Yes   Is the patient having any side effects they believe may be caused by any medication additions or changes?  No   Does the patient have all prescriptions related to this admission filled (includes statins,anticoagulants,HTN meds,anti-arrhythmia meds)  Yes   Is the patient taking all medications as directed (includes completed medication regime)?  Yes   Comments regarding appointments  Appt with jane Dang, is on 7/28/21   Does the patient have a primary care provider?   Yes   Does the patient have an appointment with their PCP,cardiologist,or clinic within 7 days of discharge?  No   What is preventing the patient from scheduling follow up appointments within 7 days of discharge?  -- [Pt wants to f/u with jane first]   Nursing Interventions  Advised patient to make appointment   Has the patient kept scheduled appointments due by today?  N/A   Psychosocial issues?  No   Did the patient receive a copy of their discharge instructions?  Yes   Nursing interventions  Reviewed instructions with patient   What is the patient's perception of their health status since discharge?  Improving   Nursing interventions  Nurse provided patient education   Is the patient/caregiver able to teach back signs and symptoms of when to call for help immediately:  Sudden chest discomfort, Sudden discomfort in arms, back, neck or jaw, Shortness of breath at any time, Sudden sweating or clammy skin   Nursing interventions  Nurse provided patient education   Is the patient/caregiver able to teach  back lifestyle changes to help prevent MIs  Quit smoking, Maintaining a healthy weight, Reducing stress   Is the patient/caregiver able to teach back ways to prevent a second heart attack:  Take medications, Follow up with MD   If the patient is a current smoker, are they able to teach back resources for cessation?  Not a smoker   Is the patient/caregiver able to teach back the hierarchy of who to call/visit for symptoms/problems? PCP, Specialist, Home health nurse, Urgent Care, ED, 911  Yes   Week 2 call completed?  Yes          Arielle Lakhani RN

## 2021-07-28 ENCOUNTER — OFFICE VISIT (OUTPATIENT)
Dept: CARDIOLOGY | Facility: CLINIC | Age: 76
End: 2021-07-28

## 2021-07-28 ENCOUNTER — TELEPHONE (OUTPATIENT)
Dept: CARDIAC REHAB | Facility: HOSPITAL | Age: 76
End: 2021-07-28

## 2021-07-28 ENCOUNTER — TELEPHONE (OUTPATIENT)
Dept: FAMILY MEDICINE CLINIC | Facility: CLINIC | Age: 76
End: 2021-07-28

## 2021-07-28 VITALS
RESPIRATION RATE: 18 BRPM | DIASTOLIC BLOOD PRESSURE: 72 MMHG | BODY MASS INDEX: 27.19 KG/M2 | HEART RATE: 73 BPM | WEIGHT: 179.4 LBS | SYSTOLIC BLOOD PRESSURE: 138 MMHG | HEIGHT: 68 IN

## 2021-07-28 DIAGNOSIS — I10 ESSENTIAL HYPERTENSION: Primary | ICD-10-CM

## 2021-07-28 PROCEDURE — 99214 OFFICE O/P EST MOD 30 MIN: CPT | Performed by: INTERNAL MEDICINE

## 2021-07-28 RX ORDER — SPIRONOLACTONE 25 MG/1
25 TABLET ORAL DAILY
Qty: 90 TABLET | Refills: 1 | Status: SHIPPED | OUTPATIENT
Start: 2021-07-28 | End: 2022-02-02 | Stop reason: SDUPTHER

## 2021-07-28 RX ORDER — DIPHENOXYLATE HYDROCHLORIDE AND ATROPINE SULFATE 2.5; .025 MG/1; MG/1
1 TABLET ORAL
COMMUNITY
End: 2021-09-16

## 2021-07-28 RX ORDER — LISINOPRIL 20 MG/1
20 TABLET ORAL DAILY
Qty: 90 TABLET | Refills: 1 | Status: SHIPPED | OUTPATIENT
Start: 2021-07-28 | End: 2021-08-16 | Stop reason: HOSPADM

## 2021-07-28 RX ORDER — AMIODARONE HYDROCHLORIDE 200 MG/1
200 TABLET ORAL
Qty: 90 TABLET | Refills: 1 | Status: SHIPPED | OUTPATIENT
Start: 2021-07-28 | End: 2022-01-28 | Stop reason: SDUPTHER

## 2021-07-28 NOTE — TELEPHONE ENCOUNTER
Called pt to see if he would like to do Cardiac Rehab after seeing him when he was inpatient. Said he would be living with his niece in [place] after discharge. No answer. Voicemail left.

## 2021-07-28 NOTE — TELEPHONE ENCOUNTER
Caller: JOHNSON RODGERS    Relationship to patient: Emergency Contact    Best call back number: [phone] (M)    Additional notes:PATIENTS WIFE CALLED IN TO SCHEDULE AN APPOINTMENT WITH MD CHERISE OCAMPO. PATIENT HAS NOT BEEN SEEN WITHIN THE LAST 3 YEARS. UNABLE TO SCHEDULE APPOINTMENT BECAUSE OF NO NEW PATIENT AVAILABILITY. PATIENT PREFERS TO ONLY BEEN SEEN MY MD CHERISE OCAMPO. ADVISED PATIENT I WOULD SEND A MESSAGE TO SEE IF HE COULD RE-ESTABLISH WITH MD CHERISE OCAMPO. PLEASE CALL AND ADVISE. THANK YOU

## 2021-07-28 NOTE — TELEPHONE ENCOUNTER
Called patient's wife back, scheduled an appt to re-establish with Dr. Ro on August 10th. Adv pt that he must be seen at least once a year going forward to remain a patient. Pt understood, stating he had a stroke recently and will make sure that he keeps all follow up appts.

## 2021-07-29 NOTE — PROGRESS NOTES
Cardiology clinic note  Shay Mirza MD, PhD  Subjective:     Encounter Date:07/28/2021      Patient ID: Matheus Jolly is a 76 y.o. male.    Chief Complaint:  Chief Complaint   Patient presents with   • Hospital Follow Up Visit   Follow-up hospitalization  CAD status post ST elevation myocardial infarction of the LAD  Systolic heart failure EF 40% after acute MI  Ischemic cardiomyopathy    HPI:  History of Present Illness  At the pleasure to see this [age]-year-old gentleman in clinic today after hospitalization with anterior STEMI, Xience drug-eluting stent placement to the mid to proximal LAD, post procedurally either had thromboembolic event to the apical LAD versus microvascular occlusion at the apex, he was ultimately taken back to the Cath Lab a second time with ST elevations and Q waves throughout the inferior leads with large wraparound LAD with no reflow.  Balloon was passed except there was no reflow on second procedure.  Revascularization the small caliber wraparound LAD was abandoned ultimately his EF was 40%.  He had no cardiac arrhythmias, there was preservation of the basal anterior, mid anterolateral and all other segments other than the apex and apical inferior wall which were akinetic.  He had some periprocedural and hospitalization atrial fibrillation was placed on Eliquis for anticoagulation.  He remains on aspirin ticagrelor and Eliquis.  There is no bleeding.  He denies any anginal chest pain, he is on statin therapy and no beta-blocker secondary to prior bradycardia heart rate 60s presently.  He is otherwise doing well with no peripheral edema, NYHA class II shortness of air, CCS class I.  He is well compensated and on goal-directed medical therapy    Blood pressure today is 130 systolic with heart rates in the 60s    Review of systems negative x14 point review of systems except was mentioned above    Historical data copied forward from previous encounters in EMR is unchanged    Assessment  and plan per my encounter today  CAD status post anterior STEMI EF 40%  Ischemic or myopathy  Systolic heart failure EF 40%, presently compensated  Essential hypertension  Paroxysmal atrial fibrillation    Plan today  Continue aspirin and ticagrelor, stop aspirin after 30 days of hospitalization  Continue Eliquis indefinitely  Atorvastatin 20, optimally should be on 40 will evaluate for increase to high-dose therapy  No beta-blocker secondary to prior bradycardia despite cardiomyopathy  Continue amiodarone for prophylaxis of atrial fibrillation which was difficult to control in the hospital  Increase lisinopril from 10 to 20 mg as allowed by blood pressure status post acute MI  Can transition in the future to Entresto if tolerated      Return to clinic in 3 months for further titration of his goal-directed medical therapy  No indication for ICD placement with EF greater than 35%, no secondary ventricular arrhythmias, well compensated presently    Further conditions follow findings clinical course and response to goal-directed medical therapy    It is a pleasure to be involved in his cardiovascular care.  Please call with any questions or concerns  Shay Mirza MD, PhD    Medication risk and benefits and necessity of each discussed with patient today and daughter who was present  The following portions of the patient's history were reviewed and updated as appropriate: allergies, current medications, past family history, past medical history, past social history, past surgical history and problem list.    Problem List:  Patient Active Problem List   Diagnosis   • ST elevation myocardial infarction (STEMI) (CMS/HCC)       Past Medical History:  History reviewed. No pertinent past medical history.    Past Surgical History:  Past Surgical History:   Procedure Laterality Date   • CARDIAC CATHETERIZATION N/A 7/8/2021    Procedure: Left Heart Cath;  Surgeon: Shay Mirza MD;  Location[facility] CATH INVASIVE  "LOCATION;  Service: Cardiology;  Laterality: N/A;   • CARDIAC CATHETERIZATION N/A 7/8/2021    Procedure: Percutaneous Coronary Intervention;  Surgeon: Shay Mirza MD;  Location:  [facility] CATH INVASIVE LOCATION;  Service: Cardiology;  Laterality: N/A;   • CARDIAC CATHETERIZATION N/A 7/9/2021    Procedure: Left Heart Cath;  Surgeon: Shay Mirza MD;  Location:  [facility] CATH INVASIVE LOCATION;  Service: Cardiology;  Laterality: N/A;       Social History:  Social History     Socioeconomic History   • Marital status:      Spouse name: Not on file   • Number of children: Not on file   • Years of education: Not on file   • Highest education level: Not on file   Tobacco Use   • Smoking status: Never Smoker       Allergies:  No Known Allergies    Immunizations:    There is no immunization history on file for this patient.    ROS:  ROS       Objective:         /72 (BP Location: Left arm, Patient Position: Sitting)   Pulse 73   Resp 18   Ht 172.7 cm (68\")   Wt 81.4 kg (179 lb 6.4 oz)   BMI 27.28 kg/m²     Physical Exam    In-Office Procedure(s):  Procedures    ASCVD RIsk Score::  The ASCVD Risk score (Isabelle CLARE Jr., et al., 2013) failed to calculate for the following reasons:    The patient has a prior MI or stroke diagnosis    Recent Radiology:  Imaging Results (Most Recent)     None          Lab Review:   No results displayed because visit has over 200 results.                   Assessment:          Diagnosis Plan   1. Essential hypertension  Comprehensive Metabolic Panel    CBC & Differential   Anterior STEMI  Ischemic cardiomyopathy  Essential hypertension  EF 40%             Shay Mirza MD  07/28/21  .  "

## 2021-08-03 ENCOUNTER — READMISSION MANAGEMENT (OUTPATIENT)
Dept: CALL CENTER | Facility: HOSPITAL | Age: 76
End: 2021-08-03

## 2021-08-03 NOTE — OUTREACH NOTE
AMI Week 3 Survey      Responses   [facility] patient discharged from?  [facility]   Does the patient have one of the following disease processes/diagnoses(primary or secondary)?  Acute MI (STEMI,NSTEMI)   Week 3 attempt successful?  Yes   Call start time  1734   Call end time  1737   Discharge diagnosis  Anterior STEMI   Is patient permission given to speak with other caregiver?  Yes   Person spoke with today (if not patient) and relationship  Sophia-spouse    Meds reviewed with patient/caregiver?  Yes   Does the patient have all prescriptions related to this admission filled (includes statins,anticoagulants,HTN meds,anti-arrhythmia meds)  Yes   Is the patient taking all medications as directed (includes completed medication regime)?  Yes   Medication comments  Lisinopril dose increased by cardiology with visit.    Does the patient have a primary care provider?   Yes   Comments regarding PCP  Seeing Dr Ro on 8/10/21   Has the patient kept scheduled appointments due by today?  Yes [Patient has had f/u with cardiology. ]   Has home health visited the patient within 72 hours of discharge?  N/A   Psychosocial issues?  No   Did the patient receive a copy of their discharge instructions?  Yes   What is the patient's perception of their health status since discharge?  Improving   Is the patient/caregiver able to teach back ways to prevent a second heart attack:  Take medications, Follow up with MD   If the patient is a current smoker, are they able to teach back resources for cessation?  Not a smoker   Is the patient/caregiver able to teach back the hierarchy of who to call/visit for symptoms/problems? PCP, Specialist, Home health nurse, Urgent Care, ED, 911  Yes   Week 3 call completed?  Yes   Wrap up additional comments  Wife states patient doing well. Denies any new questions or concerns this week.           Jazmine Lowery RN

## 2021-08-06 ENCOUNTER — TELEPHONE (OUTPATIENT)
Dept: CARDIOLOGY | Facility: CLINIC | Age: 76
End: 2021-08-06

## 2021-08-06 NOTE — TELEPHONE ENCOUNTER
Spoke with the pharmacy, they want to switch him to Effient, since it's generic, it will save him $200/month. Printed out for Dr. Mirza on Tues, pharmacy is aware.

## 2021-08-06 NOTE — TELEPHONE ENCOUNTER
[facility] pharmacy called in to request a switch to the generic for Brilinta so the patient can save money.     Please call the [facility] back when possible: [phone]    Constantino Isbell

## 2021-08-10 ENCOUNTER — OFFICE VISIT (OUTPATIENT)
Dept: FAMILY MEDICINE CLINIC | Facility: CLINIC | Age: 76
End: 2021-08-10

## 2021-08-10 ENCOUNTER — PATIENT ROUNDING (BHMG ONLY) (OUTPATIENT)
Dept: FAMILY MEDICINE CLINIC | Facility: CLINIC | Age: 76
End: 2021-08-10

## 2021-08-10 VITALS
HEIGHT: 68 IN | RESPIRATION RATE: 16 BRPM | TEMPERATURE: 97.1 F | DIASTOLIC BLOOD PRESSURE: 77 MMHG | BODY MASS INDEX: 27.48 KG/M2 | HEART RATE: 71 BPM | OXYGEN SATURATION: 99 % | SYSTOLIC BLOOD PRESSURE: 133 MMHG | WEIGHT: 181.3 LBS

## 2021-08-10 DIAGNOSIS — I48.11 LONGSTANDING PERSISTENT ATRIAL FIBRILLATION (HCC): Chronic | ICD-10-CM

## 2021-08-10 DIAGNOSIS — E78.49 OTHER HYPERLIPIDEMIA: Chronic | ICD-10-CM

## 2021-08-10 DIAGNOSIS — I21.02 ST ELEVATION MYOCARDIAL INFARCTION INVOLVING LEFT ANTERIOR DESCENDING (LAD) CORONARY ARTERY (HCC): ICD-10-CM

## 2021-08-10 DIAGNOSIS — I10 BENIGN ESSENTIAL HYPERTENSION: Chronic | ICD-10-CM

## 2021-08-10 DIAGNOSIS — I25.10 CORONARY ARTERY DISEASE INVOLVING NATIVE CORONARY ARTERY OF NATIVE HEART WITHOUT ANGINA PECTORIS: Primary | Chronic | ICD-10-CM

## 2021-08-10 PROBLEM — I50.1 ACC/AHA STAGE C LEFT HEART FAILURE WITH DECREASED EJECTION FRACTION: Chronic | Status: ACTIVE | Noted: 2021-08-10

## 2021-08-10 PROBLEM — I50.1 ACC/AHA STAGE C LEFT HEART FAILURE WITH DECREASED EJECTION FRACTION: Status: ACTIVE | Noted: 2021-08-10

## 2021-08-10 PROCEDURE — 99214 OFFICE O/P EST MOD 30 MIN: CPT | Performed by: FAMILY MEDICINE

## 2021-08-10 RX ORDER — PRASUGREL 10 MG/1
TABLET, FILM COATED ORAL
Qty: 35 TABLET | Refills: 5 | Status: SHIPPED | OUTPATIENT
Start: 2021-08-10 | End: 2022-02-01 | Stop reason: SDUPTHER

## 2021-08-10 NOTE — PROGRESS NOTES
"Subjective   Matheus Jolly is a 76 y.o. male.     Chief Complaint   Patient presents with   • Hypertension     reestablishment       HPI chief complaint: Hypertension hyperlipidemia coronary artery disease atrial fibrillation    Patient is a 76-year-old white male comes in for follow-up and maintenance of his current problems and to reestablish care.    Patient's current problems include    1.  Coronary artery disease with ischemic cardiomyopathy/status post recent ST segment elevation MI-new-patient was recently hospitalized because of chest pain.  Patient was found to have ST segment elevation MI.  The patient underwent urgent evaluation by cardiology and underwent left heart catheterization with angioplasty and stenting of the LAD.  Patient states that he was able to be discharged home.  He denied chest pain shortness of breath orthopnea or PND.  Patient is currently on Brilinta 90 mg twice a day lisinopril 20 mg daily spironolactone 25 mg daily.    2.  Atrial fibrillation-new-patient was found to have atrial fibrillation.  Patient was placed on Eliquis 5 mg twice a day and Pacerone 200 mg daily.  He denied episodes of rapid or slow heart rhythm.  Not heart palpitations lightheadedness or dizziness.    3.  Hypertension-new--patient is on Aldactone 25 mg daily lisinopril 20 mg daily.  He denied headache lightheadedness dizziness or chest pain.    4.  Hyperlipidemia-new-patient is on Lipitor 20 mg daily.  Denies myalgias and arthralgias.        The following portions of the patient's history were reviewed and updated as appropriate: allergies, current medications, past family history, past medical history, past social history, past surgical history and problem list.    Review of Systems    Objective     /77 (BP Location: Left arm, Patient Position: Sitting, Cuff Size: Adult)   Pulse 71   Temp 97.1 °F (36.2 °C) (Infrared)   Resp 16   Ht 172.7 cm (68\")   Wt 82.2 kg (181 lb 4.8 oz)   SpO2 99%   BMI 27.57 " kg/m²     Physical Exam  Vitals and nursing note reviewed.   Constitutional:       Appearance: He is well-developed and normal weight.   HENT:      Head: Normocephalic and atraumatic.      Nose: Nose normal.      Mouth/Throat:      Mouth: Mucous membranes are moist.      Pharynx: Oropharynx is clear.   Eyes:      Extraocular Movements: Extraocular movements intact.      Conjunctiva/sclera: Conjunctivae normal.      Pupils: Pupils are equal, round, and reactive to light.   Cardiovascular:      Rate and Rhythm: Normal rate and regular rhythm.      Pulses: Normal pulses.      Heart sounds: Normal heart sounds.   Pulmonary:      Effort: Pulmonary effort is normal.      Breath sounds: Normal breath sounds.   Abdominal:      General: Abdomen is flat. Bowel sounds are normal.      Palpations: Abdomen is soft.   Musculoskeletal:         General: Normal range of motion.      Cervical back: Normal range of motion and neck supple.   Skin:     General: Skin is warm and dry.   Neurological:      Mental Status: He is alert and oriented to person, place, and time.   Psychiatric:         Behavior: Behavior normal.         Thought Content: Thought content normal.         Judgment: Judgment normal.         Comprehensive Metabolic Panel (07/09/2021 13:45)  Troponin (07/09/2021 13:45)  Comprehensive Metabolic Panel (07/09/2021 06:21)  CBC & Differential (07/09/2021 06:21)  Troponin (07/09/2021 06:21)  Lipid Panel (07/09/2021 06:21)  Hemoglobin A1c (07/09/2021 06:21)  Adult Transthoracic Echo Complete w/ Color, Spectral and Contrast if Necessary Per Protocol (07/09/2021 13:28)  Cardiac Catheterization/Vascular Study (07/09/2021 14:23)    Assessment/Plan   Diagnoses and all orders for this visit:    1. Coronary artery disease involving native coronary artery of native heart without angina pectoris (Primary)    2. Longstanding persistent atrial fibrillation (CMS/HCC)    3. Benign essential hypertension    4. Other hyperlipidemia    5. ST  elevation myocardial infarction involving left anterior descending (LAD) coronary artery (CMS/HCC)      Patient Instructions   Continue your current medications and treat,ent.    Follow up in the office in 3 months.    Laboratory testing at that time.      Ho Ro Jr., MD    08/10/21

## 2021-08-10 NOTE — TELEPHONE ENCOUNTER
Ok per dr gama to switch to effient. Stop brilinta. He will have 60mg of effient for his first dose and then 10mg daily of effient there after. Left vm to return my call.

## 2021-08-10 NOTE — PATIENT INSTRUCTIONS
Continue your current medications and treat,ent.    Follow up in the office in 3 months.    Laboratory testing at that time.

## 2021-08-10 NOTE — PROGRESS NOTES
August 10, 2021    Hello, may I speak with Matheus Jolly?    My name is Naya, Practice Manager      I am  with [facility]  [address] [place] [address].    Before we get started may I verify your date of birth? 1945    I am calling to officially welcome you to our practice and ask about your recent visit. Is this a good time to talk? yes    Tell me about your visit with us. What things went well?  Everything. Love  and office staff.       We're always looking for ways to make our patients' experiences even better. Do you have recommendations on ways we may improve?  no. All areas of visit was great.    Overall were you satisfied with your first visit to our practice? yes       I appreciate you taking the time to speak with me today. Is there anything else I can do for you? no      Thank you, and have a great day.

## 2021-08-15 ENCOUNTER — APPOINTMENT (OUTPATIENT)
Dept: GENERAL RADIOLOGY | Facility: HOSPITAL | Age: 76
End: 2021-08-15

## 2021-08-15 ENCOUNTER — HOSPITAL ENCOUNTER (OUTPATIENT)
Facility: HOSPITAL | Age: 76
Setting detail: OBSERVATION
Discharge: HOME | End: 2021-08-16
Attending: EMERGENCY MEDICINE | Admitting: FAMILY MEDICINE

## 2021-08-15 DIAGNOSIS — R07.9 CHEST PAIN, UNSPECIFIED TYPE: Primary | ICD-10-CM

## 2021-08-15 PROBLEM — I24.9 ACUTE CORONARY SYNDROME: Status: ACTIVE | Noted: 2021-08-15

## 2021-08-15 LAB
ALBUMIN SERPL-MCNC: 4.4 G/DL (ref 3.5–5.2)
ALBUMIN/GLOB SERPL: 1.3 G/DL
ALP SERPL-CCNC: 79 U/L (ref 39–117)
ALT SERPL W P-5'-P-CCNC: 24 U/L (ref 1–41)
ANION GAP SERPL CALCULATED.3IONS-SCNC: 9 MMOL/L (ref 5–15)
AST SERPL-CCNC: 23 U/L (ref 1–40)
BILIRUB SERPL-MCNC: 0.7 MG/DL (ref 0–1.2)
BUN SERPL-MCNC: 15 MG/DL (ref 8–23)
BUN/CREAT SERPL: 16.1 (ref 7–25)
CALCIUM SPEC-SCNC: 9 MG/DL (ref 8.6–10.5)
CHLORIDE SERPL-SCNC: 94 MMOL/L (ref 98–107)
CO2 SERPL-SCNC: 26 MMOL/L (ref 22–29)
CREAT SERPL-MCNC: 0.93 MG/DL (ref 0.76–1.27)
DEPRECATED RDW RBC AUTO: 47.7 FL (ref 37–54)
EOSINOPHIL # BLD MANUAL: 0.24 10*3/MM3 (ref 0–0.4)
EOSINOPHIL NFR BLD MANUAL: 4 % (ref 0.3–6.2)
ERYTHROCYTE [DISTWIDTH] IN BLOOD BY AUTOMATED COUNT: 15.1 % (ref 12.3–15.4)
GFR SERPL CREATININE-BSD FRML MDRD: 79 ML/MIN/1.73
GLOBULIN UR ELPH-MCNC: 3.3 GM/DL
GLUCOSE SERPL-MCNC: 107 MG/DL (ref 65–99)
HCT VFR BLD AUTO: 37.4 % (ref 37.5–51)
HGB BLD-MCNC: 13 G/DL (ref 13–17.7)
HOLD SPECIMEN: NORMAL
LYMPHOCYTES # BLD MANUAL: 0.36 10*3/MM3 (ref 0.7–3.1)
LYMPHOCYTES NFR BLD MANUAL: 16 % (ref 5–12)
LYMPHOCYTES NFR BLD MANUAL: 6 % (ref 19.6–45.3)
MCH RBC QN AUTO: 31.1 PG (ref 26.6–33)
MCHC RBC AUTO-ENTMCNC: 34.7 G/DL (ref 31.5–35.7)
MCV RBC AUTO: 89.5 FL (ref 79–97)
METAMYELOCYTES NFR BLD MANUAL: 2 % (ref 0–0)
MONOCYTES # BLD AUTO: 0.96 10*3/MM3 (ref 0.1–0.9)
NEUTROPHILS # BLD AUTO: 4.32 10*3/MM3 (ref 1.7–7)
NEUTROPHILS NFR BLD MANUAL: 71 % (ref 42.7–76)
NEUTS BAND NFR BLD MANUAL: 1 % (ref 0–5)
PLAT MORPH BLD: NORMAL
PLATELET # BLD AUTO: 154 10*3/MM3 (ref 140–450)
PMV BLD AUTO: 7.8 FL (ref 6–12)
POTASSIUM SERPL-SCNC: 4.5 MMOL/L (ref 3.5–5.2)
PROT SERPL-MCNC: 7.7 G/DL (ref 6–8.5)
RBC # BLD AUTO: 4.18 10*6/MM3 (ref 4.14–5.8)
RBC MORPH BLD: NORMAL
SARS-COV-2 RNA PNL SPEC NAA+PROBE: DETECTED
SCAN SLIDE: NORMAL
SODIUM SERPL-SCNC: 129 MMOL/L (ref 136–145)
TROPONIN T SERPL-MCNC: 0.03 NG/ML (ref 0–0.03)
WBC # BLD AUTO: 6 10*3/MM3 (ref 3.4–10.8)
WBC MORPH BLD: NORMAL
WHOLE BLOOD HOLD SPECIMEN: NORMAL

## 2021-08-15 PROCEDURE — 99285 EMERGENCY DEPT VISIT HI MDM: CPT

## 2021-08-15 PROCEDURE — C9803 HOPD COVID-19 SPEC COLLECT: HCPCS

## 2021-08-15 PROCEDURE — 84484 ASSAY OF TROPONIN QUANT: CPT | Performed by: EMERGENCY MEDICINE

## 2021-08-15 PROCEDURE — 80053 COMPREHEN METABOLIC PANEL: CPT | Performed by: EMERGENCY MEDICINE

## 2021-08-15 PROCEDURE — 93005 ELECTROCARDIOGRAM TRACING: CPT

## 2021-08-15 PROCEDURE — G0378 HOSPITAL OBSERVATION PER HR: HCPCS

## 2021-08-15 PROCEDURE — 99218 PR INITIAL OBSERVATION CARE/DAY 30 MINUTES: CPT | Performed by: INTERNAL MEDICINE

## 2021-08-15 PROCEDURE — 84484 ASSAY OF TROPONIN QUANT: CPT | Performed by: INTERNAL MEDICINE

## 2021-08-15 PROCEDURE — 85025 COMPLETE CBC W/AUTO DIFF WBC: CPT | Performed by: EMERGENCY MEDICINE

## 2021-08-15 PROCEDURE — U0003 INFECTIOUS AGENT DETECTION BY NUCLEIC ACID (DNA OR RNA); SEVERE ACUTE RESPIRATORY SYNDROME CORONAVIRUS 2 (SARS-COV-2) (CORONAVIRUS DISEASE [COVID-19]), AMPLIFIED PROBE TECHNIQUE, MAKING USE OF HIGH THROUGHPUT TECHNOLOGIES AS DESCRIBED BY CMS-2020-01-R: HCPCS | Performed by: EMERGENCY MEDICINE

## 2021-08-15 PROCEDURE — 85007 BL SMEAR W/DIFF WBC COUNT: CPT | Performed by: EMERGENCY MEDICINE

## 2021-08-15 PROCEDURE — 71045 X-RAY EXAM CHEST 1 VIEW: CPT

## 2021-08-15 RX ORDER — DIPHENOXYLATE HYDROCHLORIDE AND ATROPINE SULFATE 2.5; .025 MG/1; MG/1
1 TABLET ORAL DAILY
Status: DISCONTINUED | OUTPATIENT
Start: 2021-08-16 | End: 2021-08-16 | Stop reason: HOSPADM

## 2021-08-15 RX ORDER — SPIRONOLACTONE 25 MG/1
25 TABLET ORAL DAILY
Status: DISCONTINUED | OUTPATIENT
Start: 2021-08-16 | End: 2021-08-16 | Stop reason: HOSPADM

## 2021-08-15 RX ORDER — AMIODARONE HYDROCHLORIDE 200 MG/1
200 TABLET ORAL
Status: DISCONTINUED | OUTPATIENT
Start: 2021-08-16 | End: 2021-08-16 | Stop reason: HOSPADM

## 2021-08-15 RX ORDER — SODIUM CHLORIDE 0.9 % (FLUSH) 0.9 %
10 SYRINGE (ML) INJECTION AS NEEDED
Status: DISCONTINUED | OUTPATIENT
Start: 2021-08-15 | End: 2021-08-16 | Stop reason: HOSPADM

## 2021-08-15 RX ORDER — SODIUM CHLORIDE 0.9 % (FLUSH) 0.9 %
10 SYRINGE (ML) INJECTION EVERY 12 HOURS SCHEDULED
Status: DISCONTINUED | OUTPATIENT
Start: 2021-08-15 | End: 2021-08-16 | Stop reason: HOSPADM

## 2021-08-15 RX ORDER — LISINOPRIL 20 MG/1
20 TABLET ORAL DAILY
Status: DISCONTINUED | OUTPATIENT
Start: 2021-08-16 | End: 2021-08-16

## 2021-08-15 RX ORDER — ATORVASTATIN CALCIUM 20 MG/1
20 TABLET, FILM COATED ORAL NIGHTLY
Status: DISCONTINUED | OUTPATIENT
Start: 2021-08-15 | End: 2021-08-16 | Stop reason: HOSPADM

## 2021-08-15 RX ORDER — ONDANSETRON 2 MG/ML
4 INJECTION INTRAMUSCULAR; INTRAVENOUS EVERY 6 HOURS PRN
Status: DISCONTINUED | OUTPATIENT
Start: 2021-08-15 | End: 2021-08-16 | Stop reason: HOSPADM

## 2021-08-15 RX ORDER — ACETAMINOPHEN 325 MG/1
650 TABLET ORAL EVERY 4 HOURS PRN
Status: DISCONTINUED | OUTPATIENT
Start: 2021-08-15 | End: 2021-08-16 | Stop reason: HOSPADM

## 2021-08-15 RX ORDER — PRASUGREL 10 MG/1
10 TABLET, FILM COATED ORAL DAILY
Status: DISCONTINUED | OUTPATIENT
Start: 2021-08-16 | End: 2021-08-16 | Stop reason: HOSPADM

## 2021-08-15 RX ADMIN — Medication 10 ML: at 22:51

## 2021-08-15 RX ADMIN — METOPROLOL TARTRATE 25 MG: 25 TABLET, FILM COATED ORAL at 23:40

## 2021-08-15 RX ADMIN — NITROGLYCERIN 1 INCH: 20 OINTMENT TOPICAL at 18:32

## 2021-08-15 RX ADMIN — APIXABAN 5 MG: 5 TABLET, FILM COATED ORAL at 23:40

## 2021-08-15 RX ADMIN — ATORVASTATIN CALCIUM 20 MG: 20 TABLET, FILM COATED ORAL at 23:40

## 2021-08-15 NOTE — ED PROVIDER NOTES
Subjective   Chief complaint tightness in chest    History of present illness 76-year-old male who had a heart attack about a month ago and had a stent placed who states he had been doing well has been taking his medication Eliquis and Brilinta and today he had gone outside just walking around climb some stairs and he developed some heaviness and pressure in his chest about mid morning this went on for several hours called EMS and he had taken several aspirin prior to arrival by time he got here his pain and discomfort was gone.  No neck arm or jaw pain or shortness of breath dizziness or sweating associated with it.  Symptoms lasted several hours moderate nothing made it worse got better with aspirin now resolved.  No other associated symptoms          Review of Systems   Constitutional: Negative for chills and fever.   HENT: Negative for congestion and sinus pressure.    Eyes: Negative for photophobia and visual disturbance.   Respiratory: Positive for chest tightness. Negative for shortness of breath.    Cardiovascular: Positive for chest pain. Negative for palpitations.   Gastrointestinal: Negative for abdominal pain and vomiting.   Endocrine: Negative for cold intolerance and heat intolerance.   Genitourinary: Negative for difficulty urinating and dysuria.   Musculoskeletal: Negative for arthralgias and back pain.   Skin: Negative for color change and rash.   Neurological: Negative for dizziness and headaches.   Psychiatric/Behavioral: Negative for agitation and behavioral problems.       Past Medical History:   Diagnosis Date   • Afib (CMS/HCC)    • CHF (congestive heart failure) (CMS/HCC)    • Coronary artery disease    • Hearing loss    • Hyperlipidemia    • Hypertension    Coronary stent    No Known Allergies    Past Surgical History:   Procedure Laterality Date   • CARDIAC CATHETERIZATION N/A 7/8/2021    Procedure: Left Heart Cath;  Surgeon: Shay Mirza MD;  Location: [facility] INVASIVE  LOCATION;  Service: Cardiology;  Laterality: N/A;   • CARDIAC CATHETERIZATION N/A 7/8/2021    Procedure: Percutaneous Coronary Intervention;  Surgeon: Shay Mirza MD;  Location: [facility] CATH INVASIVE LOCATION;  Service: Cardiology;  Laterality: N/A;   • CARDIAC CATHETERIZATION N/A 7/9/2021    Procedure: Left Heart Cath;  Surgeon: Shay Mirza MD;  Location:  [facility] CATH INVASIVE LOCATION;  Service: Cardiology;  Laterality: N/A;       Family History   Problem Relation Age of Onset   • Cancer Mother    • Heart disease Father    • Cancer Sister    • Hypertension Brother    • Hyperlipidemia Brother        Social History     Socioeconomic History   • Marital status:      Spouse name: Not on file   • Number of children: Not on file   • Years of education: Not on file   • Highest education level: Not on file   Tobacco Use   • Smoking status: Never Smoker   • Smokeless tobacco: Never Used   Vaping Use   • Vaping Use: Never used   Substance and Sexual Activity   • Alcohol use: Not Currently   • Drug use: Not Currently   • Sexual activity: Defer     Prior to Admission medications    Medication Sig Start Date End Date Taking? Authorizing Provider   amiodarone (PACERONE) 200 MG tablet Take 1 tablet by mouth Daily. 7/28/21  Yes Shay Mirza MD   apixaban (ELIQUIS) 5 MG tablet tablet Take 1 tablet by mouth Every 12 (Twelve) Hours. Indications: Atrial Fibrillation 7/14/21  Yes Tari Mitchell APRN   atorvastatin (LIPITOR) 20 MG tablet Take 1 tablet by mouth Every Night. 7/14/21  Yes Tari Mitchell APRN   lisinopril (PRINIVIL,ZESTRIL) 20 MG tablet Take 1 tablet by mouth Daily. 7/28/21  Yes Shay Mirza MD   multivitamin (multivitamin) tablet tablet Take  by mouth Daily.   Yes Provider, MD Harvey   prasugrel (Effient) 10 MG tablet Take 60mg by mouth on the first day and then take 10mg by mouth daily thereafter.  Patient taking differently: Take 10 mg by mouth Daily. Take 60mg  by mouth on the first day and then take 10mg by mouth daily thereafter. 8/10/21  Yes Shay Mirza MD   spironolactone (ALDACTONE) 25 MG tablet Take 1 tablet by mouth Daily. 7/28/21  Yes Shay Mirza MD   aspirin 81 MG chewable tablet Chew 1 tablet Daily. 7/15/21 8/15/21  Tari Mitchell APRN             Objective   Physical Exam  76-year-old male awake alert no distress resting comfortably.  HEENT extraocular muscles are intact pupils equal round react there is no photophobia mouth clear  Neck supple no adenopathy no JVD no bruits  Lungs clear no retraction no use of accessories  Heart regular without murmur  Abdomen soft without tenderness no pulsatile mass bruits  Extremities pulses are equal throughout upper and lower extremities no edema cords or Homans' sign or evidence of DVT.  Skin warm dry no rashes or cellulitic change  Neurologic awake alert follows commands motor strength normal without focal weakness  Procedures           ED Course      Results for orders placed or performed during the hospital encounter of 08/15/21   COVID-19,CEPHEID/MIKAEL/BDMAX,COR/[facility]/PAD/[facility]MY IN-HOUSE(OR EMERGENT/ADD-ON),NP SWAB IN TRANSPORT MEDIA 3-4 HR TAT, RT-PCR - Swab, Nasopharynx    Specimen: Nasopharynx; Swab   Result Value Ref Range    COVID19 Detected (C) Not Detected - Ref. Range   Comprehensive Metabolic Panel    Specimen: Blood   Result Value Ref Range    Glucose 107 (H) 65 - 99 mg/dL    BUN 15 8 - 23 mg/dL    Creatinine 0.93 0.76 - 1.27 mg/dL    Sodium 129 (L) 136 - 145 mmol/L    Potassium 4.5 3.5 - 5.2 mmol/L    Chloride 94 (L) 98 - 107 mmol/L    CO2 26.0 22.0 - 29.0 mmol/L    Calcium 9.0 8.6 - 10.5 mg/dL    Total Protein 7.7 6.0 - 8.5 g/dL    Albumin 4.40 3.50 - 5.20 g/dL    ALT (SGPT) 24 1 - 41 U/L    AST (SGOT) 23 1 - 40 U/L    Alkaline Phosphatase 79 39 - 117 U/L    Total Bilirubin 0.7 0.0 - 1.2 mg/dL    eGFR Non African Amer 79 >60 mL/min/1.73    Globulin 3.3 gm/dL    A/G Ratio 1.3 g/dL     BUN/Creatinine Ratio 16.1 7.0 - 25.0    Anion Gap 9.0 5.0 - 15.0 mmol/L   Troponin    Specimen: Blood   Result Value Ref Range    Troponin T 0.030 0.000 - 0.030 ng/mL   CBC Auto Differential    Specimen: Blood   Result Value Ref Range    WBC 6.00 3.40 - 10.80 10*3/mm3    RBC 4.18 4.14 - 5.80 10*6/mm3    Hemoglobin 13.0 13.0 - 17.7 g/dL    Hematocrit 37.4 (L) 37.5 - 51.0 %    MCV 89.5 79.0 - 97.0 fL    MCH 31.1 26.6 - 33.0 pg    MCHC 34.7 31.5 - 35.7 g/dL    RDW 15.1 12.3 - 15.4 %    RDW-SD 47.7 37.0 - 54.0 fl    MPV 7.8 6.0 - 12.0 fL    Platelets 154 140 - 450 10*3/mm3   Scan Slide    Specimen: Blood   Result Value Ref Range    Scan Slide     Manual Differential    Specimen: Blood   Result Value Ref Range    Neutrophil % 71.0 42.7 - 76.0 %    Lymphocyte % 6.0 (L) 19.6 - 45.3 %    Monocyte % 16.0 (H) 5.0 - 12.0 %    Eosinophil % 4.0 0.3 - 6.2 %    Bands %  1.0 0.0 - 5.0 %    Metamyelocyte % 2.0 (H) 0.0 - 0.0 %    Neutrophils Absolute 4.32 1.70 - 7.00 10*3/mm3    Lymphocytes Absolute 0.36 (L) 0.70 - 3.10 10*3/mm3    Monocytes Absolute 0.96 (H) 0.10 - 0.90 10*3/mm3    Eosinophils Absolute 0.24 0.00 - 0.40 10*3/mm3    RBC Morphology Normal Normal    WBC Morphology Normal Normal    Platelet Morphology Normal Normal   ECG 12 Lead   Result Value Ref Range    QT Interval 350 ms   Lavender Top   Result Value Ref Range    Extra Tube hold for add-on    Green Top (Gel)   Result Value Ref Range    Extra Tube Hold for add-ons.      XR Chest 1 View    Result Date: 8/15/2021  1. There is an ill-defined opacity in the lateral aspect of the left midlung. This could be due to pneumonia. Chronic change or pleural opacity could cause a similar appearance. There are no  priors available.  Electronically Signed By-Rafaela Beltran MD On:8/15/2021 5:10 PM This report was finalized on [id number] by  Rafaela Beltran MD.    Medications   sodium chloride 0.9 % flush 10 mL (has no administration in time range)   nitroglycerin (NITROSTAT)  ointment 1 inch (1 inch Topical Given 8/15/21 1832)          EKG my interpretation normal sinus rhythm rate of 90 patient does have some residual ST elevation is lateral leads but this is much improved from previous EKG no other septal changes.  Some abnormal EKG but improved from previous EKG                                  MDM  Number of Diagnoses or Management Options  Chest pain, unspecified type  Diagnosis management comments: Medical decision making.  Patient had IV established placed on a cardiac monitor he is already taken aspirin prior to arrival with 1 inch Nitropaste provided he is currently pain-free.  EKG showed some mild ST elevation in his lateral leads compared to his previous EKG this is much improved.  He has no discomfort currently.  I sent this to Dr. Mirza the cardiologist and the patient's cardiologist he reviewed I talked to him on the phone and this seems much improved and would not treat this as an acute myocardial ST elevation MI.  Labs were sent CBC electrolytes troponin were unremarkable.  Chest x-ray questionable density in the left lung suggesting there could be either some pneumonia or some of this pleural thickening.  Patient's had no symptoms pneumonia no cough congestion or shortness of breath.  So I do not suspect this is the case.  See no swelling of legs no palp cords or Homans' sign or suggest DVT or pulmonary embolism or aortic dissection by clinical exam pulses are equal upper lower extremities.  Mediastinum looks okay on the chest x-ray.  Patient made aware of the findings.  He is remained pain-free.  He will need serial troponins already talked to cardiology hospitalist has been paged and the patient be placed in the hospital for further care stable otherwise unremarkable ER course.  Hospitalist notified       Amount and/or Complexity of Data Reviewed  Clinical lab tests: reviewed  Tests in the radiology section of CPT®: reviewed  Discuss the patient with other providers:  yes  Independent visualization of images, tracings, or specimens: yes    Risk of Complications, Morbidity, and/or Mortality  Presenting problems: high  Diagnostic procedures: high  Management options: high    Patient Progress  Patient progress: stable      Final diagnoses:   Chest pain, unspecified type       ED Disposition  ED Disposition     ED Disposition Condition Comment    Decision to Admit  Level of Care: Telemetry [5]   Diagnosis: Chest pain [490253]   Admitting Physician: SCOTT MOREJON [[id number]]   Attending Physician: SCOTT MOREJON [[id number]]            No follow-up provider specified.       Medication List      No changes were made to your prescriptions during this visit.          Dax Emerson MD  08/15/21 [id number]

## 2021-08-16 ENCOUNTER — READMISSION MANAGEMENT (OUTPATIENT)
Dept: CALL CENTER | Facility: HOSPITAL | Age: 76
End: 2021-08-16

## 2021-08-16 VITALS
SYSTOLIC BLOOD PRESSURE: 119 MMHG | WEIGHT: 180 LBS | HEIGHT: 68 IN | RESPIRATION RATE: 20 BRPM | HEART RATE: 82 BPM | OXYGEN SATURATION: 99 % | BODY MASS INDEX: 27.28 KG/M2 | DIASTOLIC BLOOD PRESSURE: 67 MMHG | TEMPERATURE: 98.5 F

## 2021-08-16 LAB — TROPONIN T SERPL-MCNC: 0.04 NG/ML (ref 0–0.03)

## 2021-08-16 PROCEDURE — 99217 PR OBSERVATION CARE DISCHARGE MANAGEMENT: CPT | Performed by: FAMILY MEDICINE

## 2021-08-16 PROCEDURE — 93010 ELECTROCARDIOGRAM REPORT: CPT | Performed by: INTERNAL MEDICINE

## 2021-08-16 PROCEDURE — 99214 OFFICE O/P EST MOD 30 MIN: CPT | Performed by: INTERNAL MEDICINE

## 2021-08-16 PROCEDURE — 93005 ELECTROCARDIOGRAM TRACING: CPT | Performed by: INTERNAL MEDICINE

## 2021-08-16 PROCEDURE — G0378 HOSPITAL OBSERVATION PER HR: HCPCS

## 2021-08-16 RX ORDER — SODIUM CHLORIDE, SODIUM LACTATE, POTASSIUM CHLORIDE, CALCIUM CHLORIDE 600; 310; 30; 20 MG/100ML; MG/100ML; MG/100ML; MG/100ML
50 INJECTION, SOLUTION INTRAVENOUS CONTINUOUS
Status: DISCONTINUED | OUTPATIENT
Start: 2021-08-16 | End: 2021-08-16 | Stop reason: HOSPADM

## 2021-08-16 RX ORDER — METOPROLOL SUCCINATE 25 MG/1
25 TABLET, EXTENDED RELEASE ORAL
Status: DISCONTINUED | OUTPATIENT
Start: 2021-08-16 | End: 2021-08-16

## 2021-08-16 RX ORDER — METOPROLOL SUCCINATE 25 MG/1
25 TABLET, EXTENDED RELEASE ORAL EVERY 12 HOURS SCHEDULED
Status: DISCONTINUED | OUTPATIENT
Start: 2021-08-16 | End: 2021-08-16 | Stop reason: HOSPADM

## 2021-08-16 RX ORDER — NITROGLYCERIN 0.4 MG/1
0.4 TABLET SUBLINGUAL
Qty: 25 TABLET | Refills: 0 | Status: SHIPPED | OUTPATIENT
Start: 2021-08-16 | End: 2022-11-30 | Stop reason: SDUPTHER

## 2021-08-16 RX ORDER — METOPROLOL SUCCINATE 25 MG/1
25 TABLET, EXTENDED RELEASE ORAL DAILY
Qty: 30 TABLET | Refills: 0 | Status: SHIPPED | OUTPATIENT
Start: 2021-08-16 | End: 2021-08-23 | Stop reason: SDUPTHER

## 2021-08-16 RX ADMIN — SODIUM CHLORIDE 250 ML: 9 INJECTION, SOLUTION INTRAVENOUS at 10:36

## 2021-08-16 RX ADMIN — THERA TABS 1 TABLET: TAB at 08:46

## 2021-08-16 RX ADMIN — APIXABAN 5 MG: 5 TABLET, FILM COATED ORAL at 08:46

## 2021-08-16 RX ADMIN — SPIRONOLACTONE 25 MG: 25 TABLET ORAL at 08:46

## 2021-08-16 RX ADMIN — LISINOPRIL 20 MG: 20 TABLET ORAL at 08:47

## 2021-08-16 RX ADMIN — Medication 10 ML: at 08:47

## 2021-08-16 RX ADMIN — AMIODARONE HYDROCHLORIDE 200 MG: 200 TABLET ORAL at 08:46

## 2021-08-16 RX ADMIN — PRASUGREL 10 MG: 10 TABLET, FILM COATED ORAL at 08:46

## 2021-08-16 RX ADMIN — SODIUM CHLORIDE, POTASSIUM CHLORIDE, SODIUM LACTATE AND CALCIUM CHLORIDE 50 ML/HR: 600; 310; 30; 20 INJECTION, SOLUTION INTRAVENOUS at 12:17

## 2021-08-16 NOTE — PLAN OF CARE
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

## 2021-08-16 NOTE — CONSULTS
[facility] CONSULT  Shay Mirza MD, PhD        Subjective:     Encounter Date:08/15/2021      Patient ID: Matheus Jolly is a 76 y.o. male.    Chief Complaint: chest pain     Referring Physician: Dr. Carroll  Agree with narrative as discussed with nurse practitioner after my face-to-face encounter with the patient  HPI:Matheus Jolly is a 76 y.o. male who presents with chest heaviness and pressure. Mr. Jolly is a patient of Dr. Mirza. He has a pmh CAD s/p anterior STEMI, Xience drug-eluting stent placement to the mid to proximal LAD, post procedurally either had thromboembolic event to the apical LAD versus microvascular occlusion at the apex, he was ultimately taken back to the Cath Lab a second time with ST elevations and Q waves throughout the inferior leads with large wraparound LAD with no reflow.  Balloon was passed except there was no reflow on second procedure.  Revascularization the small caliber wraparound LAD was abandoned ultimately his EF was 40%.  He had no cardiac arrhythmias, there was preservation of the basal anterior, mid anterolateral and all other segments other than the apex and apical inferior wall which were akinetic.  He had some periprocedural and hospitalization atrial fibrillation was placed on Eliquis for anticoagulation.  He remains on prasurgrel and Eliquis.     Patient's wife tells me he had walked to the mail box and back which is at an incline and he developed chest tightness and some shortness of breath. Pain was described as heaviness and pressure in the chest that went on for several hours, took 3 baby aspirins, called EMS and given another baby aspirin and brought him to the ED by the time his discomfort was gone.  Denies having neck, arm, or jaw pain or shortness of breath, dizziness, sweating, nausea vomiting or any other complaint.  Patient presented to ER where his troponin levels were 0.03, 0.04.   Dr. 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  Currently the patient is chest pain free.     Patient has tested positive for COVID 19. His CXR revealed ill-defined opacity in the lateral aspect of the left midlung. This could be due to pneumonia.    Review of systems otherwise negative x14 point review of systems except as mentioned above    Historical data copied forward from previous encounters in EMR is unchanged      Past Medical History:   Diagnosis Date   • Afib (CMS/HCC)    • CHF (congestive heart failure) (CMS/HCC)    • Coronary artery disease    • Hearing loss    • Hyperlipidemia    • Hypertension        Past Surgical History:   Procedure Laterality Date   • CARDIAC CATHETERIZATION N/A 7/8/2021    Procedure: Left Heart Cath;  Surgeon: Shay Mirza MD;  Location: [facility] CATH INVASIVE LOCATION;  Service: Cardiology;  Laterality: N/A;   • CARDIAC CATHETERIZATION N/A 7/8/2021    Procedure: Percutaneous Coronary Intervention;  Surgeon: Shay Mirza MD;  Location:  [facility] CATH INVASIVE LOCATION;  Service: Cardiology;  Laterality: N/A;   • CARDIAC CATHETERIZATION N/A 7/9/2021    Procedure: Left Heart Cath;  Surgeon: Shay Mirza MD;  Location:  [facility] CATH INVASIVE LOCATION;  Service: Cardiology;  Laterality: N/A;       Family History   Problem Relation Age of Onset   • Cancer Mother    • Heart disease Father    • Cancer Sister    • Hypertension Brother    • Hyperlipidemia Brother        Social History     Socioeconomic History   • Marital status:      Spouse name: Not on file   • Number  "of children: Not on file   • Years of education: Not on file   • Highest education level: Not on file   Tobacco Use   • Smoking status: Never Smoker   • Smokeless tobacco: Never Used   Vaping Use   • Vaping Use: Never used   Substance and Sexual Activity   • Alcohol use: Not Currently   • Drug use: Not Currently   • Sexual activity: Defer         No Known Allergies    Current Medications:   Scheduled Meds:amiodarone, 200 mg, Oral, Q24H  apixaban, 5 mg, Oral, Q12H  atorvastatin, 20 mg, Oral, Nightly  lisinopril, 20 mg, Oral, Daily  metoprolol tartrate, 25 mg, Oral, Q12H  multivitamin, 1 tablet, Oral, Daily  prasugrel, 10 mg, Oral, Daily  sodium chloride, 250 mL, Intravenous, Once  sodium chloride, 10 mL, Intravenous, Q12H  spironolactone, 25 mg, Oral, Daily      Continuous Infusions:     Review of Systems   Constitutional: Negative.   Cardiovascular: Positive for chest pain.   Respiratory: Negative.    Neurological: Negative.             Objective:         /59 (BP Location: Left arm, Patient Position: Lying)   Pulse 66   Temp 98.6 °F (37 °C) (Oral)   Resp 15   Ht 172.7 cm (68\")   Wt 81.6 kg (180 lb)   SpO2 99%   BMI 27.37 kg/m²       General Appearance:    Alert, cooperative, in no acute distress               Neck:   supple,  no JVD   Lungs:     Clear to auscultation,respirations regular, even and                  unlabored    Heart:    Regular rhythm and normal rate, normal S1 and S2   Abdomen:     Normal bowel sounds, soft   Extremities:   Moves all extremities well, no edema, no cyanosis, no redness   Pulses:   Pulses palpable and equal bilaterally   Skin:   No bleeding, bruising or rash   Neurologic:   Awake, alert, oriented x3     Agree with exam as discussed with nurse practitioner, face-to-face encounter          ASCVD RIsk Score::  The ASCVD Risk score ([place] CLARE Torres., et al., 2013) failed to calculate for the following reasons:    The patient has a prior MI or stroke diagnosis      Lab Review: "     Results from last 7 days   Lab Units 08/15/21  1634   SODIUM mmol/L 129*   POTASSIUM mmol/L 4.5   CHLORIDE mmol/L 94*   CO2 mmol/L 26.0   BUN mg/dL 15   CREATININE mg/dL 0.93   GLUCOSE mg/dL 107*   CALCIUM mg/dL 9.0   AST (SGOT) U/L 23   ALT (SGPT) U/L 24     Results from last 7 days   Lab Units 08/15/21  2335 08/15/21  1634   TROPONIN T ng/mL 0.043* 0.030     Results from last 7 days   Lab Units 08/15/21  1634   WBC 10*3/mm3 6.00   HEMOGLOBIN g/dL 13.0   HEMATOCRIT % 37.4*   PLATELETS 10*3/mm3 154                   Invalid input(s): LDLCALC            Recent Radiology:  Imaging Results (Most Recent)     Procedure Component Value Units Date/Time    XR Chest 1 View [[id number]] Collected: 08/15/21 1708     Updated: 08/15/21 1712    Narrative:      Examination: XR CHEST 1 VW-     Date of Exam: 8/15/2021 4:57 PM     Indication: Chest pain.       Comparison: None available.     Technique: 1 view of the chest      FINDINGS:  The heart size is within normal. Mediastinal and hilar contours are  unremarkable. There is ill-defined opacity in the lateral aspect of the  left midlung. Right lung is clear. No pleural effusion or pneumothorax.  No bone lesion is seen.             Impression:      1. There is an ill-defined opacity in the lateral aspect of the left  midlung. This could be due to pneumonia. Chronic change or pleural  opacity could cause a similar appearance. There are no  priors  available.     Electronically Signed By-Rafaela Beltran MD On:8/15/2021 5:10 PM  This report was finalized on [id number] by  Rafaela Beltran MD.            ECHOCARDIOGRAM:    Results for orders placed during the hospital encounter of 07/08/21    Adult Transthoracic Echo Complete w/ Color, Spectral and Contrast if Necessary Per Protocol    Interpretation Summary  · Mild dilation of the aortic root is present.  · Estimated left ventricular EF = 45% Estimated left ventricular EF was in disagreement with the calculated left ventricular EF.  Left ventricular ejection fraction appears to be 41 - 45%. Left ventricular systolic function is low normal.  · Left ventricular diastolic function is consistent with (grade Ia w/high LAP) impaired relaxation.  · Estimated right ventricular systolic pressure from tricuspid regurgitation is normal (<35 mmHg).    Overall EF 45%  Regional abnormalities as identified consistent with LAD injury, apex, anteroapical, inferoapical, mid apical septum all severely hypokinetic, other segments appear to contract normally  RV size and function normal  Atrial sizes grossly normal  Grade 1 diastolic dysfunction by mitral inflow pattern  No masses or effusion seen  Right atrial pressure estimated 5-8  Trace to mild MR, trace to mild TR no valvular stenoses seen  Structurally normal aortic valve in limited views, no regurgitation or stenosis by Doppler  Pulmonic valve not well seen no stenosis by Doppler  No Doppler evidence of ASD or PFO                Assessment:         Active Hospital Problems    Diagnosis  POA   • **Acute coronary syndrome (CMS/HCC) [I24.9]  Yes   • Chest pain [R07.9]  Yes     1. Chest Pain, mild troponin elevation  - troponin 0.03, 0.04  - ECG reviewed by Dr. 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 CAD   - 7/2021: s/p anterior STEMI, Xience drug-eluting stent placement to the mid to proximal LAD  - post procedurally either had thromboembolic event to the apical LAD versus microvascular occlusion at the apex, he was ultimately taken back to the Cath  Lab a second time with ST elevations and Q waves throughout the inferior leads with large wraparound LAD with no reflow.  Balloon was passed except there was no reflow on second procedure.  Revascularization the small caliber wraparound LAD was abandoned   - continue Effient and Eliquis    3. Paroxysmal atrial fibrillation  - on amiodarone and Eliquis for anticoagulation    4. Chronic systolic and diastolic heart failure  - ECHO 7/9/2021 showed LVEF 41-45%, grade 1 DD  - aldactone, metoprolol and lisinopril     5. COVID 19 infection this admission       Plan:   Continue Effient, has already completed 30 days of triple therapy, no ASA  Continue Eliquis for anticoagulation  On amiodarone and BB, SR currently  Continue GDMT, changed to metoprolol XL  Stop lisinopril and start on  Entresto low-dose twice daily    No plan for repeat ischemic evaluation  EKG changes are old and improved over prior  No acute MI going on      Discussed with wife in detail as well      Hermes JONES PhD           Tari Mitchell, APRN  08/16/21  10:55 EDT

## 2021-08-16 NOTE — CASE MANAGEMENT/SOCIAL WORK
Discharge Planning Assessment   [place]     Patient Name: Matheus Jolly  MRN: [id number]  Today's Date: 8/16/2021    Admit Date: 8/15/2021    Discharge Needs Assessment     Row Name 08/16/21 1301       Living Environment    Lives With  spouse    Current Living Arrangements  home/apartment/condo    Primary Care Provided by  self    Provides Primary Care For  no one    Family Caregiver if Needed  spouse    Quality of Family Relationships  helpful    Able to Return to Prior Arrangements  yes       Resource/Environmental Concerns    Resource/Environmental Concerns  none    Transportation Concerns  car, none       Transition Planning    Patient/Family Anticipates Transition to  home with family    Patient/Family Anticipated Services at Transition  none    Transportation Anticipated  family or friend will provide       Discharge Needs Assessment    Readmission Within the Last 30 Days  other (see comments) recent admit 7/8-7/14    Equipment Currently Used at Home  none    Concerns to be Addressed  no discharge needs identified;denies needs/concerns at this time    Anticipated Changes Related to Illness  none    Equipment Needed After Discharge  none    Provided Post Acute Provider List?  Refused        Discharge Plan     Row Name 08/16/21 1302       Plan    Plan  Anticipate routine home    Patient/Family in Agreement with Plan  yes    Plan Comments  Called patients room due to covid precautions, spouse answer. Report they live at home together. patient is I with ADLs, still drives. Spouse to transport home on d/c. PCP and pharmacy confirmed. No issues affording food or medications. Currently denies any d/c needs or concerns.          Expected Discharge Date and Time     Expected Discharge Date Expected Discharge Time    Aug 16, 2021         Demographic Summary     Row Name 08/16/21 1300       General Information    Admission Type  observation    Arrived From  emergency department    Referral Source  admission list     Reason for Consult  discharge planning    Preferred Language  English        Functional Status     Row Name 08/16/21 1301       Functional Status    Usual Activity Tolerance  moderate    Current Activity Tolerance  moderate       Functional Status, IADL    Medications  independent    Meal Preparation  independent    Housekeeping  independent    Laundry  independent    Shopping  independent        Phone communication or documentation only - no physical contact with patient or family.          Sejal Jeffery RN

## 2021-08-16 NOTE — DISCHARGE SUMMARY
[facility] Medicine Services  DISCHARGE SUMMARY    Patient Name: Matheus Jolly  : 1945  MRN: [id number]    Date of Admission: 8/15/2021  Date of Discharge:  2021  Primary Care Physician: Ho Ro Jr., MD      Presenting Problem:   Chest pain [R07.9]    Active and Resolved Hospital Problems:  Active Hospital Problems    Diagnosis POA   • **Acute coronary syndrome (CMS/HCC) [I24.9] Yes   • Chest pain [R07.9] Yes      Resolved Hospital Problems   No resolved problems to display.     Chest pain-in context with patient with established cardiovascular disease and recent PCI with stenting, evaluated by cardiology and felt there is no new ischemia recommending medical management  -On beta-blockers  -As needed nitro  -Outpatient cardiology follow-up  -Resume maintenance medication  -ACE inhibitor held review days while patient borderline hypotensive    COVID-19-no definitive signs of underlying pneumonia patient on room air and reports some myalgias but otherwise doing well  -Not meeting criteria for steroids or remdesivir  -Continue quarantine at home  -Follow-up with PCP    Hypotension-patient systolics in the 90s 100s patient reports generally very hypertensive  -Small gentle IV fluid bolus  -Given 2 to 3 hours of IV fluids at 50 mL's per hour with improvement in blood pressure  -Hold ACE inhibitor for next 4872 hours and check blood pressure at home twice a day to ensure improving and then resume home medication    Atrial fibrillation-patient rate controlled  -Continue anticoagulation  -Continue amiodarone, metoprolol added per cardiology    Heart failure-patient with no signs of fluid retention  -Monitor volume status    Hypertension/hyperlipidemia/hearing loss-chronic in nature  -Resume home medication as clinically appropriate    Hospital Course     Hospital Course:  Matheus Jolly is a 76 y.o. male who developed STEMI on 2021, had a stent placed in LAD by   "[facility], placed on aspirin and Effient, developed new onset A. fib on 7/11, started on amiodarone and Eliquis, A. fib converted to SR, discharged on 7/14 with addition of lisinopril and Aldactone for his combined CHF.  Patient states that Dr. Mirza to come off aspirin 5 days ago because he no longer needs it.  Today around 2:30 PM and while he was walking back home from the mailbox and going up the walkway he started having heaviness and pressure in the chest that went on for several hours, took 3 baby aspirins, called EMS and given another baby aspirin and brought him to the ED by the time his discomfort was gone.  Denies having neck, arm, or jaw pain or shortness of breath, dizziness, sweating, nausea vomiting or any other complaint.     Emergency department course:  Afebrile, hemodynamically stable, no acute distress.  Physical examination per ED physician was unremarkable.  First troponin level was normal.  Chest x-ray reported \"There is an ill-defined opacity in the lateral aspect of the left  midlung. This could be due to pneumonia. Chronic change or pleural  opacity could cause a similar appearance \".  EKG showed sinus rhythm with some mild ST elevation in the lateral leads that is much improved compared to his previous EKG.  ED physician sent Dr. Mirza EKG and talk to him on the phone.  Dr. Mirza stated that he would not to treat this as acute MI, to watch him overnight and trend troponin level and he will see him in the morning.    On the hospital floor patient on room air required no supplemental oxygen.  Patient evaluated by his cardiologist started on beta-blocker.  Patient with mild hypotension given gentle IV fluids with improvement in pressures.  Patient cleared by cardiology for discharge and given no oxygen requirements or signs of worsening symptoms patient able to be discharged to continue Covid quarantine at home, patient's wife in the room advised her as well.  Patient able to be discharged home " in good condition with strict return precautions given follow-up organized with PCP and cardiologist.        DISCHARGE Follow Up Recommendations for labs and diagnostics:     Outpatient follow-up with cardiology      Reasons For Change In Medications and Indications for New Medications:    Toprol XL 25 mg daily    Day of Discharge     Vital Signs:  Temp:  [98.5 °F (36.9 °C)-99.7 °F (37.6 °C)] 98.5 °F (36.9 °C)  Heart Rate:  [66-88] 82  Resp:  [15-20] 20  BP: ()/(53-83) 119/67    Physical Exam:  Physical Exam    General: Elderly male sitting up in bed breathing company on room air no acute distress  HEENT: NC/AT, EOMI, mucosa moist  Heart: Regular, rate controlled  Chest: CTAB, no w/r/r, normal respiratory effort  Abdominal: Soft. NT/ND.   Musculoskeletal: Normal ROM.  No edema. No calf tenderness.  Neurological: AAOx3, no focal deficits  Skin: Skin is warm and dry. No rash  Psychiatric: Normal mood and affect.      Pertinent  and/or Most Recent Results     LAB RESULTS:      Lab 08/15/21  1634   WBC 6.00   HEMOGLOBIN 13.0   HEMATOCRIT 37.4*   PLATELETS 154   NEUTROS ABS 4.32   EOS ABS 0.24   MCV 89.5         Lab 08/15/21  1634   SODIUM 129*   POTASSIUM 4.5   CHLORIDE 94*   CO2 26.0   ANION GAP 9.0   BUN 15   CREATININE 0.93   GLUCOSE 107*   CALCIUM 9.0         Lab 08/15/21  1634   TOTAL PROTEIN 7.7   ALBUMIN 4.40   GLOBULIN 3.3   ALT (SGPT) 24   AST (SGOT) 23   BILIRUBIN 0.7   ALK PHOS 79         Lab 08/15/21  2335 08/15/21  1634   TROPONIN T 0.043* 0.030                 Brief Urine Lab Results     None        Microbiology Results (last 10 days)     Procedure Component Value - Date/Time    COVID PRE-OP / PRE-PROCEDURE SCREENING ORDER (NO ISOLATION) - Swab, Nasopharynx [[id number]]  (Abnormal) Collected: 08/15/21 1938    Lab Status: Final result Specimen: Swab from Nasopharynx Updated: 08/15/21 2102    Narrative:      The following orders were created for panel order COVID PRE-OP / PRE-PROCEDURE SCREENING ORDER  (NO ISOLATION) - Swab, Nasopharynx.  Procedure                               Abnormality         Status                     ---------                               -----------         ------                     COVID-19,CEPHEID/MIKAEL/BD...[[id number]]  Abnormal            Final result                 Please view results for these tests on the individual orders.    COVID-19,CEPHEID/MIKAEL/BDMAX,COR/DEVIN/PAD/JIMMY IN-HOUSE(OR EMERGENT/ADD-ON),NP SWAB IN TRANSPORT MEDIA 3-4 HR TAT, RT-PCR - Swab, Nasopharynx [[id number]]  (Abnormal) Collected: 08/15/21 1938    Lab Status: Final result Specimen: Swab from Nasopharynx Updated: 08/15/21 2102     COVID19 Detected    Narrative:      Fact sheet for providers: https://www.fda.gov/media/704560/download     Fact sheet for patients: https://www.fda.gov/media/515405/download  Fact sheet for providers: https://www.fda.gov/media/431665/download     Fact sheet for patients: https://www.fda.gov/media/667016/download          XR Chest 1 View    Result Date: 8/15/2021  Impression: 1. There is an ill-defined opacity in the lateral aspect of the left midlung. This could be due to pneumonia. Chronic change or pleural opacity could cause a similar appearance. There are no  priors available.  Electronically Signed By-Rafaela Beltran MD On:8/15/2021 5:10 PM This report was finalized on [id number] by  Rafaela Beltran MD.              Results for orders placed during the hospital encounter of 07/08/21    Adult Transthoracic Echo Complete w/ Color, Spectral and Contrast if Necessary Per Protocol    Interpretation Summary  · Mild dilation of the aortic root is present.  · Estimated left ventricular EF = 45% Estimated left ventricular EF was in disagreement with the calculated left ventricular EF. Left ventricular ejection fraction appears to be 41 - 45%. Left ventricular systolic function is low normal.  · Left ventricular diastolic function is consistent with (grade Ia w/high LAP) impaired  relaxation.  · Estimated right ventricular systolic pressure from tricuspid regurgitation is normal (<35 mmHg).    Overall EF 45%  Regional abnormalities as identified consistent with LAD injury, apex, anteroapical, inferoapical, mid apical septum all severely hypokinetic, other segments appear to contract normally  RV size and function normal  Atrial sizes grossly normal  Grade 1 diastolic dysfunction by mitral inflow pattern  No masses or effusion seen  Right atrial pressure estimated 5-8  Trace to mild MR, trace to mild TR no valvular stenoses seen  Structurally normal aortic valve in limited views, no regurgitation or stenosis by Doppler  Pulmonic valve not well seen no stenosis by Doppler  No Doppler evidence of ASD or PFO      Labs Pending at Discharge:      Procedures Performed           Consults:   Consults     Date and Time Order Name Status Description    8/15/2021 10:51 PM Inpatient Cardiology Consult Completed     8/15/2021  6:12 PM Cardiology (on-call MD unless specified) Completed     8/15/2021  6:11 PM Hospitalist (on-call MD unless specified)              Discharge Details        Discharge Medications      New Medications      Instructions Start Date   metoprolol succinate XL 25 MG 24 hr tablet  Commonly known as: TOPROL-XL   25 mg, Oral, Daily         Changes to Medications      Instructions Start Date   prasugrel 10 MG tablet  Commonly known as: Effient  What changed:   · how much to take  · how to take this  · when to take this   Take 60mg by mouth on the first day and then take 10mg by mouth daily thereafter.         Continue These Medications      Instructions Start Date   amiodarone 200 MG tablet  Commonly known as: PACERONE   200 mg, Oral, Every 24 Hours Scheduled      atorvastatin 20 MG tablet  Commonly known as: LIPITOR   20 mg, Oral, Nightly      Eliquis 5 MG tablet tablet  Generic drug: apixaban   5 mg, Oral, Every 12 Hours Scheduled      multivitamin tablet tablet   Oral, Daily       spironolactone 25 MG tablet  Commonly known as: ALDACTONE   25 mg, Oral, Daily         Stop These Medications    lisinopril 20 MG tablet  Commonly known as: PRINIVIL,ZESTRIL            No Known Allergies      Discharge Disposition:  Home or Self Care    Diet:  Hospital:  Diet Order   Procedures   • Diet Cardiac; Healthy Heart         Discharge Activity:   Activity Instructions     Activity as Tolerated              CODE STATUS:  Code Status and Medical Interventions:   Ordered at: 08/15/21 2036     Code Status:    CPR     Medical Interventions (Level of Support Prior to Arrest):    Full         Future Appointments   Date Time Provider Department Center   9/29/2021  3:30 PM Shay Mirza MD [facility]   11/29/2021  2:15 PM Ho Ro Jr., MD [facility]       Additional Instructions for the Follow-ups that You Need to Schedule     Discharge Follow-up with PCP   As directed       Currently Documented PCP:    Ho Ro Jr., MD    PCP Phone Number:    [phone]     Follow Up Details: Please follow-up with your primary care doctor within a week         Discharge Follow-up with Specified Provider: Please follow-up with your heart doctor in 2 to 4 weeks to monitor your heart   As directed      To: Please follow-up with your heart doctor in 2 to 4 weeks to monitor your heart               Time spent on Discharge including face to face service:  25 minutes    This patient has been examined wearing appropriate Personal Protective Equipment and discussed with hospital infection control department. 08/16/21      Signature:  Electronically signed by Jose Rafael Carroll MD, 08/16/21, 3:31 PM EDT.

## 2021-08-16 NOTE — OUTREACH NOTE
Prep Survey      Responses   [facility] patient discharged from?  [facility]   Is LACE score < 7 ?  No   Emergency Room discharge w/ pulse ox?  No   Eligibility  TCM   Hospital  [facility]   Date of Admission  08/15/21   Date of Discharge  08/16/21   Discharge Disposition  Home or Self Care   Discharge diagnosis  chest pain, Hx stemi & stent on 7/8/21, CHF, asymptomatic COVID-19   Does the patient have one of the following disease processes/diagnoses(primary or secondary)?  Other   Does the patient have Home health ordered?  No   Is there a DME ordered?  No   Prep survey completed?  Yes          Valeria Moreno RN

## 2021-08-16 NOTE — CASE MANAGEMENT/SOCIAL WORK
Continued Stay Note  ISMAEL To     Patient Name: Matheus Jolly  MRN: [id number]  Today's Date: 8/16/2021    Admit Date: 8/15/2021    Discharge Plan     Row Name 08/16/21 1641       Plan    Final Discharge Disposition Code  01 - home or self-care    Final Note  Home            Sejal Jeffery RN

## 2021-08-16 NOTE — H&P
"    [facility] Medicine Services      Patient Name: Matheus Jolly  : 1945  MRN: [id number]  Primary Care Physician:  Ho Ro Jr., MD  Date of admission: 8/15/2021      Subjective      Chief Complaint: Chest pain    History of Present Illness:  76-year-old  male who developed STEMI on 2021, had a stent placed in LAD by Dr. Mirza, placed on aspirin and Effient, developed new onset A. fib on , started on amiodarone and Eliquis, A. fib converted to SR, discharged on  with addition of lisinopril and Aldactone for his combined CHF.  Patient states that Dr. Mirza to come off aspirin 5 days ago because he no longer needs it.  Today around 2:30 PM and while he was walking back home from the mailbox and going up the walkway he started having heaviness and pressure in the chest that went on for several hours, took 3 baby aspirins, called EMS and given another baby aspirin and brought him to the ED by the time his discomfort was gone.  Denies having neck, arm, or jaw pain or shortness of breath, dizziness, sweating, nausea vomiting or any other complaint.    Emergency department course:  Afebrile, hemodynamically stable, no acute distress.  Physical examination per ED physician was unremarkable.  First troponin level was normal.  Chest x-ray reported \"There is an ill-defined opacity in the lateral aspect of the left  midlung. This could be due to pneumonia. Chronic change or pleural  opacity could cause a similar appearance \".  EKG showed sinus rhythm with some mild ST elevation in the lateral leads that is much improved compared to his previous EKG.  ED physician sent Dr. Mirza EKG and talk to him on the phone.  Dr. Mirza stated that he would not to treat this as acute MI, to watch him overnight and trend troponin level and he will see him in the morning.    ROS   All systems reviewed and were negative except for chest pain.    Personal History     Past Medical History: "   Diagnosis Date   • Afib (CMS/[facility])    • CHF (congestive heart failure) (CMS/[facility])    • Coronary artery disease    • Hearing loss    • Hyperlipidemia    • Hypertension        Past Surgical History:   Procedure Laterality Date   • CARDIAC CATHETERIZATION N/A 7/8/2021    Procedure: Left Heart Cath;  Surgeon: Shay Mirza MD;  Location: [facility] CATH INVASIVE LOCATION;  Service: Cardiology;  Laterality: N/A;   • CARDIAC CATHETERIZATION N/A 7/8/2021    Procedure: Percutaneous Coronary Intervention;  Surgeon: Shay Mirza MD;  Location: [facility] CATH INVASIVE LOCATION;  Service: Cardiology;  Laterality: N/A;   • CARDIAC CATHETERIZATION N/A 7/9/2021    Procedure: Left Heart Cath;  Surgeon: Shay Mirza MD;  Location: [facility] CATH INVASIVE LOCATION;  Service: Cardiology;  Laterality: N/A;       Family History: family history includes Cancer in his mother and sister; Heart disease in his father; Hyperlipidemia in his brother; Hypertension in his brother. Otherwise pertinent FHx was reviewed and not pertinent to current issue.    Social History:  reports that he has never smoked. He has never used smokeless tobacco. He reports previous alcohol use. He reports previous drug use.    Home Medications:  Prior to Admission Medications     Prescriptions Last Dose Informant Patient Reported? Taking?    amiodarone (PACERONE) 200 MG tablet 8/15/2021  No Yes    Take 1 tablet by mouth Daily.    apixaban (ELIQUIS) 5 MG tablet tablet 8/15/2021  No Yes    Take 1 tablet by mouth Every 12 (Twelve) Hours. Indications: Atrial Fibrillation    atorvastatin (LIPITOR) 20 MG tablet 8/14/2021  No Yes    Take 1 tablet by mouth Every Night.    lisinopril (PRINIVIL,ZESTRIL) 20 MG tablet 8/15/2021  No Yes    Take 1 tablet by mouth Daily.    multivitamin (multivitamin) tablet tablet 8/15/2021  Yes Yes    Take  by mouth Daily.    prasugrel (Effient) 10 MG tablet 8/15/2021  No Yes    Take 60mg by mouth on the first day and  then take 10mg by mouth daily thereafter.    Patient taking differently:  Take 10 mg by mouth Daily. Take 60mg by mouth on the first day and then take 10mg by mouth daily thereafter.    spironolactone (ALDACTONE) 25 MG tablet 8/15/2021  No Yes    Take 1 tablet by mouth Daily.            Allergies:  No Known Allergies    Objective      Vitals:   Temp:  [99.7 °F (37.6 °C)] 99.7 °F (37.6 °C)  Heart Rate:  [71-88] 73  Resp:  [16] 16  BP: (104-151)/(53-68) 108/57    Physical Exam   General: WD, WN, alert and oriented x3, no acute distress.   Eyes:  Show anicteric sclerae, moist conjunctivae with no lig lag; PERRLA.  HENT:  Normocephalic, atraumatic, very hard of hearing, moist oral mucosa.  Neck: Supple, no bruit, no JVP, no thyroid or lymph node enlargement, trachea central,   Lungs:  Good air entry. Clear to auscultation.   Heart: RRR, no murmur or rub.   Abdomen:  Soft, not tender, not distended, no organomegaly, bowel sounds positive.   Extremities: No leg edema or joint swelling, no calf tenderness, normal range of movement, pedal pulses intact.   Skin: No rash, lesions, or ulcers.  Normal texture and turgor.  Neurology:  Grossly intact.   Psychiatric exam: Pleasant, cooperative, appropriate mood and affect, intact judgment and insight.      Result Review    Result Review:  I have personally reviewed the results from the time of this admission to 8/15/2021 20:36 EDT and agree with these findings:  [x]  Laboratory  []  Microbiology  [x]  Radiology  [x]  EKG/Telemetry   []  Cardiology/Vascular   []  Pathology  [x]  Old records  []  Other:  Most notable findings include: Nothing of significance.      Assessment/Plan        Active Hospital Problems:  Active Hospital Problems    Diagnosis    • **Acute coronary syndrome (CMS/HCC)    • Chest pain      Assessment:  1.  Chest pain-rule out ACS.    2.  Hx of anterolateral STEMI-s/p stent placement in LAD on 7/8/2021.  -On Brilinta.    3.  Paroxysmal A. fib on  7/11/2021-currently in sinus rhythm.  -On amiodarone and Eliquis.    4.  Chronic systolic/diastolic CHF-stable.  -On lisinopril and Aldactone.    5.  Hypertension-controlled.    6.  Hyperlipidemia.  -On statin.    7.  Hearing loss.    Plan:  -Observation with telemetry.  -Patient's cardiologist Dr. Mirza consulted.  -Trend troponin level.  EKG as needed.  -Daily aspirin.  Continue patient's home medications.  Nitroglycerin ointment every 12 hours as needed.    DVT prophylaxis:  No DVT prophylaxis order currently exists.    CODE STATUS:    Code Status: CPR  Medical Interventions (Level of Support Prior to Arrest): Full    Admission Status:  I believe this patient meets observation status.    I discussed the patient's findings and my recommendations with patient.    This patient has been examined wearing appropriate Personal Protective Equipment and discussed with hospital infection control department. 08/15/21      Signature: Electronically signed by Silvana Gutierrez MD, 08/15/21, 8:50 PM EDT.

## 2021-08-17 ENCOUNTER — TRANSITIONAL CARE MANAGEMENT TELEPHONE ENCOUNTER (OUTPATIENT)
Dept: CALL CENTER | Facility: HOSPITAL | Age: 76
End: 2021-08-17

## 2021-08-17 LAB — QT INTERVAL: 350 MS

## 2021-08-17 NOTE — OUTREACH NOTE
Call Center TCM Note      Responses   [facility] patient discharged from?  [facility]   Does the patient have one of the following disease processes/diagnoses(primary or secondary)?  Other   TCM attempt successful?  Yes   Call start time  1230   Call end time  1234   Discharge diagnosis  chest pain, Hx stemi & stent on 7/8/21, CHF, asymptomatic COVID-19   Is patient permission given to speak with other caregiver?  Yes   List who call center can speak with  spouse- Sophia   Person spoke with today (if not patient) and relationship  spouse- Sophia   Does the patient have all medications ordered at discharge?  Yes   Is the patient taking all medications as directed (includes completed medication regime)?  Yes   Does the patient have a primary care provider?   Yes   Does the patient have an appointment with their PCP within 7 days of discharge?  Greater than 7 days   Comments regarding PCP  scheduled hospital f/u with Dr. Ro for 8/27   What is preventing the patient from scheduling follow up appointments within 7 days of discharge?  -- [patient requested to be seen on this date]   Nursing Interventions  Verified appointment date/time/provider   Has the patient kept scheduled appointments due by today?  N/A   Has home health visited the patient within 72 hours of discharge?  N/A   Psychosocial issues?  No   Did the patient receive a copy of their discharge instructions?  Yes   Nursing interventions  Reviewed instructions with patient [with spouse]   What is the patient's perception of their health status since discharge?  Improving   Is the patient/caregiver able to teach back the hierarchy of who to call/visit for symptoms/problems? PCP, Specialist, Home health nurse, Urgent Care, ED, 911  Yes   TCM call completed?  Yes   Wrap up additional comments  Per wife, patient is doing well, no questions or concerns at this time, scheduled hospital f/u with Dr. Ro for 8/27.          Cari Villafana,  RN    8/17/2021, 12:34 EDT

## 2021-08-23 ENCOUNTER — TELEPHONE (OUTPATIENT)
Dept: FAMILY MEDICINE CLINIC | Facility: CLINIC | Age: 76
End: 2021-08-23

## 2021-08-23 ENCOUNTER — TELEPHONE (OUTPATIENT)
Dept: CARDIOLOGY | Facility: CLINIC | Age: 76
End: 2021-08-23

## 2021-08-23 RX ORDER — METOPROLOL SUCCINATE 25 MG/1
25 TABLET, EXTENDED RELEASE ORAL DAILY
Qty: 30 TABLET | Refills: 5 | Status: SHIPPED | OUTPATIENT
Start: 2021-08-23 | End: 2022-03-16 | Stop reason: SDUPTHER

## 2021-08-23 NOTE — TELEPHONE ENCOUNTER
Caller: SelbyMorena    Relationship: Emergency Contact    Best call back number: [phone]    What medication are you requesting: ANTIBIOTIC/ SOMETHING STRONGER THAN OVER THE COUNTER AS THEY ARE NOT WORKING FOR THE PATIENT.    What are your current symptoms: NO APPETITE, COUGH,   How long have you been experiencing symptoms: A FEW DAYS    Have you had these symptoms before:    [] Yes  [x] No    Have you been treated for these symptoms before:   [] Yes  [x] No    If a prescription is needed, what is your preferred pharmacy and phone number: [facility] [place]     Additional notes:

## 2021-08-23 NOTE — TELEPHONE ENCOUNTER
Patients [facility] calling  He was prescribed metoprolol last Sunday This is a medication you had taken him off of because it messed with his heart rhythm  His energy is down  She doesn't know if its the medication or because he has tested positive for COVID  If you send in a replacement please send to [facility]

## 2021-08-24 ENCOUNTER — READMISSION MANAGEMENT (OUTPATIENT)
Dept: CALL CENTER | Facility: HOSPITAL | Age: 76
End: 2021-08-24

## 2021-08-24 RX ORDER — BENZONATATE 200 MG/1
200 CAPSULE ORAL 3 TIMES DAILY PRN
Qty: 21 CAPSULE | Refills: 1 | Status: SHIPPED | OUTPATIENT
Start: 2021-08-24 | End: 2021-09-16

## 2021-08-24 NOTE — OUTREACH NOTE
Medical Week 2 Survey      Responses   [facility] patient discharged from?  [facility]   Does the patient have one of the following disease processes/diagnoses(primary or secondary)?  Other   Week 2 attempt successful?  Yes   Call start time  1535   Discharge diagnosis  chest pain, Hx stemi & stent on 7/8/21, CHF, asymptomatic COVID-19   Is patient permission given to speak with other caregiver?  Yes   List who call center can speak with  Spouse Spohia    Person spoke with today (if not patient) and relationship  Spouse Sophia    Meds reviewed with patient/caregiver?  Yes   Is the patient taking all medications as directed (includes completed medication regime)?  Yes   Comments regarding appointments  8/27/21 with Dr. Ro    Has the patient kept scheduled appointments due by today?  Yes   Psychosocial issues?  No   What is the patient's perception of their health status since discharge?  Improving [coughing still]   Week 2 Call Completed?  Yes          Alanna Nix RN

## 2021-08-27 ENCOUNTER — OFFICE VISIT (OUTPATIENT)
Dept: FAMILY MEDICINE CLINIC | Facility: CLINIC | Age: 76
End: 2021-08-27

## 2021-08-27 VITALS
DIASTOLIC BLOOD PRESSURE: 67 MMHG | SYSTOLIC BLOOD PRESSURE: 111 MMHG | HEART RATE: 74 BPM | WEIGHT: 180 LBS | HEIGHT: 68 IN | BODY MASS INDEX: 27.28 KG/M2 | TEMPERATURE: 96.9 F | OXYGEN SATURATION: 90 %

## 2021-08-27 DIAGNOSIS — U07.1 PNEUMONIA DUE TO COVID-19 VIRUS: ICD-10-CM

## 2021-08-27 DIAGNOSIS — U07.1 COVID-19 VIRUS INFECTION: Primary | ICD-10-CM

## 2021-08-27 DIAGNOSIS — J12.82 PNEUMONIA DUE TO COVID-19 VIRUS: ICD-10-CM

## 2021-08-27 PROBLEM — R07.9 CHEST PAIN: Status: RESOLVED | Noted: 2021-08-15 | Resolved: 2021-08-27

## 2021-08-27 PROBLEM — I24.9 ACUTE CORONARY SYNDROME: Status: RESOLVED | Noted: 2021-08-15 | Resolved: 2021-08-27

## 2021-08-27 LAB — QT INTERVAL: 382 MS

## 2021-08-27 PROCEDURE — 99213 OFFICE O/P EST LOW 20 MIN: CPT | Performed by: FAMILY MEDICINE

## 2021-08-27 RX ORDER — PROMETHAZINE HYDROCHLORIDE AND CODEINE PHOSPHATE 6.25; 1 MG/5ML; MG/5ML
5 SOLUTION ORAL EVERY 4 HOURS PRN
Qty: 120 ML | Refills: 1 | Status: SHIPPED | OUTPATIENT
Start: 2021-08-27 | End: 2021-09-29

## 2021-08-27 NOTE — PATIENT INSTRUCTIONS
Continue your current medications and treatment.    Use the cough medication as prescribed.    Follow up in the office in 6 weeks or sooner if needed.

## 2021-08-27 NOTE — PROGRESS NOTES
"Subjective   Matheus Jolly is a 76 y.o. male.     Chief Complaint   Patient presents with   • Hospital Follow Up Visit     [facility]  Chief complaint: Cough and weakness    The patient is a 76-year-old white male who is diagnosed with Covid 19 infection and pneumonia.  Patient treated symptomatically.  Patient states that he has persistent cough fatigue and weakness.  The cough is nonproductive.  Tessalon Perles are not helping.  Patient does have an oximeter at home.  His oximetry is stable.      The following portions of the patient's history were reviewed and updated as appropriate: allergies, current medications, past family history, past medical history, past social history, past surgical history and problem list.    Review of Systems    Objective     /67 (BP Location: Left arm, Patient Position: Sitting, Cuff Size: Adult)   Pulse 74   Temp 96.9 °F (36.1 °C) (Infrared)   Ht 172.7 cm (68\")   Wt 81.6 kg (180 lb)   SpO2 90%   BMI 27.37 kg/m²     Physical Exam  Vitals and nursing note reviewed.   Constitutional:       Appearance: He is well-developed and normal weight.      Comments: Chronically ill-appearing   HENT:      Head: Normocephalic and atraumatic.      Nose: Nose normal.      Mouth/Throat:      Mouth: Mucous membranes are moist.      Pharynx: Oropharynx is clear.   Eyes:      Extraocular Movements: Extraocular movements intact.      Conjunctiva/sclera: Conjunctivae normal.      Pupils: Pupils are equal, round, and reactive to light.   Cardiovascular:      Rate and Rhythm: Normal rate and regular rhythm.      Heart sounds: Normal heart sounds.   Pulmonary:      Effort: Pulmonary effort is normal.      Breath sounds: Examination of the right-middle field reveals decreased breath sounds. Examination of the left-middle field reveals decreased breath sounds. Examination of the right-lower field reveals decreased breath sounds. Examination of the left-lower field reveals decreased breath sounds. " Decreased breath sounds present.   Abdominal:      General: Abdomen is flat. Bowel sounds are normal.      Palpations: Abdomen is soft.   Musculoskeletal:         General: Normal range of motion.      Cervical back: Neck supple.   Skin:     General: Skin is warm and dry.   Neurological:      Mental Status: He is alert and oriented to person, place, and time.   Psychiatric:         Behavior: Behavior normal.         Thought Content: Thought content normal.         Judgment: Judgment normal.         XR Chest 1 View (08/15/2021 16:58)  Troponin (08/15/2021 23:35)  COVID PRE-OP / PRE-PROCEDURE SCREENING ORDER (NO ISOLATION) - Swab, Nasopharynx (08/15/2021 19:38)  Extra Tubes (08/15/2021 16:34)  Manual Differential (08/15/2021 16:34)  Troponin (08/15/2021 16:34)  Comprehensive Metabolic Panel (08/15/2021 16:34)  CBC & Differential (08/15/2021 16:34)  ECG 12 Lead (08/16/2021 06:02)    Assessment/Plan   Diagnoses and all orders for this visit:    1. COVID-19 virus infection (Primary)-the patient was advised there is nothing specific to do to hasten his recovery and asked get plenty of fluids.  He is to monitor his O2 sats.  He is to use the cough suppressant..  Further care depend results of studies and how he progresses.    2. Pneumonia due to COVID-19 virus      Patient Instructions   Continue your current medications and treatment.    Use the cough medication as prescribed.    Follow up in the office in 6 weeks or sooner if needed.          Ho Ro Jr., MD    08/27/21

## 2021-09-15 ENCOUNTER — TELEPHONE (OUTPATIENT)
Dept: FAMILY MEDICINE CLINIC | Facility: CLINIC | Age: 76
End: 2021-09-15

## 2021-09-15 DIAGNOSIS — E78.49 OTHER HYPERLIPIDEMIA: ICD-10-CM

## 2021-09-15 DIAGNOSIS — I10 BENIGN ESSENTIAL HYPERTENSION: Primary | ICD-10-CM

## 2021-09-15 NOTE — TELEPHONE ENCOUNTER
Caller: JOHNSON RODGERS    Relationship to patient: Emergency Contact    Best call back number:[phone] (M)    Patient is needing:PATIENT IS GETTING LABS DONE WITH DR. WOLF ON 9/29/21. WANTS TO KNOW IF HE CAN USE THOSE LABS FOR THE LABS HE NEEDS FOR DR. OCAMPO. PLEASE ADVISE. THANK YOU.

## 2021-09-16 ENCOUNTER — APPOINTMENT (OUTPATIENT)
Dept: CT IMAGING | Facility: HOSPITAL | Age: 76
End: 2021-09-16

## 2021-09-16 ENCOUNTER — APPOINTMENT (OUTPATIENT)
Dept: GENERAL RADIOLOGY | Facility: HOSPITAL | Age: 76
End: 2021-09-16

## 2021-09-16 ENCOUNTER — HOSPITAL ENCOUNTER (EMERGENCY)
Facility: HOSPITAL | Age: 76
Discharge: HOME | End: 2021-09-16
Attending: EMERGENCY MEDICINE | Admitting: EMERGENCY MEDICINE

## 2021-09-16 VITALS
WEIGHT: 163.58 LBS | OXYGEN SATURATION: 98 % | BODY MASS INDEX: 24.79 KG/M2 | HEIGHT: 68 IN | TEMPERATURE: 97.6 F | RESPIRATION RATE: 25 BRPM | SYSTOLIC BLOOD PRESSURE: 139 MMHG | DIASTOLIC BLOOD PRESSURE: 78 MMHG | HEART RATE: 74 BPM

## 2021-09-16 DIAGNOSIS — R07.89 CHEST WALL PAIN: Primary | ICD-10-CM

## 2021-09-16 LAB
ALBUMIN SERPL-MCNC: 3.7 G/DL (ref 3.5–5.2)
ALBUMIN/GLOB SERPL: 0.9 G/DL
ALP SERPL-CCNC: 102 U/L (ref 39–117)
ALT SERPL W P-5'-P-CCNC: 27 U/L (ref 1–41)
ANION GAP SERPL CALCULATED.3IONS-SCNC: 11 MMOL/L (ref 5–15)
AST SERPL-CCNC: 21 U/L (ref 1–40)
BASOPHILS # BLD AUTO: 0.1 10*3/MM3 (ref 0–0.2)
BASOPHILS NFR BLD AUTO: 0.6 % (ref 0–1.5)
BILIRUB SERPL-MCNC: 0.8 MG/DL (ref 0–1.2)
BUN SERPL-MCNC: 11 MG/DL (ref 8–23)
BUN/CREAT SERPL: 13.6 (ref 7–25)
CALCIUM SPEC-SCNC: 9 MG/DL (ref 8.6–10.5)
CHLORIDE SERPL-SCNC: 97 MMOL/L (ref 98–107)
CO2 SERPL-SCNC: 28 MMOL/L (ref 22–29)
CREAT SERPL-MCNC: 0.81 MG/DL (ref 0.76–1.27)
D DIMER PPP FEU-MCNC: 0.99 MG/L (FEU) (ref 0–0.59)
DEPRECATED RDW RBC AUTO: 52.1 FL (ref 37–54)
EOSINOPHIL # BLD AUTO: 0.3 10*3/MM3 (ref 0–0.4)
EOSINOPHIL NFR BLD AUTO: 3.4 % (ref 0.3–6.2)
ERYTHROCYTE [DISTWIDTH] IN BLOOD BY AUTOMATED COUNT: 17.1 % (ref 12.3–15.4)
GFR SERPL CREATININE-BSD FRML MDRD: 92 ML/MIN/1.73
GLOBULIN UR ELPH-MCNC: 3.9 GM/DL
GLUCOSE SERPL-MCNC: 154 MG/DL (ref 65–99)
HCT VFR BLD AUTO: 34.6 % (ref 37.5–51)
HGB BLD-MCNC: 12 G/DL (ref 13–17.7)
HOLD SPECIMEN: NORMAL
HOLD SPECIMEN: NORMAL
LYMPHOCYTES # BLD AUTO: 1 10*3/MM3 (ref 0.7–3.1)
LYMPHOCYTES NFR BLD AUTO: 11.2 % (ref 19.6–45.3)
MCH RBC QN AUTO: 30.7 PG (ref 26.6–33)
MCHC RBC AUTO-ENTMCNC: 34.8 G/DL (ref 31.5–35.7)
MCV RBC AUTO: 88.2 FL (ref 79–97)
MONOCYTES # BLD AUTO: 1.2 10*3/MM3 (ref 0.1–0.9)
MONOCYTES NFR BLD AUTO: 12.8 % (ref 5–12)
NEUTROPHILS NFR BLD AUTO: 6.5 10*3/MM3 (ref 1.7–7)
NEUTROPHILS NFR BLD AUTO: 72 % (ref 42.7–76)
NRBC BLD AUTO-RTO: 0 /100 WBC (ref 0–0.2)
PLATELET # BLD AUTO: 192 10*3/MM3 (ref 140–450)
PMV BLD AUTO: 8.2 FL (ref 6–12)
POTASSIUM SERPL-SCNC: 4.2 MMOL/L (ref 3.5–5.2)
PROT SERPL-MCNC: 7.6 G/DL (ref 6–8.5)
QT INTERVAL: 378 MS
RBC # BLD AUTO: 3.92 10*6/MM3 (ref 4.14–5.8)
SODIUM SERPL-SCNC: 136 MMOL/L (ref 136–145)
TROPONIN T SERPL-MCNC: 0.01 NG/ML (ref 0–0.03)
TROPONIN T SERPL-MCNC: 0.02 NG/ML (ref 0–0.03)
WBC # BLD AUTO: 9.1 10*3/MM3 (ref 3.4–10.8)
WHOLE BLOOD HOLD SPECIMEN: NORMAL

## 2021-09-16 PROCEDURE — 96366 THER/PROPH/DIAG IV INF ADDON: CPT

## 2021-09-16 PROCEDURE — 71045 X-RAY EXAM CHEST 1 VIEW: CPT

## 2021-09-16 PROCEDURE — 96365 THER/PROPH/DIAG IV INF INIT: CPT

## 2021-09-16 PROCEDURE — 93005 ELECTROCARDIOGRAM TRACING: CPT | Performed by: EMERGENCY MEDICINE

## 2021-09-16 PROCEDURE — 80053 COMPREHEN METABOLIC PANEL: CPT | Performed by: PHYSICIAN ASSISTANT

## 2021-09-16 PROCEDURE — 85379 FIBRIN DEGRADATION QUANT: CPT | Performed by: PHYSICIAN ASSISTANT

## 2021-09-16 PROCEDURE — 71275 CT ANGIOGRAPHY CHEST: CPT

## 2021-09-16 PROCEDURE — 0 IOPAMIDOL PER 1 ML: Performed by: EMERGENCY MEDICINE

## 2021-09-16 PROCEDURE — 93005 ELECTROCARDIOGRAM TRACING: CPT | Performed by: PHYSICIAN ASSISTANT

## 2021-09-16 PROCEDURE — 85025 COMPLETE CBC W/AUTO DIFF WBC: CPT | Performed by: PHYSICIAN ASSISTANT

## 2021-09-16 PROCEDURE — 84484 ASSAY OF TROPONIN QUANT: CPT | Performed by: PHYSICIAN ASSISTANT

## 2021-09-16 PROCEDURE — 99284 EMERGENCY DEPT VISIT MOD MDM: CPT

## 2021-09-16 RX ORDER — ASPIRIN 81 MG/1
324 TABLET, CHEWABLE ORAL ONCE
Status: COMPLETED | OUTPATIENT
Start: 2021-09-16 | End: 2021-09-16

## 2021-09-16 RX ORDER — SODIUM CHLORIDE 0.9 % (FLUSH) 0.9 %
10 SYRINGE (ML) INJECTION AS NEEDED
Status: DISCONTINUED | OUTPATIENT
Start: 2021-09-16 | End: 2021-09-16 | Stop reason: HOSPADM

## 2021-09-16 RX ORDER — TRAMADOL HYDROCHLORIDE 50 MG/1
50 TABLET ORAL EVERY 6 HOURS PRN
Qty: 12 TABLET | Refills: 0 | Status: SHIPPED | OUTPATIENT
Start: 2021-09-16 | End: 2022-11-30

## 2021-09-16 RX ORDER — ACETAMINOPHEN 325 MG/1
650 TABLET ORAL AS NEEDED
COMMUNITY

## 2021-09-16 RX ORDER — MULTIPLE VITAMINS W/ MINERALS TAB 9MG-400MCG
1 TAB ORAL EVERY OTHER DAY
COMMUNITY

## 2021-09-16 RX ORDER — LANOLIN ALCOHOL/MO/W.PET/CERES
1000 CREAM (GRAM) TOPICAL EVERY OTHER DAY
COMMUNITY

## 2021-09-16 RX ORDER — NITROGLYCERIN 20 MG/100ML
10-50 INJECTION INTRAVENOUS
Status: DISCONTINUED | OUTPATIENT
Start: 2021-09-16 | End: 2021-09-16 | Stop reason: HOSPADM

## 2021-09-16 RX ADMIN — ASPIRIN 81 MG CHEWABLE TABLET 324 MG: 81 TABLET CHEWABLE at 06:45

## 2021-09-16 RX ADMIN — NITROGLYCERIN 5 MCG/MIN: 20 INJECTION INTRAVENOUS at 06:50

## 2021-09-16 RX ADMIN — IOPAMIDOL 100 ML: 755 INJECTION, SOLUTION INTRAVENOUS at 07:51

## 2021-09-16 NOTE — ED PROVIDER NOTES
Subjective   Patient is a 76-year-old male [facility] significant for A. fib, CHF, CAD, hypertension, and hyperlipidemia who presents with complaints of left-sided chest pain that started around midnight last night.  Patient states the pain is worse with taking deep breaths better with rest.  He denies any significant change in his chest pain with exertion.  He describes it as a sharp type pain that he currently rates a 9/10 in severity.  He denies any associated shortness of breath.  He reports an intermittent cough that also makes the chest pain worse but states his cough is normal for him with no significant change.  States is nonproductive without hemoptysis.  Patient denies any lightheadedness, dizziness, headache, fever, nausea, vomiting, abdominal pain, black or bloody stools.  Patient states he did have a heart attack back in June and did have stent placed at that time.  Patient states he has been taking his medication as directed including his Eliquis and Brilinta.  Patient states he did take 2 sublingual nitroglycerin around 1245 and 1250 this morning which did seem to help. He also took Tylenol at 4 AM as the pain had returned.  He denies any lower extremity edema recent travel or known sick contacts.  Patient states that this pain feels different than his prior MI.  Patient's family at bedside also reports he was hospitalized about a month ago with complaints of chest pain which she states also feels different from today.  At that time patient was found to have COVID-19.  Patient's family reports he had no symptoms and did quarantine for 2 weeks.       History provided by:  Relative and patient      Review of Systems   Constitutional: Negative.    HENT: Negative.    Eyes: Negative for photophobia and visual disturbance.   Respiratory: Negative.    Cardiovascular: Positive for chest pain. Negative for palpitations and leg swelling.   Gastrointestinal: Negative for abdominal distention, abdominal pain, blood in  stool, nausea and vomiting.   Genitourinary: Negative for dysuria, flank pain, frequency and hematuria.   Musculoskeletal: Negative for neck pain and neck stiffness.   Skin: Negative.    Neurological: Negative.    Hematological: Negative.        Past Medical History:   Diagnosis Date   • Afib (CMS/HCC)    • CHF (congestive heart failure) (CMS/HCC)    • Coronary artery disease    • COVID-19    • Hearing loss    • Hyperlipidemia    • Hypertension        No Known Allergies    Past Surgical History:   Procedure Laterality Date   • CARDIAC CATHETERIZATION N/A 7/8/2021    Procedure: Left Heart Cath;  Surgeon: Shay Mirza MD;  Location: [facility] CATH INVASIVE LOCATION;  Service: Cardiology;  Laterality: N/A;   • CARDIAC CATHETERIZATION N/A 7/8/2021    Procedure: Percutaneous Coronary Intervention;  Surgeon: Shay Mirza MD;  Location: [facility] CATH INVASIVE LOCATION;  Service: Cardiology;  Laterality: N/A;   • CARDIAC CATHETERIZATION N/A 7/9/2021    Procedure: Left Heart Cath;  Surgeon: Shay Mirza MD;  Location: [facility] CATH INVASIVE LOCATION;  Service: Cardiology;  Laterality: N/A;       Family History   Problem Relation Age of Onset   • Cancer Mother    • Heart disease Father    • Cancer Sister    • Hypertension Brother    • Hyperlipidemia Brother        Social History     Socioeconomic History   • Marital status:      Spouse name: Not on file   • Number of children: Not on file   • Years of education: Not on file   • Highest education level: Not on file   Tobacco Use   • Smoking status: Never Smoker   • Smokeless tobacco: Never Used   Vaping Use   • Vaping Use: Never used   Substance and Sexual Activity   • Alcohol use: Not Currently   • Drug use: Not Currently   • Sexual activity: Defer           Objective   Physical Exam  Vitals and nursing note reviewed.   Constitutional:       General: He is not in acute distress.     Appearance: He is well-developed. He is not ill-appearing,  "toxic-appearing or diaphoretic.   HENT:      Head: Normocephalic and atraumatic.      Mouth/Throat:      Mouth: Mucous membranes are moist.      Pharynx: Oropharynx is clear.   Eyes:      Extraocular Movements: Extraocular movements intact.      Pupils: Pupils are equal, round, and reactive to light.   Cardiovascular:      Rate and Rhythm: Normal rate and regular rhythm.      Heart sounds: No murmur heard.   No friction rub. No gallop.    Pulmonary:      Effort: Pulmonary effort is normal. No tachypnea or accessory muscle usage.      Breath sounds: No decreased breath sounds, wheezing, rhonchi or rales.   Chest:      Chest wall: No mass, deformity, tenderness or crepitus.       Abdominal:      General: Bowel sounds are normal. There is no distension.      Palpations: Abdomen is soft. There is no mass.      Tenderness: There is no abdominal tenderness. There is no guarding or rebound.   Musculoskeletal:      Cervical back: Normal range of motion and neck supple.      Right lower leg: No edema.      Left lower leg: No edema.   Skin:     General: Skin is warm.      Capillary Refill: Capillary refill takes less than 2 seconds.      Findings: No rash.   Neurological:      Mental Status: He is alert and oriented to person, place, and time.   Psychiatric:         Mood and Affect: Mood normal.         Behavior: Behavior normal.         Procedures           ED Course  ED Course as of Sep 16 1014   Thu Sep 16, 2021   0856 Waiting to hear back from Dr Mirza    [AA]      ED Course User Index  [AA] Jakob Day PA    /78   Pulse 74   Temp 97.6 °F (36.4 °C) (Oral)   Resp 25   Ht 172.7 cm (68\")   Wt 74.2 kg (163 lb 9.3 oz)   SpO2 98%   BMI 24.87 kg/m²   Medications   sodium chloride 0.9 % flush 10 mL (has no administration in time range)   nitroglycerin (TRIDIL) 200 mcg/ml infusion (0 mcg/min Intravenous Stopped 9/16/21 0933)   aspirin chewable tablet 324 mg (324 mg Oral Given 9/16/21 0645)   iopamidol " (ISOVUE-370) 76 % injection 100 mL (100 mL Intravenous Given 9/16/21 0755)     Labs Reviewed   COMPREHENSIVE METABOLIC PANEL - Abnormal; Notable for the following components:       Result Value    Glucose 154 (*)     Chloride 97 (*)     All other components within normal limits    Narrative:     GFR Normal >60  Chronic Kidney Disease <60  Kidney Failure <15     D-DIMER, QUANTITATIVE - Abnormal; Notable for the following components:    D-Dimer, Quantitative 0.99 (*)     All other components within normal limits    Narrative:     Reference Range  --------------------------------------------------------------------     < 0.50   Negative Predictive Value  0.50-0.59   Indeterminate    >= 0.60   Probable VTE             A very low percentage of patients with DVT may yield D-Dimer results   below the cut-off of 0.50 mg/L FEU.  This is known to be more   prevalent in patients with distal DVT.             Results of this test should always be interpreted in conjunction with   the patient's medical history, clinical presentation and other   findings.  Clinical diagnosis should not be based on the result of   INNOVANCE D-Dimer alone.   CBC WITH AUTO DIFFERENTIAL - Abnormal; Notable for the following components:    RBC 3.92 (*)     Hemoglobin 12.0 (*)     Hematocrit 34.6 (*)     RDW 17.1 (*)     Lymphocyte % 11.2 (*)     Monocyte % 12.8 (*)     Monocytes, Absolute 1.20 (*)     All other components within normal limits   TROPONIN (IN-HOUSE) - Normal    Narrative:     Troponin T Reference Range:  <= 0.03 ng/mL-   Negative for AMI  >0.03 ng/mL-     Abnormal for myocardial necrosis.  Clinicians would have to utilize clinical acumen, EKG, Troponin and serial changes to determine if it is an Acute Myocardial Infarction or myocardial injury due to an underlying chronic condition.       Results may be falsely decreased if patient taking Biotin.     TROPONIN (IN-HOUSE) - Normal    Narrative:     Troponin T Reference Range:  <= 0.03  ng/mL-   Negative for AMI  >0.03 ng/mL-     Abnormal for myocardial necrosis.  Clinicians would have to utilize clinical acumen, EKG, Troponin and serial changes to determine if it is an Acute Myocardial Infarction or myocardial injury due to an underlying chronic condition.       Results may be falsely decreased if patient taking Biotin.     RAINBOW DRAW    Narrative:     The following orders were created for panel order [place] Draw.  Procedure                               Abnormality         Status                     ---------                               -----------         ------                     Green Top (Gel)[[id number]]                                  Final result               Lavender Top[[id number]]                                     Final result               Gold Top - SST[[id number]]                                   Final result               Light Blue Top[[id number]]                                   Final result                 Please view results for these tests on the individual orders.   GREEN TOP   LAVENDER TOP   GOLD TOP - SST   LIGHT BLUE TOP   CBC AND DIFFERENTIAL    Narrative:     The following orders were created for panel order CBC & Differential.  Procedure                               Abnormality         Status                     ---------                               -----------         ------                     CBC Auto Differential[[id number]]        Abnormal            Final result                 Please view results for these tests on the individual orders.     XR Chest 1 View    Result Date: 9/16/2021   1. Left greater than right multifocal airspace opacity, concerning for multifocal pneumonia, including Covid 19 pneumonia.  Electronically Signed By-Efrem Rodriguez MD On:9/16/2021 7:57 AM This report was finalized on [id number] by  Efrem Rodriguez MD.    CT Chest Pulmonary Embolism    Result Date: 9/16/2021  No findings of pulmonary embolus. Multifocal airspace opacity  consistent multifocal pneumonia. COVID 19 pneumonia most likely. Small pleural effusions indicated could be a component of pulmonary edema. Cardiomegaly.    Electronically Signed By-Kenia Santizo MD On:9/16/2021 8:02 AM This report was finalized on [id number] by  Kenia Santizo MD.                                           MDM  Number of Diagnoses or Management Options  Diagnosis management comments: Chart Review:  Comorbidity: As per past medical history  ECG: Interpreted by myself and Dr. Brizuela shows sinus rhythm rate 78 prolonged TN interval probable left ventricular hypertrophy, old inferior infarct.   Labs: CBC shows WBC 9.1 hemoglobin 12 hematocrit 34.6 platelets 192.  CMP shows glucose 154 BUN 11 creatinine 0.81 sodium 136 potassium 4.2 troponin 0 0.013.  D-dimer elevated 0.99  Imaging: Was interpreted by physician and reviewed by myself:  XR Chest 1 View  Result Date: 9/16/2021   1. Left greater than right multifocal airspace opacity, concerning for multifocal pneumonia, including Covid 19 pneumonia.  Electronically Signed By-Efrem Rodriguez MD On:9/16/2021 7:57 AM This report was finalized on [id number] by  Efrem Rodriguez MD.    CT Chest Pulmonary Embolism  Result Date: 9/16/2021  No findings of pulmonary embolus. Multifocal airspace opacity consistent multifocal pneumonia. COVID 19 pneumonia most likely. Small pleural effusions indicated could be a component of pulmonary edema. Cardiomegaly.    Electronically Signed By-Kenia Santizo MD On:9/16/2021 8:02 AM This report was finalized on [id number] by  Kenia Santizo MD.      Disposition/Treatment:  Appropriate PPE was worn during exam and throughout all encounters with the patient.  When the ED IV was placed and labs were obtained.  EKG showed no signs of acute STEMI as above.  Initial troponin within normal limits 0.013 which has improved from prior troponins 1 month ago.  CBC and CMP were fairly unremarkable.  D-dimer was found to be elevated as above  therefore CT PE protocol was ordered which showed no acute PE did show multifocal airspace opacities consistent with multifocal pneumonia patient was diagnosed with COVID-19 1 month ago at his prior admission to the ED patient has been fairly asymptomatic throughout this time.  Patient's chest pain today is reproducible states it does feel different than when he had his MI 2 months ago patient chest pain.        Amount and/or Complexity of Data Reviewed  Clinical lab tests: reviewed  Tests in the radiology section of CPT®: reviewed  Tests in the medicine section of CPT®: reviewed        Final diagnoses:   Chest wall pain       ED Disposition  ED Disposition     ED Disposition Condition Comment    Discharge Stable           Ho Ro Jr., MD  [address] [facility] [address]  [place] [id number] [phone]    Schedule an appointment as soon as possible for a visit in 3 days      [facility] EMERGENCY DEPARTMENT  [address] [place] [id number] [phone]  Go to   If symptoms worsen    Shay Mirza MD  [address]  [address]  [place] [address] [phone]    Schedule an appointment as soon as possible for a visit            Medication List      New Prescriptions    traMADol 50 MG tablet  Commonly known as: ULTRAM  Take 1 tablet by mouth Every 6 (Six) Hours As Needed for Moderate Pain .        Changed    prasugrel 10 MG tablet  Commonly known as: Effient  Take 60mg by mouth on the first day and then take 10mg by mouth daily thereafter.  What changed:   · how much to take  · how to take this  · when to take this           Where to Get Your Medications      These medications were sent to [facility] [address] [place] [address]    Hours: Mon-Fri 7:00AM-7:00PM Phone: [phone]   · traMADol 50 MG tablet          Jakob Day PA  09/16/21 7519

## 2021-09-16 NOTE — DISCHARGE INSTRUCTIONS
Continue medications as previously directed.    Follow-up with cardiology as listed below for further evaluation and management if your pain continues.    Follow-up with your primary care provider in 3-5 days.  If you do not have a primary care provider call [phone] for help in finding one, or you may follow up with [facility] at [phone].    Return to ED for any new or worsening symptoms.    Take tramadol as needed for pain.  Do not operate heavy machinery or drink alcohol while taking this medication.

## 2021-09-22 ENCOUNTER — TELEPHONE (OUTPATIENT)
Dept: CARDIOLOGY | Facility: CLINIC | Age: 76
End: 2021-09-22

## 2021-09-29 ENCOUNTER — OFFICE VISIT (OUTPATIENT)
Dept: CARDIOLOGY | Facility: CLINIC | Age: 76
End: 2021-09-29

## 2021-09-29 VITALS
DIASTOLIC BLOOD PRESSURE: 70 MMHG | HEART RATE: 82 BPM | WEIGHT: 165.8 LBS | BODY MASS INDEX: 25.13 KG/M2 | SYSTOLIC BLOOD PRESSURE: 158 MMHG | RESPIRATION RATE: 18 BRPM | HEIGHT: 68 IN

## 2021-09-29 DIAGNOSIS — I10 BENIGN ESSENTIAL HYPERTENSION: Primary | ICD-10-CM

## 2021-09-29 PROCEDURE — 99214 OFFICE O/P EST MOD 30 MIN: CPT | Performed by: INTERNAL MEDICINE

## 2021-09-29 RX ORDER — LOSARTAN POTASSIUM 25 MG/1
25 TABLET ORAL DAILY
Qty: 30 TABLET | Refills: 5 | Status: SHIPPED | OUTPATIENT
Start: 2021-09-29 | End: 2021-12-22 | Stop reason: DRUGHIGH

## 2021-09-29 NOTE — PROGRESS NOTES
Cardiology clinic note  Shay Mirza MD, PhD  Subjective:     Encounter Date:09/29/2021      Patient ID: Matheus Jolly is a 76 y.o. male.    Chief Complaint:  Chief Complaint   Patient presents with   • Follow-up       HPI:  History of Present Illness  At the pleasure to see this [age]-year-old gentleman in clinic today after hospitalization with anterior STEMI, Xience drug-eluting stent placement to the mid to proximal LAD, post procedurally either had thromboembolic event to the apical LAD versus microvascular occlusion at the apex, he was ultimately taken back to the Cath Lab a second time with ST elevations and Q waves throughout the inferior leads with large wraparound LAD with no reflow.  Balloon was passed except there was no reflow on second procedure.  Revascularization the small caliber wraparound LAD was abandoned ultimately his EF was 40%.  He had no cardiac arrhythmias, there was preservation of the basal anterior, mid anterolateral and all other segments other than the apex and apical inferior wall which were akinetic.  He had some periprocedural and hospitalization atrial fibrillation was placed on Eliquis for anticoagulation.  He remains on aspirin ticagrelor and Eliquis.  There is no bleeding.  He denies any anginal chest pain, he is on statin therapy and no beta-blocker secondary to prior bradycardia heart rate 60s presently.  He is otherwise doing well with no peripheral edema, NYHA class II shortness of air, CCS class I.  He is well compensated and on goal-directed medical therapy    Presents back otherwise doing well with no heart failure signs or symptoms, he had recent Covid pneumonia on top of his coronary disease exacerbating his chest pain likely with demand ischemia with occluded apical LAD despite antiplatelet therapy.  His EF is around 40% chronically, as he is NYHA class II-III with respect to dyspnea on exertion but no swelling peripherally.  He is compliant with all medicines and no  other complaints.  No volume overload.  He has no energy he says after having Covid.     Blood pressure today is 130 systolic with heart rates in the 60s     Review of systems negative x14 point review of systems except was mentioned above     Historical data copied forward from previous encounters in EMR is unchanged     Assessment and plan per my encounter today  CAD status post anterior STEMI EF 40%  Ischemic or myopathy  Systolic heart failure EF 40%, presently compensated  Essential hypertension  Paroxysmal atrial fibrillation     Plan today  Prasugrel and Effient on board continue  Atorvastatin 20, optimally should be on 40 will evaluate for increase to high-dose therapy  No beta-blocker secondary to prior bradycardia despite cardiomyopathy, heart rate 70s  Continue amiodarone for prophylaxis of atrial fibrillation which was difficult to control in the hospital  Hypertension, cardiomyopathy, start losartan 25 daily          Return to clinic in 3 months for further titration of his goal-directed medical therapy  No indication for ICD placement with EF greater than 35%, no secondary ventricular arrhythmias, well compensated presently     Further conditions follow findings clinical course and response to goal-directed medical therapy     It is a pleasure to be involved in his cardiovascular care.  Please call with any questions or concerns  Shay Mirza MD, PhD  The following portions of the patient's history were reviewed and updated as appropriate: allergies, current medications, past family history, past medical history, past social history, past surgical history and problem list.    Problem List:  Patient Active Problem List   Diagnosis   • ST elevation myocardial infarction (STEMI) (CMS/HCC)   • Arthritis, rheumatoid (CMS/HCC)   • Benign essential hypertension   • Encounter for screening   • Hip pain   • Inguinal hernia, right   • Osteoarthritis of hip   • Other hyperlipidemia   • Coronary artery disease  "involving native coronary artery without angina pectoris   • Longstanding persistent atrial fibrillation (CMS/HCC)   • ACC/AHA stage C left heart failure with decreased ejection fraction (CMS/HCC)   • COVID-19 virus infection   • Pneumonia due to COVID-19 virus       Past Medical History:  Past Medical History:   Diagnosis Date   • Afib (CMS/HCC)    • CHF (congestive heart failure) (CMS/[facility])    • Coronary artery disease    • COVID-19    • Hearing loss    • Hyperlipidemia    • Hypertension        Past Surgical History:  Past Surgical History:   Procedure Laterality Date   • CARDIAC CATHETERIZATION N/A 7/8/2021    Procedure: Left Heart Cath;  Surgeon: Shay Mirza MD;  Location:  [facility];  Service: Cardiology;  Laterality: N/A;   • CARDIAC CATHETERIZATION N/A 7/8/2021    Procedure: Percutaneous Coronary Intervention;  Surgeon: Shay Mirza MD;  Location:  [facility];  Service: Cardiology;  Laterality: N/A;   • CARDIAC CATHETERIZATION N/A 7/9/2021    Procedure: Left Heart Cath;  Surgeon: Shay Mirza MD;  Location:  [facility];  Service: Cardiology;  Laterality: N/A;       Social History:  Social History     Socioeconomic History   • Marital status:      Spouse name: Not on file   • Number of children: Not on file   • Years of education: Not on file   • Highest education level: Not on file   Tobacco Use   • Smoking status: Never Smoker   • Smokeless tobacco: Never Used   Vaping Use   • Vaping Use: Never used   Substance and Sexual Activity   • Alcohol use: Not Currently   • Drug use: Not Currently   • Sexual activity: Defer       Allergies:  No Known Allergies    Immunizations:    There is no immunization history on file for this patient.    ROS:  ROS       Objective:         /70 (BP Location: Left arm, Patient Position: Sitting)   Pulse 82   Resp 18   Ht 172.7 cm (68\")   Wt 75.2 kg (165 lb 12.8 oz)   BMI " 25.21 kg/m²     Physical Exam    In-Office Procedure(s):  Procedures    ASCVD RIsk Score::  The ASCVD Risk score (Isabellemauricio SPARKS Jr., et al., 2013) failed to calculate for the following reasons:    The patient has a prior MI or stroke diagnosis    Recent Radiology:  Imaging Results (Most Recent)     None          Lab Review:   Lab Results   Component Value Date     09/16/2021    K 4.2 09/16/2021    CL 97 (L) 09/16/2021    CO2 28.0 09/16/2021    BUN 11 09/16/2021    CREATININE 0.81 09/16/2021    GLUCOSE 154 (H) 09/16/2021    CALCIUM 9.0 09/16/2021                Assessment:          Diagnosis Plan   1. Benign essential hypertension  Basic Metabolic Panel                     Shay Mirza MD  09/29/21  .

## 2021-10-05 ENCOUNTER — LAB (OUTPATIENT)
Dept: LAB | Facility: HOSPITAL | Age: 76
End: 2021-10-05

## 2021-10-05 DIAGNOSIS — I10 ESSENTIAL HYPERTENSION: ICD-10-CM

## 2021-10-05 DIAGNOSIS — I10 BENIGN ESSENTIAL HYPERTENSION: ICD-10-CM

## 2021-10-05 DIAGNOSIS — E78.49 OTHER HYPERLIPIDEMIA: ICD-10-CM

## 2021-10-05 LAB
ALBUMIN SERPL-MCNC: 4 G/DL (ref 3.5–5.2)
ALBUMIN/GLOB SERPL: 1.2 G/DL
ALP SERPL-CCNC: 95 U/L (ref 39–117)
ALT SERPL W P-5'-P-CCNC: 19 U/L (ref 1–41)
ANION GAP SERPL CALCULATED.3IONS-SCNC: 9.3 MMOL/L (ref 5–15)
AST SERPL-CCNC: 19 U/L (ref 1–40)
BASOPHILS # BLD AUTO: 0.1 10*3/MM3 (ref 0–0.2)
BASOPHILS NFR BLD AUTO: 1.4 % (ref 0–1.5)
BILIRUB SERPL-MCNC: 0.6 MG/DL (ref 0–1.2)
BUN SERPL-MCNC: 13 MG/DL (ref 8–23)
BUN/CREAT SERPL: 14.9 (ref 7–25)
CALCIUM SPEC-SCNC: 9.1 MG/DL (ref 8.6–10.5)
CHLORIDE SERPL-SCNC: 99 MMOL/L (ref 98–107)
CHOLEST SERPL-MCNC: 127 MG/DL (ref 0–200)
CO2 SERPL-SCNC: 27.7 MMOL/L (ref 22–29)
CREAT SERPL-MCNC: 0.87 MG/DL (ref 0.76–1.27)
DEPRECATED RDW RBC AUTO: 52.8 FL (ref 37–54)
EOSINOPHIL # BLD AUTO: 0.5 10*3/MM3 (ref 0–0.4)
EOSINOPHIL NFR BLD AUTO: 6.9 % (ref 0.3–6.2)
ERYTHROCYTE [DISTWIDTH] IN BLOOD BY AUTOMATED COUNT: 15.8 % (ref 12.3–15.4)
GFR SERPL CREATININE-BSD FRML MDRD: 85 ML/MIN/1.73
GLOBULIN UR ELPH-MCNC: 3.4 GM/DL
GLUCOSE SERPL-MCNC: 168 MG/DL (ref 65–99)
HCT VFR BLD AUTO: 37.9 % (ref 37.5–51)
HDLC SERPL-MCNC: 33 MG/DL (ref 40–60)
HGB BLD-MCNC: 12 G/DL (ref 13–17.7)
IMM GRANULOCYTES # BLD AUTO: 0.09 10*3/MM3 (ref 0–0.05)
IMM GRANULOCYTES NFR BLD AUTO: 1.2 % (ref 0–0.5)
LDLC SERPL CALC-MCNC: 71 MG/DL (ref 0–100)
LDLC/HDLC SERPL: 2.07 {RATIO}
LYMPHOCYTES # BLD AUTO: 1.26 10*3/MM3 (ref 0.7–3.1)
LYMPHOCYTES NFR BLD AUTO: 17.3 % (ref 19.6–45.3)
MCH RBC QN AUTO: 28.5 PG (ref 26.6–33)
MCHC RBC AUTO-ENTMCNC: 31.7 G/DL (ref 31.5–35.7)
MCV RBC AUTO: 90 FL (ref 79–97)
MONOCYTES # BLD AUTO: 0.8 10*3/MM3 (ref 0.1–0.9)
MONOCYTES NFR BLD AUTO: 11 % (ref 5–12)
NEUTROPHILS NFR BLD AUTO: 4.54 10*3/MM3 (ref 1.7–7)
NEUTROPHILS NFR BLD AUTO: 62.2 % (ref 42.7–76)
NRBC BLD AUTO-RTO: 0 /100 WBC (ref 0–0.2)
PLATELET # BLD AUTO: 195 10*3/MM3 (ref 140–450)
PMV BLD AUTO: 10.3 FL (ref 6–12)
POTASSIUM SERPL-SCNC: 3.8 MMOL/L (ref 3.5–5.2)
PROT SERPL-MCNC: 7.4 G/DL (ref 6–8.5)
RBC # BLD AUTO: 4.21 10*6/MM3 (ref 4.14–5.8)
SODIUM SERPL-SCNC: 136 MMOL/L (ref 136–145)
TRIGL SERPL-MCNC: 129 MG/DL (ref 0–150)
VLDLC SERPL-MCNC: 23 MG/DL (ref 5–40)
WBC # BLD AUTO: 7.29 10*3/MM3 (ref 3.4–10.8)

## 2021-10-05 PROCEDURE — 36415 COLL VENOUS BLD VENIPUNCTURE: CPT

## 2021-10-05 PROCEDURE — 85025 COMPLETE CBC W/AUTO DIFF WBC: CPT

## 2021-10-05 PROCEDURE — 80061 LIPID PANEL: CPT

## 2021-10-05 PROCEDURE — 80053 COMPREHEN METABOLIC PANEL: CPT

## 2021-11-08 RX ORDER — ATORVASTATIN CALCIUM 20 MG/1
20 TABLET, FILM COATED ORAL NIGHTLY
Qty: 30 TABLET | Refills: 3 | Status: CANCELLED | OUTPATIENT
Start: 2021-11-08

## 2021-11-09 RX ORDER — ATORVASTATIN CALCIUM 20 MG/1
20 TABLET, FILM COATED ORAL NIGHTLY
Qty: 30 TABLET | Refills: 5 | Status: SHIPPED | OUTPATIENT
Start: 2021-11-09 | End: 2022-03-16 | Stop reason: SDUPTHER

## 2021-11-29 ENCOUNTER — OFFICE VISIT (OUTPATIENT)
Dept: FAMILY MEDICINE CLINIC | Facility: CLINIC | Age: 76
End: 2021-11-29

## 2021-11-29 VITALS
DIASTOLIC BLOOD PRESSURE: 78 MMHG | OXYGEN SATURATION: 99 % | WEIGHT: 177.8 LBS | SYSTOLIC BLOOD PRESSURE: 153 MMHG | BODY MASS INDEX: 26.95 KG/M2 | TEMPERATURE: 97.1 F | HEART RATE: 68 BPM | RESPIRATION RATE: 16 BRPM | HEIGHT: 68 IN

## 2021-11-29 DIAGNOSIS — E78.49 OTHER HYPERLIPIDEMIA: Chronic | ICD-10-CM

## 2021-11-29 DIAGNOSIS — J12.82 PNEUMONIA DUE TO COVID-19 VIRUS: ICD-10-CM

## 2021-11-29 DIAGNOSIS — U07.1 COVID-19 VIRUS INFECTION: ICD-10-CM

## 2021-11-29 DIAGNOSIS — I48.11 LONGSTANDING PERSISTENT ATRIAL FIBRILLATION (HCC): Chronic | ICD-10-CM

## 2021-11-29 DIAGNOSIS — I50.1 ACC/AHA STAGE C LEFT HEART FAILURE WITH DECREASED EJECTION FRACTION (HCC): Chronic | ICD-10-CM

## 2021-11-29 DIAGNOSIS — I10 BENIGN ESSENTIAL HYPERTENSION: Primary | Chronic | ICD-10-CM

## 2021-11-29 DIAGNOSIS — U07.1 PNEUMONIA DUE TO COVID-19 VIRUS: ICD-10-CM

## 2021-11-29 DIAGNOSIS — M06.9 RHEUMATOID ARTHRITIS, INVOLVING UNSPECIFIED SITE, UNSPECIFIED WHETHER RHEUMATOID FACTOR PRESENT (HCC): Chronic | ICD-10-CM

## 2021-11-29 DIAGNOSIS — I25.10 CORONARY ARTERY DISEASE INVOLVING NATIVE CORONARY ARTERY OF NATIVE HEART WITHOUT ANGINA PECTORIS: Chronic | ICD-10-CM

## 2021-11-29 PROCEDURE — 99214 OFFICE O/P EST MOD 30 MIN: CPT | Performed by: FAMILY MEDICINE

## 2021-11-29 NOTE — PROGRESS NOTES
"Subjective   Matheus Jolly is a 76 y.o. male.     Chief Complaint   Patient presents with   • Hyperlipidemia     3 month follow up    • Hypertension   • Coronary Artery Disease   • Arthritis       HPI  Chief complaint: Hypertension hyperlipidemia coronary artery disease atrial fibrillation heart failure with decreased ejection fraction rheumatoid arthritis COVID-19 infection    The patient is a 76-year-old white male comes in for follow-up and maintenance of his current problems which include    1.  Hypertension-stable out of patient on metoprolol 25 mg daily Cozaar 25 mg daily.  He is asymptomatic.    2.  Hyperlipidemia-stable-patient on Lipitor 20 mg daily.  Denies myalgias and arthralgias.    3.  Coronary artery disease/heart failure decreased ejection fraction-stable-the patient on Effient 10 mg daily metoprolol 25 mg daily Cozaar 25 mg daily.  Patient denied chest pain shortness breath orthopnea or PND.    4.  Covid infection-stable in September of this year the patient had COVID-19 pneumonia.  Patient is recovered.  He denied symptoms.    5. atrial fibrillation-stable-patient on Pacerone 200 mg daily Eliquis 5 mg twice a day and metoprolol 25 mg daily.  He is asymptomatic.        The following portions of the patient's history were reviewed and updated as appropriate: allergies, current medications, past family history, past medical history, past social history, past surgical history and problem list.    Review of Systems    Objective     /78 (BP Location: Right arm, Patient Position: Sitting, Cuff Size: Adult)   Pulse 68   Temp 97.1 °F (36.2 °C) (Infrared)   Resp 16   Ht 172.7 cm (68\")   Wt 80.6 kg (177 lb 12.8 oz)   SpO2 99%   BMI 27.03 kg/m²     Physical Exam  Vitals and nursing note reviewed.   Constitutional:       Appearance: He is well-developed and normal weight.   HENT:      Head: Normocephalic and atraumatic.      Nose: Nose normal.      Mouth/Throat:      Mouth: Mucous membranes are " moist.      Pharynx: Oropharynx is clear.   Eyes:      Extraocular Movements: Extraocular movements intact.      Conjunctiva/sclera: Conjunctivae normal.      Pupils: Pupils are equal, round, and reactive to light.   Cardiovascular:      Rate and Rhythm: Normal rate and regular rhythm.      Pulses: Normal pulses.      Heart sounds: Normal heart sounds.   Pulmonary:      Effort: Pulmonary effort is normal.      Breath sounds: Normal breath sounds.   Abdominal:      General: Abdomen is flat. Bowel sounds are normal.      Palpations: Abdomen is soft.   Musculoskeletal:         General: Normal range of motion.      Cervical back: Neck supple.   Skin:     General: Skin is warm and dry.   Neurological:      Mental Status: He is alert and oriented to person, place, and time.   Psychiatric:         Behavior: Behavior normal.         Thought Content: Thought content normal.         Judgment: Judgment normal.         CBC & Differential (10/05/2021 11:13)  Comprehensive Metabolic Panel (10/05/2021 11:13)  Lipid Panel (10/05/2021 11:13)  CT Chest Pulmonary Embolism (09/16/2021 07:51)  ECG 12 Lead (09/16/2021 06:27)    Assessment/Plan   Diagnoses and all orders for this visit:    1. Benign essential hypertension (Primary)    2. Other hyperlipidemia    3. Coronary artery disease involving native coronary artery of native heart without angina pectoris    4. ACC/AHA stage C left heart failure with decreased ejection fraction (HCC)    5. Longstanding persistent atrial fibrillation (HCC)    6. Rheumatoid arthritis, involving unspecified site, unspecified whether rheumatoid factor present (HCC)    7. COVID-19 virus infection    8. Pneumonia due to COVID-19 virus      Patient Instructions   Continue your current medications and treatment.    Follow up in the office in 6 months.    Laboratory testing at that time.          Ho Ro Jr., MD    11/29/21

## 2021-12-22 ENCOUNTER — OFFICE VISIT (OUTPATIENT)
Dept: CARDIOLOGY | Facility: CLINIC | Age: 76
End: 2021-12-22

## 2021-12-22 VITALS
WEIGHT: 182.8 LBS | SYSTOLIC BLOOD PRESSURE: 148 MMHG | BODY MASS INDEX: 27.71 KG/M2 | DIASTOLIC BLOOD PRESSURE: 78 MMHG | RESPIRATION RATE: 18 BRPM | HEIGHT: 68 IN | HEART RATE: 67 BPM

## 2021-12-22 DIAGNOSIS — I10 PRIMARY HYPERTENSION: Primary | ICD-10-CM

## 2021-12-22 PROCEDURE — 99214 OFFICE O/P EST MOD 30 MIN: CPT | Performed by: INTERNAL MEDICINE

## 2021-12-22 RX ORDER — LOSARTAN POTASSIUM 50 MG/1
50 TABLET ORAL DAILY
Qty: 90 TABLET | Refills: 1 | Status: SHIPPED | OUTPATIENT
Start: 2021-12-22 | End: 2022-05-31

## 2021-12-22 NOTE — PROGRESS NOTES
Cardiology Clinic Note  Shay Mirza MD, PhD    Encounter Date:12/22/2021      Patient ID: Matheus Jolly is a 76 y.o. male.    Chief Complaint:  Chief Complaint   Patient presents with   • Follow-up       HPI:    Previously I had the pleasure to see this [age]-year-old gentleman in clinic today after hospitalization with anterior STEMI, Xience drug-eluting stent placement to the mid to proximal LAD, post procedurally either had thromboembolic event to the apical LAD versus microvascular occlusion at the apex, he was ultimately taken back to the Cath Lab a second time with ST elevations and Q waves throughout the inferior leads with large wraparound LAD with no reflow.  Balloon was passed except there was no reflow on second procedure.  Revascularization the small caliber wraparound LAD was abandoned ultimately his EF was 40%.  He had no cardiac arrhythmias, there was preservation of the basal anterior, mid anterolateral and all other segments other than the apex and apical inferior wall which were akinetic.  He had some periprocedural and hospitalization atrial fibrillation was placed on Eliquis for anticoagulation.  He remains on Effient and Eliquis.  There is no bleeding.  He denies any anginal chest pain, he is on statin therapy and no beta-blocker secondary to prior bradycardia heart rate 60s presently.  He is otherwise doing well with no peripheral edema, NYHA class II shortness of air, CCS class I.  He is well compensated and on goal-directed medical therapy     Presents back otherwise doing well with no heart failure signs or symptoms, he had recent Covid pneumonia on top of his coronary disease exacerbating his chest pain likely with demand ischemia with occluded apical LAD despite antiplatelet therapy.  His EF is around 40% chronically, as he is NYHA class II-III with respect to dyspnea on exertion but no swelling peripherally.  He is compliant with all medicines and no other complaints.  No volume  overload.  He has no energy he says after having Covid.     Blood pressure today is 130 systolic with heart rates in the 60s.  His blood pressure will clearly support increasing his losartan which would be indicated for ischemic cardiomyopathy     Review of systems negative x14 point review of systems except was mentioned above     Historical data copied forward from previous encounters in EMR is unchanged     Assessment and plan per my encounter today  CAD status post anterior STEMI EF 40%  Ischemic or myopathy  Systolic heart failure EF 40%, presently compensated  Essential hypertension  Paroxysmal atrial fibrillation     Plan today  Effient and Eliquis on board continue with stent thrombosis, would like to continue Effient for least 6 months and then changed to Plavix indefinitely  Paroxysmal atrial fibrillation continue amiodarone for now and will stop this likely in 6 months  Atorvastatin 20, optimally should be on 40 will evaluate for increase to high-dose therapy  No beta-blocker secondary to prior bradycardia despite cardiomyopathy, heart rate 70s  Continue amiodarone for prophylaxis of atrial fibrillation which was difficult to control in the hospital  Hypertension, cardiomyopathy, increase losartan to 50 daily  Repeat CMP to evaluate renal function and liver function on high risk medicines    Monitor high risk medicines on amiodarone           Return to clinic in 3 to 6 months for further titration of his goal-directed medical therapy  No indication for ICD placement with EF greater than 35%, no secondary ventricular arrhythmias, well compensated presently     Further conditions follow findings clinical course and response to goal-directed medical therapy     It is a pleasure to be involved in his cardiovascular care.  Please call with any questions or concerns  Shay Mirza MD, PhD    Historical data copied forward from previous encounters in EMR including the history, exam, and assessment/plan has been  "reviewed and is unchanged unless noted otherwise.    Cardiac medicines reviewed with risk, benefits, and necessity of each discussed.    Risk and benefit of cardiac testing reviewed including death heart attack stroke pain bleeding infection need for vascular /cardiovascular surgery were discussed and the patient     Objective:         /78 (BP Location: Left arm, Patient Position: Sitting)   Pulse 67   Resp 18   Ht 172.7 cm (68\")   Wt 82.9 kg (182 lb 12.8 oz)   BMI 27.79 kg/m²     Physical Exam    Assessment:         There are no diagnoses linked to this encounter.       Plan:              The pleasure to be involved in this patient's cardiovascular care.  Please call with any questions or concerns  Shay Mirza MD, PhD    Most recent EKG as reviewed and interpreted by me:  Procedures     Most recent echo as reviewed and interpreted by me:  Results for orders placed during the hospital encounter of 07/08/21    Adult Transthoracic Echo Complete w/ Color, Spectral and Contrast if Necessary Per Protocol    Interpretation Summary  · Mild dilation of the aortic root is present.  · Estimated left ventricular EF = 45% Estimated left ventricular EF was in disagreement with the calculated left ventricular EF. Left ventricular ejection fraction appears to be 41 - 45%. Left ventricular systolic function is low normal.  · Left ventricular diastolic function is consistent with (grade Ia w/high LAP) impaired relaxation.  · Estimated right ventricular systolic pressure from tricuspid regurgitation is normal (<35 mmHg).    Overall EF 45%  Regional abnormalities as identified consistent with LAD injury, apex, anteroapical, inferoapical, mid apical septum all severely hypokinetic, other segments appear to contract normally  RV size and function normal  Atrial sizes grossly normal  Grade 1 diastolic dysfunction by mitral inflow pattern  No masses or effusion seen  Right atrial pressure estimated 5-8  Trace to mild MR, " trace to mild TR no valvular stenoses seen  Structurally normal aortic valve in limited views, no regurgitation or stenosis by Doppler  Pulmonic valve not well seen no stenosis by Doppler  No Doppler evidence of ASD or PFO      Most recent stress test as reviewed and interpreted by me:      Most recent cardiac catheterization as reviewed interpreted by me:  Results for orders placed during the hospital encounter of 07/08/21    Cardiac Catheterization/Vascular Study    Narrative   Shay Mirza MD, PhD  [facility]  Date of service 7-9-21    Procedure  A 1 left heart catheterization coronary angiography, left ventriculography    Indication  Recurrent ST elevation status post PCI to the LAD, worsening inferior changes    I performed consent the patient brought to the catheterization lab sterilely prepped and draped in usual fashion expose the right groin for right common femoral arterial access via micropuncture modified Seldinger technique after 1% lidocaine analgesia, 6 [place] sheath was placed the right common femoral artery ultimately upsized to 23 cm sheath secondary to iliofemoral tortuosity again.  A diagnostic JL4 JR4 catheters were used to selective left and right coronary angiography respectively, the JR4 was used across aortic valve followed by left ventriculography via hand-injection demonstrating inferoapical and inferolateral severe hypokinesis, anterior wall had some mild hypokinesis, apex and inferoapical wall was akinetic.  LVEDP was not terribly elevated at 14.  Secondary to findings of apical LAD occlusion a EBU 4.0 guide was exchanged with a diagnostic catheter, patient was heparinized with 100 units/kg of heparin for ACT greater than 250, run-through wire was attempted to pass around the wraparound LAD apex, small subsegmental branches or cannulated repeatedly without true wraparound.  Films were reviewed from initial procedure yesterday demonstrating small  threadlike wraparound LAD less than 1.5 mm in diameter.  And subsequently branches a 2.0 x 20 undeployed NC balloon was passed and retracted undeployed and attempted to restore flow, there was some mild improvement to small branches distally but wraparound LAD was never identified despite multiple wires.  Patient was hemodynamically and electrically stable in sinus rhythm, RCA, RPDA and PLV branches had YVES-3 flow, circumflex as well as all obtuse marginal branches and L PLV branch were all patent with YVES-3 flow, mid LAD stent as well as diagonal branch all widely patent with no in-stent thrombus or occlusion was seen with YVES-3 flow.  At this point of the procedure possibly with no reflow of the apex progression of MI versus distal thromboembolic event from trapped mid LAD thrombus at the site of plaque rupture and STEMI without improvement passing into play balloon in size the vessel being very small less than 1.5 mm diameter, risk versus benefit was assessed and I chose to conclude the procedure at this point.    Patient left the Cath Lab hemodynamically electrically stable or talking staff neurologically grossly intact bilaterally  Contrast usage 85 cc  Moderate conscious sedation time of 1 hour  IV Versed and fentanyl ministered by registered nurse with complete ECG pulse oximetry and hemodynamic 93 the entire to the case observed by me    Findings  1.  Opening pressure of 100/58  2.  Closing pressure at the femoral is 120/65  3.  LVEDP 14, LV function moderately to severely reduced EF estimated 35 to 40% regional abnormalities in the inferior mid to apical segments as well as akinesis of the apex, basal inferior, lateral, anteroseptal and anterior walls move normally  4.  No transaortic valve gradient on pullback    Angiography  1.  Left main unchanged, 50 to 60% narrowing with acute angle takeoff unchanged from yesterday  2.  Proximal LAD with 40% narrowing, widely patent stent of the midportion jailing  diagonal branch widely patent, diffusely regularities until the apex which was under precluded at the wraparound portion of drastically tapering LAD, likely 2 mm or less in diameter, prior films show dramatic tapering to less than 1.5 to 1 mm and wraparound LAD which supplied the inferoapical wall, diagonal branch widely patent with YVES-3 flow  3.  Circumflex widely patent including all obtuse marginal branches with OM1 and OM2 diffusely regularities, continuation circumflex and L PLV branch has 60 to 70% small caliber eccentric plaque which should be treated medically but had YVES-3 flow in all branches  4.  RCA YVES-3 flow throughout diffuse luminal regularities, ostial proximal PLV branch had 70-80 but in small caliber portion the vessel less than 1.5 mm in diameter with limited runoff, ostial RPDA 50 to 60% but in small caliber portion of the vessel but likely could be stented with 2.0 mm stent if needed but had YVES-3 flow and not culprit for current presentation    Conclusions and recommendations  1 no refill of the apical LAD versus thromboembolic event from trapped thrombus in the mid LAD, unsuccessful restoration of flow with wire and passage of undeployed balloon, very small caliber portion of the vessel  #2 Integrilin drip for 18 hours on background of aspirin and Brilinta  3 maximize goal-directed medical therapy with anteroapical infarct, Aldactone has been started in addition to low-dose beta-blocker, will evaluate for candidacy for ACE inhibitor or ARB prior to discharge blood pressure will tolerate  4.  Observe in ICU, increased risk for apical aneurysmal formation, increased risk for possible arrhythmogenic event, monitor closely    Further recommendations based on clinical course in ICU with close monitoring secondary event despite max medical therapy    Shay Miraz MD, PhD    The following portions of the patient's history were reviewed and updated as appropriate: allergies, current  medications, past family history, past medical history, past social history, past surgical history and problem list.      ROS:  14 point review of systems negative except as mentioned above    Current Outpatient Medications:   •  acetaminophen (TYLENOL) 325 MG tablet, Take 650 mg by mouth As Needed for Mild Pain ., Disp: , Rfl:   •  amiodarone (PACERONE) 200 MG tablet, Take 1 tablet by mouth Daily., Disp: 90 tablet, Rfl: 1  •  apixaban (ELIQUIS) 5 MG tablet tablet, Take 1 tablet by mouth Every 12 (Twelve) Hours. Indications: Atrial Fibrillation, Disp: 60 tablet, Rfl: 3  •  ascorbic acid (EQL Vitamin C) 1000 MG tablet, Take 1,000 mg by mouth Daily As Needed., Disp: , Rfl:   •  atorvastatin (LIPITOR) 20 MG tablet, Take 1 tablet by mouth Every Night., Disp: 30 tablet, Rfl: 5  •  losartan (Cozaar) 25 MG tablet, Take 1 tablet by mouth Daily., Disp: 30 tablet, Rfl: 5  •  metoprolol succinate XL (TOPROL-XL) 25 MG 24 hr tablet, Take 1 tablet by mouth Daily., Disp: 30 tablet, Rfl: 5  •  multivitamin with minerals (CENTRUM SILVER ULTRA MENS PO), Take 1 tablet by mouth Every Other Day. Alternating with Vitamin B12, Disp: , Rfl:   •  nitroglycerin (Nitrostat) 0.4 MG SL tablet, Place 1 tablet under the tongue Every 5 (Five) Minutes As Needed for Chest Pain. Take no more than 3 doses in 15 minutes., Disp: 25 tablet, Rfl: 0  •  prasugrel (Effient) 10 MG tablet, Take 60mg by mouth on the first day and then take 10mg by mouth daily thereafter. (Patient taking differently: Take 10 mg by mouth Daily. Take 60mg by mouth on the first day and then take 10mg by mouth daily thereafter.), Disp: 35 tablet, Rfl: 5  •  spironolactone (ALDACTONE) 25 MG tablet, Take 1 tablet by mouth Daily., Disp: 90 tablet, Rfl: 1  •  traMADol (ULTRAM) 50 MG tablet, Take 1 tablet by mouth Every 6 (Six) Hours As Needed for Moderate Pain ., Disp: 12 tablet, Rfl: 0  •  vitamin B-12 (CYANOCOBALAMIN) 1000 MCG tablet, Take 1,000 mcg by mouth Every Other Day.  Alternating with Centrum, Disp: , Rfl:   •  vitamin D3 (Vitamin D) 125 MCG (5000 UT) capsule capsule, Take 5,000 Units by mouth Every Other Day., Disp: , Rfl:     Problem List:  Patient Active Problem List   Diagnosis   • ST elevation myocardial infarction (STEMI) (HCC)   • Arthritis, rheumatoid (HCC)   • Benign essential hypertension   • Encounter for screening   • Inguinal hernia, right   • Osteoarthritis of hip   • Other hyperlipidemia   • Coronary artery disease involving native coronary artery without angina pectoris   • Longstanding persistent atrial fibrillation (HCC)   • ACC/AHA stage C left heart failure with decreased ejection fraction (HCC)   • COVID-19 virus infection   • Pneumonia due to COVID-19 virus     Past Medical History:  Past Medical History:   Diagnosis Date   • Afib (HCC)    • CHF (congestive heart failure) (HCC)    • Coronary artery disease    • COVID-19    • Hearing loss    • Hyperlipidemia    • Hypertension      Past Surgical History:  Past Surgical History:   Procedure Laterality Date   • CARDIAC CATHETERIZATION N/A 7/8/2021    Procedure: Left Heart Cath;  Surgeon: Shay Mirza MD;  Location: [facility] CATH INVASIVE LOCATION;  Service: Cardiology;  Laterality: N/A;   • CARDIAC CATHETERIZATION N/A 7/8/2021    Procedure: Percutaneous Coronary Intervention;  Surgeon: Shay Mirza MD;  Location:  [facility] CATH INVASIVE LOCATION;  Service: Cardiology;  Laterality: N/A;   • CARDIAC CATHETERIZATION N/A 7/9/2021    Procedure: Left Heart Cath;  Surgeon: Shay Mirza MD;  Location: [facility] CATH INVASIVE LOCATION;  Service: Cardiology;  Laterality: N/A;     Social History:  Social History     Socioeconomic History   • Marital status:    Tobacco Use   • Smoking status: Never Smoker   • Smokeless tobacco: Never Used   Vaping Use   • Vaping Use: Never used   Substance and Sexual Activity   • Alcohol use: Not Currently   • Drug use: Not Currently   • Sexual activity:  Defer     Allergies:  No Known Allergies  Immunizations:  Immunization History   Administered Date(s) Administered   • COVID-19 (PFIZER) 11/15/2021            In-Office Procedure(s):  No orders to display        ASCVD RIsk Score::  The ASCVD Risk score (Unadillamauricio SPARKS Jr., et al., 2013) failed to calculate for the following reasons:    The patient has a prior MI or stroke diagnosis    Imaging:    Results for orders placed during the hospital encounter of 09/16/21    XR Chest 1 View    Narrative  DATE OF EXAM:  9/16/2021 7:03 AM    PROCEDURE:  XR CHEST 1 VW-    INDICATIONS:  Chest pain protocol    COMPARISON:  8/15/2021    TECHNIQUE:  Single radiographic view of the chest was obtained.    FINDINGS:  There is irregular groundglass and consolidative opacity within the left  lung base. There is also patchy opacity in the right medial lung base.  Cardiomediastinal contours within normal limits. No evidence of  pneumothorax. Regional skeleton is unremarkable.    Impression  1. Left greater than right multifocal airspace opacity, concerning for  multifocal pneumonia, including Covid 19 pneumonia.    Electronically Signed By-Efrem Rodriguez MD On:9/16/2021 7:57 AM  This report was finalized on [id number] by  Efrem Rodriguez MD.       Results for orders placed during the hospital encounter of 09/16/21    CT Chest Pulmonary Embolism    Narrative  CT CHEST PULMONARY EMBOLISM-    Date of Exam: 9/16/2021 7:42 AM    Indication: PE suspected, low/intermediate prob, positive D-dimer.  Left-sided chest pain. Elevated d-dimer.    Comparison: Chest radiograph 9/16/2021    Technique: Serial and axial CT images of the chest were obtained  following the uneventful intravenous administration of 100 cc Isovue-370  contrast. Reconstructions in the coronal and sagittal planes were also  performed.  Automated exposure control and iterative reconstruction  methods were used.    FINDINGS:    Pulmonary Arteries: Excellent contrast opacification of the  pulmonary  arteries.  No intraluminal filling defects to suggest pulmonary embolus.  The pulmonary arteries are normal in caliber.      Hilum and Mediastinum: There are no pathologically enlarged hilar  mediastinal lymph nodes. There is cardiomegaly. There is a coronary  stent. No pericardial effusion.    Lung Parenchyma and Pleura: There are small bilateral pleural effusions.  There is multifocal consolidation in both lower lobes and in the  posterior left upper lobe and lingula. Septal thickening. There is a  pleural-based nodule in the posterior right upper lobe on image number  51. It measures 7 mm.    Upper Abdomen: Unremarkable.    Soft tissues: Unremarkable.    Osseous structures: There is degenerative change with anterior bridging  osteophytes. There are degenerative changes at costovertebral junction  inferiorly. Correlate for possible ankylosing spondylitis. No aggressive  focal lytic or sclerotic osseous lesions.    Impression  No findings of pulmonary embolus.  Multifocal airspace opacity consistent multifocal pneumonia. COVID 19  pneumonia most likely.  Small pleural effusions indicated could be a component of pulmonary  edema.  Cardiomegaly.        Electronically Signed By-Kenia Santizo MD On:9/16/2021 8:02 AM  This report was finalized on [id number] by  Kenia Santizo MD.      Results for orders placed during the hospital encounter of 09/16/21    CT Chest Pulmonary Embolism    Narrative  CT CHEST PULMONARY EMBOLISM-    Date of Exam: 9/16/2021 7:42 AM    Indication: PE suspected, low/intermediate prob, positive D-dimer.  Left-sided chest pain. Elevated d-dimer.    Comparison: Chest radiograph 9/16/2021    Technique: Serial and axial CT images of the chest were obtained  following the uneventful intravenous administration of 100 cc Isovue-370  contrast. Reconstructions in the coronal and sagittal planes were also  performed.  Automated exposure control and iterative reconstruction  methods were  used.    FINDINGS:    Pulmonary Arteries: Excellent contrast opacification of the pulmonary  arteries.  No intraluminal filling defects to suggest pulmonary embolus.  The pulmonary arteries are normal in caliber.      Hilum and Mediastinum: There are no pathologically enlarged hilar  mediastinal lymph nodes. There is cardiomegaly. There is a coronary  stent. No pericardial effusion.    Lung Parenchyma and Pleura: There are small bilateral pleural effusions.  There is multifocal consolidation in both lower lobes and in the  posterior left upper lobe and lingula. Septal thickening. There is a  pleural-based nodule in the posterior right upper lobe on image number  51. It measures 7 mm.    Upper Abdomen: Unremarkable.    Soft tissues: Unremarkable.    Osseous structures: There is degenerative change with anterior bridging  osteophytes. There are degenerative changes at costovertebral junction  inferiorly. Correlate for possible ankylosing spondylitis. No aggressive  focal lytic or sclerotic osseous lesions.    Impression  No findings of pulmonary embolus.  Multifocal airspace opacity consistent multifocal pneumonia. COVID 19  pneumonia most likely.  Small pleural effusions indicated could be a component of pulmonary  edema.  Cardiomegaly.        Electronically Signed By-Kenia Santizo MD On:9/16/2021 8:02 AM  This report was finalized on [id number] by  Kenia Santizo MD.      Lab Review:           Recent labs reviewed and interpreted for clinical significance and application            Level of Care:           Shay Mirza MD  12/22/21  .

## 2021-12-24 RX ORDER — APIXABAN 5 MG/1
5 TABLET, FILM COATED ORAL EVERY 12 HOURS SCHEDULED
Qty: 60 TABLET | Refills: 3 | Status: CANCELLED | OUTPATIENT
Start: 2021-12-24

## 2021-12-27 RX ORDER — APIXABAN 5 MG/1
5 TABLET, FILM COATED ORAL EVERY 12 HOURS SCHEDULED
Qty: 60 TABLET | Refills: 3 | Status: CANCELLED | OUTPATIENT
Start: 2021-12-24

## 2022-01-28 RX ORDER — AMIODARONE HYDROCHLORIDE 200 MG/1
200 TABLET ORAL
Qty: 90 TABLET | Refills: 1 | Status: SHIPPED | OUTPATIENT
Start: 2022-01-28 | End: 2022-05-31

## 2022-02-01 RX ORDER — PRASUGREL 10 MG/1
TABLET, FILM COATED ORAL
Qty: 35 TABLET | Refills: 5 | Status: CANCELLED | OUTPATIENT
Start: 2022-02-01

## 2022-02-02 RX ORDER — SPIRONOLACTONE 25 MG/1
25 TABLET ORAL DAILY
Qty: 90 TABLET | Refills: 1 | Status: SHIPPED | OUTPATIENT
Start: 2022-02-02 | End: 2022-07-26 | Stop reason: SDUPTHER

## 2022-02-02 RX ORDER — SPIRONOLACTONE 25 MG/1
25 TABLET ORAL DAILY
Qty: 90 TABLET | Refills: 1 | Status: CANCELLED | OUTPATIENT
Start: 2022-02-02

## 2022-02-02 RX ORDER — PRASUGREL 10 MG/1
TABLET, FILM COATED ORAL
Qty: 35 TABLET | Refills: 5 | Status: SHIPPED | OUTPATIENT
Start: 2022-02-02 | End: 2022-09-01 | Stop reason: SDUPTHER

## 2022-03-01 RX ORDER — METOPROLOL SUCCINATE 25 MG/1
25 TABLET, EXTENDED RELEASE ORAL DAILY
Qty: 30 TABLET | Refills: 5 | OUTPATIENT
Start: 2022-03-01

## 2022-03-01 NOTE — TELEPHONE ENCOUNTER
Per dr phan note--No beta-blocker secondary to prior bradycardia despite cardiomyopathy, heart rate 70s

## 2022-03-17 RX ORDER — METOPROLOL SUCCINATE 25 MG/1
25 TABLET, EXTENDED RELEASE ORAL DAILY
Qty: 30 TABLET | Refills: 5 | Status: SHIPPED | OUTPATIENT
Start: 2022-03-17 | End: 2022-09-01 | Stop reason: SDUPTHER

## 2022-03-17 RX ORDER — ATORVASTATIN CALCIUM 20 MG/1
20 TABLET, FILM COATED ORAL NIGHTLY
Qty: 30 TABLET | Refills: 5 | Status: SHIPPED | OUTPATIENT
Start: 2022-03-17 | End: 2022-09-01 | Stop reason: SDUPTHER

## 2022-05-02 RX ORDER — APIXABAN 5 MG/1
5 TABLET, FILM COATED ORAL EVERY 12 HOURS SCHEDULED
Qty: 60 TABLET | Refills: 3 | Status: CANCELLED | OUTPATIENT
Start: 2022-05-02

## 2022-05-31 ENCOUNTER — LAB (OUTPATIENT)
Dept: LAB | Facility: HOSPITAL | Age: 77
End: 2022-05-31

## 2022-05-31 ENCOUNTER — OFFICE VISIT (OUTPATIENT)
Dept: FAMILY MEDICINE CLINIC | Facility: CLINIC | Age: 77
End: 2022-05-31

## 2022-05-31 ENCOUNTER — OFFICE VISIT (OUTPATIENT)
Dept: CARDIOLOGY | Facility: CLINIC | Age: 77
End: 2022-05-31

## 2022-05-31 VITALS
WEIGHT: 195.8 LBS | RESPIRATION RATE: 18 BRPM | HEART RATE: 57 BPM | HEIGHT: 68 IN | SYSTOLIC BLOOD PRESSURE: 142 MMHG | DIASTOLIC BLOOD PRESSURE: 78 MMHG | BODY MASS INDEX: 29.67 KG/M2

## 2022-05-31 VITALS
DIASTOLIC BLOOD PRESSURE: 87 MMHG | RESPIRATION RATE: 16 BRPM | WEIGHT: 195 LBS | HEIGHT: 68 IN | SYSTOLIC BLOOD PRESSURE: 140 MMHG | OXYGEN SATURATION: 98 % | HEART RATE: 61 BPM | BODY MASS INDEX: 29.55 KG/M2 | TEMPERATURE: 97.1 F

## 2022-05-31 DIAGNOSIS — I25.10 CORONARY ARTERY DISEASE INVOLVING NATIVE CORONARY ARTERY OF NATIVE HEART WITHOUT ANGINA PECTORIS: Chronic | ICD-10-CM

## 2022-05-31 DIAGNOSIS — I10 PRIMARY HYPERTENSION: ICD-10-CM

## 2022-05-31 DIAGNOSIS — E78.49 OTHER HYPERLIPIDEMIA: Chronic | ICD-10-CM

## 2022-05-31 DIAGNOSIS — I50.1 ACC/AHA STAGE C LEFT HEART FAILURE WITH DECREASED EJECTION FRACTION: Chronic | ICD-10-CM

## 2022-05-31 DIAGNOSIS — I10 BENIGN ESSENTIAL HYPERTENSION: Primary | Chronic | ICD-10-CM

## 2022-05-31 DIAGNOSIS — I50.1 ACC/AHA STAGE C LEFT HEART FAILURE WITH DECREASED EJECTION FRACTION: ICD-10-CM

## 2022-05-31 DIAGNOSIS — I10 BENIGN ESSENTIAL HYPERTENSION: Chronic | ICD-10-CM

## 2022-05-31 DIAGNOSIS — M06.9 RHEUMATOID ARTHRITIS, INVOLVING UNSPECIFIED SITE, UNSPECIFIED WHETHER RHEUMATOID FACTOR PRESENT: Chronic | ICD-10-CM

## 2022-05-31 DIAGNOSIS — I48.11 LONGSTANDING PERSISTENT ATRIAL FIBRILLATION: Chronic | ICD-10-CM

## 2022-05-31 DIAGNOSIS — I25.10 CORONARY ARTERY DISEASE INVOLVING NATIVE CORONARY ARTERY OF NATIVE HEART WITHOUT ANGINA PECTORIS: ICD-10-CM

## 2022-05-31 DIAGNOSIS — I21.02 ST ELEVATION MYOCARDIAL INFARCTION INVOLVING LEFT ANTERIOR DESCENDING (LAD) CORONARY ARTERY: ICD-10-CM

## 2022-05-31 DIAGNOSIS — I10 BENIGN ESSENTIAL HYPERTENSION: ICD-10-CM

## 2022-05-31 DIAGNOSIS — I10 PRIMARY HYPERTENSION: Primary | ICD-10-CM

## 2022-05-31 DIAGNOSIS — I48.11 LONGSTANDING PERSISTENT ATRIAL FIBRILLATION: ICD-10-CM

## 2022-05-31 PROBLEM — U07.1 PNEUMONIA DUE TO COVID-19 VIRUS: Status: RESOLVED | Noted: 2021-08-27 | Resolved: 2022-05-31

## 2022-05-31 PROBLEM — J12.82 PNEUMONIA DUE TO COVID-19 VIRUS: Status: RESOLVED | Noted: 2021-08-27 | Resolved: 2022-05-31

## 2022-05-31 PROBLEM — U07.1 COVID-19 VIRUS INFECTION: Status: RESOLVED | Noted: 2021-08-27 | Resolved: 2022-05-31

## 2022-05-31 LAB
ALBUMIN SERPL-MCNC: 4.7 G/DL (ref 3.5–5.2)
ALBUMIN/GLOB SERPL: 2 G/DL
ALP SERPL-CCNC: 72 U/L (ref 39–117)
ALT SERPL W P-5'-P-CCNC: 19 U/L (ref 1–41)
ANION GAP SERPL CALCULATED.3IONS-SCNC: 12 MMOL/L (ref 5–15)
AST SERPL-CCNC: 23 U/L (ref 1–40)
BILIRUB SERPL-MCNC: 0.8 MG/DL (ref 0–1.2)
BUN SERPL-MCNC: 15 MG/DL (ref 8–23)
BUN/CREAT SERPL: 14.4 (ref 7–25)
CALCIUM SPEC-SCNC: 9.3 MG/DL (ref 8.6–10.5)
CHLORIDE SERPL-SCNC: 102 MMOL/L (ref 98–107)
CO2 SERPL-SCNC: 26 MMOL/L (ref 22–29)
CREAT SERPL-MCNC: 1.04 MG/DL (ref 0.76–1.27)
EGFRCR SERPLBLD CKD-EPI 2021: 73.5 ML/MIN/1.73
GLOBULIN UR ELPH-MCNC: 2.3 GM/DL
GLUCOSE SERPL-MCNC: 115 MG/DL (ref 65–99)
POTASSIUM SERPL-SCNC: 4.4 MMOL/L (ref 3.5–5.2)
PROT SERPL-MCNC: 7 G/DL (ref 6–8.5)
SODIUM SERPL-SCNC: 140 MMOL/L (ref 136–145)
TSH SERPL DL<=0.05 MIU/L-ACNC: 1.62 UIU/ML (ref 0.27–4.2)

## 2022-05-31 PROCEDURE — 84443 ASSAY THYROID STIM HORMONE: CPT

## 2022-05-31 PROCEDURE — 36415 COLL VENOUS BLD VENIPUNCTURE: CPT

## 2022-05-31 PROCEDURE — 99214 OFFICE O/P EST MOD 30 MIN: CPT | Performed by: FAMILY MEDICINE

## 2022-05-31 PROCEDURE — 80053 COMPREHEN METABOLIC PANEL: CPT

## 2022-05-31 PROCEDURE — 99214 OFFICE O/P EST MOD 30 MIN: CPT | Performed by: INTERNAL MEDICINE

## 2022-05-31 NOTE — PROGRESS NOTES
"Subjective   Matheus Jolly is a 77 y.o. male.     Chief Complaint   Patient presents with   • Hypertension     6 month follow up   • Hyperlipidemia   • Coronary Artery Disease       HPI  Chief complaint: Hypertension hyperlipidemia atrial fibrillation coronary artery disease heart failure with decreased ejection fraction    The patient is a 77-year-old white male comes in for follow-up and maintenance of his current problems which include    1.  Hypertension-stable-patient is currently on Aldactone 25 mg daily metoprolol 25 mg daily Cozaar 50 mg daily.  Blood pressure initially when he came in the office today was high.  It came down to 140/87.    2.  Hyperlipidemia-stable-the patient is currently on Lipitor 20 mg daily.    3.  Atrial fibrillation-stable-patient on Eliquis 5 mg twice a day and metoprolol 25 mg daily and Pacerone 200 mg daily.  He denied episodes of rapid or slow heart rhythm.    4.  Coronary artery disease/heart failure with decreased ejection fraction-stable-patient is currently on Aldactone 25 mg daily metoprolol 25 mg daily and Cozaar 50 mg daily and Effient 10 mg daily.  He denied chest pain shortness of breath orthopnea or PND.  His left ventricular ejection fraction was 40 to 45%.    5.  Rheumatoid arthritis-stable-patient is on no disease modifying agents.  He does take tramadol every 6 hours as needed 50 mg.        The following portions of the patient's history were reviewed and updated as appropriate: allergies, current medications, past family history, past medical history, past social history, past surgical history and problem list.    Review of Systems    Objective     /87   Pulse 61   Temp 97.1 °F (36.2 °C) (Infrared)   Resp 16   Ht 172.7 cm (68\")   Wt 88.5 kg (195 lb)   SpO2 98%   BMI 29.65 kg/m²     Physical Exam  Vitals and nursing note reviewed.   Constitutional:       Appearance: He is well-developed.   HENT:      Head: Normocephalic and atraumatic.   Eyes:      " Pupils: Pupils are equal, round, and reactive to light.   Cardiovascular:      Rate and Rhythm: Normal rate. Rhythm irregularly irregular.      Pulses: Normal pulses.      Heart sounds: No murmur heard.    No friction rub. No gallop.   Pulmonary:      Effort: Pulmonary effort is normal.      Breath sounds: Normal breath sounds.   Abdominal:      General: Bowel sounds are normal.      Palpations: Abdomen is soft.   Musculoskeletal:         General: Normal range of motion.      Cervical back: Neck supple.   Skin:     General: Skin is warm and dry.   Neurological:      Mental Status: He is oriented to person, place, and time.   Psychiatric:         Behavior: Behavior normal.         Thought Content: Thought content normal.         Judgment: Judgment normal.       ECG 12 Lead (07/09/2021 16:17)  Cardiac Catheterization/Vascular Study (07/09/2021 14:23)  Adult Transthoracic Echo Complete w/ Color, Spectral and Contrast if Necessary Per Protocol (07/09/2021 13:28)  CT Chest Pulmonary Embolism (09/16/2021 07:51)      Assessment & Plan   Diagnoses and all orders for this visit:    1. Benign essential hypertension (Primary)  -     Basic Metabolic Panel; Future    2. Other hyperlipidemia    3. Coronary artery disease involving native coronary artery of native heart without angina pectoris    4. ACC/AHA stage C left heart failure with decreased ejection fraction (HCC)    5. Longstanding persistent atrial fibrillation (HCC)    6. Rheumatoid arthritis, involving unspecified site, unspecified whether rheumatoid factor present (HCC)      Patient Instructions   Continue current medications and treatment.    Have the follow-up labs done and call for results.    Follow-up in the office in 3 months.      Ho Ro Jr., MD    05/31/22

## 2022-05-31 NOTE — PATIENT INSTRUCTIONS
Continue current medications and treatment.    Have the follow-up labs done and call for results.    Follow-up in the office in 3 months.

## 2022-06-08 NOTE — PROGRESS NOTES
Cardiology Clinic Note  Shay Mirza MD, PhD    Subjective:     Encounter Date:05/31/2022      Patient ID: Matheus Jolly is a 77 y.o. male.    Chief Complaint:  Chief Complaint   Patient presents with   • Follow-up       HPI:      Previously I had the pleasure to see this 77-year-old gentleman in clinic for follow-up, last year 2021 he was hospitalization with anterior STEMI, Xience drug-eluting stent placement to the mid to proximal LAD, post procedurally either had thromboembolic event to the apical LAD versus microvascular occlusion at the apex, he was ultimately taken back to the Cath Lab a second time with ST elevations and Q waves throughout the inferior leads with large wraparound LAD with no reflow.  Balloon was passed except there was no reflow on second procedure.  Revascularization the small caliber wraparound LAD was abandoned ultimately his EF was 40%.  He had no cardiac arrhythmias, there was preservation of the basal anterior, mid anterolateral and all other segments other than the apex and apical inferior wall which were akinetic.  He had some periprocedural and hospitalization atrial fibrillation was placed on Eliquis for anticoagulation.  He remains on Effient and Eliquis.  There is no bleeding.  He denies any anginal chest pain, he is on statin therapy and no beta-blocker secondary to prior bradycardia heart rate 60s presently.  He is otherwise doing well with no peripheral edema, NYHA class II shortness of air, CCS class I.  He is well compensated and on goal-directed medical therapy     Presents back otherwise doing well with no heart failure signs or symptoms, he had recent Covid pneumonia this year on top of his coronary disease exacerbating his chest pain likely with demand ischemia with occluded apical LAD despite antiplatelet therapy.  His EF is around 40% chronically, as he is NYHA class II-III with respect to dyspnea on exertion but no swelling peripherally.  He is compliant with all  medicines and no other complaints.  No volume overload.  He has no energy he says after having Covid.  Otherwise he is doing extremely well.  No recurrence of any atrial fibrillation, blood pressure is 142/78 and we are trying to optimize his medicines further and decrease his risk for long-term side effects.  Accordingly we will stop his amiodarone today, continue therapies for ischemic cardiomyopathy as well as stroke risk reduction for paroxysmal atrial fibrillation.  He is on max tolerated great medicines that are guideline indicated     Blood pressure today is 130 systolic with heart rates in the 60s.  His blood pressure will clearly support increasing his losartan which would be indicated for ischemic cardiomyopathy     Review of systems negative x14 point review of systems except was mentioned above     Historical data copied forward from previous encounters in EMR is unchanged     Assessment and plan per my encounter today  CAD status post anterior STEMI EF 40%  Ischemic or myopathy  Systolic heart failure EF 40%, presently compensated  Essential hypertension  Paroxysmal atrial fibrillation     Plan today  Stop amiodarone to avoid long-term side effects, resolved COVID-pneumonia, hopefully will have less A. fib moving forward  Effient and Eliquis on board continue with stent thrombosis, would like to continue Effient out to 1 year given STEMI, anterior stenting, reduced flow with microvascular dysfunction  Paroxysmal atrial fibrillation continue Eliquis, stop amiodarone, gentle beta-blocker will be continued  If A. fib returns, would likely need pacemaker for tachybradycardia syndrome given higher doses of beta-blocker with significant bradycardia  Atorvastatin high intensity statin therapy as tolerated, try to increase to 40 of atorvastatin daily at a later date  Hypertension, cardiomyopathy,  continue losartan to 50 daily, home blood pressure logs 1 20-1 30 systolic    See him back in 6 months     Monitor  "high risk medicines on amiodarone           Return to clinic in 3 to 6 months for further titration of his goal-directed medical therapy    Historical data copied forward from previous encounters in EMR including the history, exam, and assessment/plan has been reviewed and is unchanged unless noted otherwise.    Cardiac medicines reviewed with risk, benefits, and necessity of each discussed.    Risk and benefit of cardiac testing reviewed including death heart attack stroke pain bleeding infection need for vascular /cardiovascular surgery were discussed and the patient     Objective:         /78 (BP Location: Left arm, Patient Position: Sitting)   Pulse 57   Resp 18   Ht 172.7 cm (68\")   Wt 88.8 kg (195 lb 12.8 oz)   BMI 29.77 kg/m²     Physical Exam    Assessment:         There are no diagnoses linked to this encounter.       Plan:              The pleasure to be involved in this patient's cardiovascular care.  Please call with any questions or concerns  Shay Mirza MD, PhD    Most recent EKG as reviewed and interpreted by me:  Procedures     Most recent echo as reviewed and interpreted by me:  Results for orders placed during the hospital encounter of 07/08/21    Adult Transthoracic Echo Complete w/ Color, Spectral and Contrast if Necessary Per Protocol    Interpretation Summary  · Mild dilation of the aortic root is present.  · Estimated left ventricular EF = 45% Estimated left ventricular EF was in disagreement with the calculated left ventricular EF. Left ventricular ejection fraction appears to be 41 - 45%. Left ventricular systolic function is low normal.  · Left ventricular diastolic function is consistent with (grade Ia w/high LAP) impaired relaxation.  · Estimated right ventricular systolic pressure from tricuspid regurgitation is normal (<35 mmHg).    Overall EF 45%  Regional abnormalities as identified consistent with LAD injury, apex, anteroapical, inferoapical, mid apical septum all " severely hypokinetic, other segments appear to contract normally  RV size and function normal  Atrial sizes grossly normal  Grade 1 diastolic dysfunction by mitral inflow pattern  No masses or effusion seen  Right atrial pressure estimated 5-8  Trace to mild MR, trace to mild TR no valvular stenoses seen  Structurally normal aortic valve in limited views, no regurgitation or stenosis by Doppler  Pulmonic valve not well seen no stenosis by Doppler  No Doppler evidence of ASD or PFO      Most recent stress test as reviewed and interpreted by me:      Most recent cardiac catheterization as reviewed interpreted by me:  Results for orders placed during the hospital encounter of 07/08/21    Cardiac Catheterization/Vascular Study    Narrative   Shay Mirza MD, PhD  [facility] cardiology  Date of service 7-9-21    Procedure  A 1 left heart catheterization coronary angiography, left ventriculography    Indication  Recurrent ST elevation status post PCI to the LAD, worsening inferior changes    I performed consent the patient brought to the catheterization lab sterilely prepped and draped in usual fashion expose the right groin for right common femoral arterial access via micropuncture modified Seldinger technique after 1% lidocaine analgesia, 6 Armenian sheath was placed the right common femoral artery ultimately upsized to 23 cm sheath secondary to iliofemoral tortuosity again.  A diagnostic JL4 JR4 catheters were used to selective left and right coronary angiography respectively, the JR4 was used across aortic valve followed by left ventriculography via hand-injection demonstrating inferoapical and inferolateral severe hypokinesis, anterior wall had some mild hypokinesis, apex and inferoapical wall was akinetic.  LVEDP was not terribly elevated at 14.  Secondary to findings of apical LAD occlusion a EBU 4.0 guide was exchanged with a diagnostic catheter, patient was heparinized with 100 units/kg of  heparin for ACT greater than 250, run-through wire was attempted to pass around the wraparound LAD apex, small subsegmental branches or cannulated repeatedly without true wraparound.  Films were reviewed from initial procedure yesterday demonstrating small threadlike wraparound LAD less than 1.5 mm in diameter.  And subsequently branches a 2.0 x 20 undeployed NC balloon was passed and retracted undeployed and attempted to restore flow, there was some mild improvement to small branches distally but wraparound LAD was never identified despite multiple wires.  Patient was hemodynamically and electrically stable in sinus rhythm, RCA, RPDA and PLV branches had YVES-3 flow, circumflex as well as all obtuse marginal branches and L PLV branch were all patent with YVES-3 flow, mid LAD stent as well as diagonal branch all widely patent with no in-stent thrombus or occlusion was seen with YVES-3 flow.  At this point of the procedure possibly with no reflow of the apex progression of MI versus distal thromboembolic event from trapped mid LAD thrombus at the site of plaque rupture and STEMI without improvement passing into play balloon in size the vessel being very small less than 1.5 mm diameter, risk versus benefit was assessed and I chose to conclude the procedure at this point.    Patient left the Cath Lab hemodynamically electrically stable or talking staff neurologically grossly intact bilaterally  Contrast usage 85 cc  Moderate conscious sedation time of 1 hour  IV Versed and fentanyl ministered by registered nurse with complete ECG pulse oximetry and hemodynamic 93 the entire to the case observed by me    Findings  1.  Opening pressure of 100/58  2.  Closing pressure at the femoral is 120/65  3.  LVEDP 14, LV function moderately to severely reduced EF estimated 35 to 40% regional abnormalities in the inferior mid to apical segments as well as akinesis of the apex, basal inferior, lateral, anteroseptal and anterior walls  move normally  4.  No transaortic valve gradient on pullback    Angiography  1.  Left main unchanged, 50 to 60% narrowing with acute angle takeoff unchanged from yesterday  2.  Proximal LAD with 40% narrowing, widely patent stent of the midportion jailing diagonal branch widely patent, diffusely regularities until the apex which was under precluded at the wraparound portion of drastically tapering LAD, likely 2 mm or less in diameter, prior films show dramatic tapering to less than 1.5 to 1 mm and wraparound LAD which supplied the inferoapical wall, diagonal branch widely patent with YVES-3 flow  3.  Circumflex widely patent including all obtuse marginal branches with OM1 and OM2 diffusely regularities, continuation circumflex and L PLV branch has 60 to 70% small caliber eccentric plaque which should be treated medically but had YVES-3 flow in all branches  4.  RCA YVES-3 flow throughout diffuse luminal regularities, ostial proximal PLV branch had 70-80 but in small caliber portion the vessel less than 1.5 mm in diameter with limited runoff, ostial RPDA 50 to 60% but in small caliber portion of the vessel but likely could be stented with 2.0 mm stent if needed but had YVES-3 flow and not culprit for current presentation    Conclusions and recommendations  1 no refill of the apical LAD versus thromboembolic event from trapped thrombus in the mid LAD, unsuccessful restoration of flow with wire and passage of undeployed balloon, very small caliber portion of the vessel  #2 Integrilin drip for 18 hours on background of aspirin and Brilinta  3 maximize goal-directed medical therapy with anteroapical infarct, Aldactone has been started in addition to low-dose beta-blocker, will evaluate for candidacy for ACE inhibitor or ARB prior to discharge blood pressure will tolerate  4.  Observe in ICU, increased risk for apical aneurysmal formation, increased risk for possible arrhythmogenic event, monitor closely    Further  recommendations based on clinical course in ICU with close monitoring secondary event despite max medical therapy    Shay Mirza MD, PhD    The following portions of the patient's history were reviewed and updated as appropriate: allergies, current medications, past family history, past medical history, past social history, past surgical history and problem list.      ROS:  14 point review of systems negative except as mentioned above    Current Outpatient Medications:   •  acetaminophen (TYLENOL) 325 MG tablet, Take 650 mg by mouth As Needed for Mild Pain ., Disp: , Rfl:   •  apixaban (ELIQUIS) 5 MG tablet tablet, Take 1 tablet by mouth Every 12 (Twelve) Hours. Indications: Atrial Fibrillation, Disp: 180 tablet, Rfl: 1  •  ascorbic acid (VITAMIN C) 1000 MG tablet, Take 1,000 mg by mouth Daily As Needed., Disp: , Rfl:   •  atorvastatin (LIPITOR) 20 MG tablet, Take 1 tablet by mouth Every Night., Disp: 30 tablet, Rfl: 5  •  losartan (Cozaar) 50 MG tablet, Take 1 tablet by mouth Daily., Disp: 90 tablet, Rfl: 1  •  metoprolol succinate XL (TOPROL-XL) 25 MG 24 hr tablet, Take 1 tablet by mouth Daily., Disp: 30 tablet, Rfl: 5  •  multivitamin with minerals tablet tablet, Take 1 tablet by mouth Every Other Day. Alternating with Vitamin B12, Disp: , Rfl:   •  nitroglycerin (Nitrostat) 0.4 MG SL tablet, Place 1 tablet under the tongue Every 5 (Five) Minutes As Needed for Chest Pain. Take no more than 3 doses in 15 minutes., Disp: 25 tablet, Rfl: 0  •  prasugrel (Effient) 10 MG tablet, take 1 tablet by mouth daily (Patient taking differently: take 1 tablet by mouth daily), Disp: 35 tablet, Rfl: 5  •  spironolactone (ALDACTONE) 25 MG tablet, Take 1 tablet by mouth Daily., Disp: 90 tablet, Rfl: 1  •  traMADol (ULTRAM) 50 MG tablet, Take 1 tablet by mouth Every 6 (Six) Hours As Needed for Moderate Pain ., Disp: 12 tablet, Rfl: 0  •  vitamin B-12 (CYANOCOBALAMIN) 1000 MCG tablet, Take 1,000 mcg by mouth Every Other Day.  Alternating with Centrum, Disp: , Rfl:   •  vitamin D3 125 MCG (5000 UT) capsule capsule, Take 5,000 Units by mouth Every Other Day., Disp: , Rfl:     Problem List:  Patient Active Problem List   Diagnosis   • ST elevation myocardial infarction (STEMI) (HCC)   • Arthritis, rheumatoid (HCC)   • Benign essential hypertension   • Encounter for screening   • Inguinal hernia, right   • Osteoarthritis of hip   • Other hyperlipidemia   • Coronary artery disease involving native coronary artery without angina pectoris   • Longstanding persistent atrial fibrillation (HCC)   • ACC/AHA stage C left heart failure with decreased ejection fraction (HCC)     Past Medical History:  Past Medical History:   Diagnosis Date   • Afib (HCC)    • CHF (congestive heart failure) (HCC)    • Coronary artery disease    • COVID-19    • Hearing loss    • Hyperlipidemia    • Hypertension      Past Surgical History:  Past Surgical History:   Procedure Laterality Date   • CARDIAC CATHETERIZATION N/A 7/8/2021    Procedure: Left Heart Cath;  Surgeon: Shay Mirza MD;  Location: [facility] CATH INVASIVE LOCATION;  Service: Cardiology;  Laterality: N/A;   • CARDIAC CATHETERIZATION N/A 7/8/2021    Procedure: Percutaneous Coronary Intervention;  Surgeon: Shay Mirza MD;  Location: [facility] CATH INVASIVE LOCATION;  Service: Cardiology;  Laterality: N/A;   • CARDIAC CATHETERIZATION N/A 7/9/2021    Procedure: Left Heart Cath;  Surgeon: Shay Mirza MD;  Location: [facility] CATH INVASIVE LOCATION;  Service: Cardiology;  Laterality: N/A;     Social History:  Social History     Socioeconomic History   • Marital status:    Tobacco Use   • Smoking status: Never Smoker   • Smokeless tobacco: Never Used   Vaping Use   • Vaping Use: Never used   Substance and Sexual Activity   • Alcohol use: Not Currently   • Drug use: Not Currently   • Sexual activity: Defer     Allergies:  No Known Allergies  Immunizations:  Immunization History    Administered Date(s) Administered   • COVID-19 (PFIZER) PURPLE CAP 11/15/2021, 12/17/2021            In-Office Procedure(s):  No orders to display        ASCVD RIsk Score::  The ASCVD Risk score (Raymondmauricio SPARKS Jr., et al., 2013) failed to calculate for the following reasons:    The patient has a prior MI or stroke diagnosis    Imaging:    Results for orders placed during the hospital encounter of 09/16/21    XR Chest 1 View    Narrative  DATE OF EXAM:  9/16/2021 7:03 AM    PROCEDURE:  XR CHEST 1 VW-    INDICATIONS:  Chest pain protocol    COMPARISON:  8/15/2021    TECHNIQUE:  Single radiographic view of the chest was obtained.    FINDINGS:  There is irregular groundglass and consolidative opacity within the left  lung base. There is also patchy opacity in the right medial lung base.  Cardiomediastinal contours within normal limits. No evidence of  pneumothorax. Regional skeleton is unremarkable.    Impression  1. Left greater than right multifocal airspace opacity, concerning for  multifocal pneumonia, including Covid 19 pneumonia.    Electronically Signed By-Efrem Rodriguez MD On:9/16/2021 7:57 AM  This report was finalized on [id number] by  Efrem Rodriguez MD.       Results for orders placed during the hospital encounter of 09/16/21    CT Chest Pulmonary Embolism    Narrative  CT CHEST PULMONARY EMBOLISM-    Date of Exam: 9/16/2021 7:42 AM    Indication: PE suspected, low/intermediate prob, positive D-dimer.  Left-sided chest pain. Elevated d-dimer.    Comparison: Chest radiograph 9/16/2021    Technique: Serial and axial CT images of the chest were obtained  following the uneventful intravenous administration of 100 cc Isovue-370  contrast. Reconstructions in the coronal and sagittal planes were also  performed.  Automated exposure control and iterative reconstruction  methods were used.    FINDINGS:    Pulmonary Arteries: Excellent contrast opacification of the pulmonary  arteries.  No intraluminal filling defects to  suggest pulmonary embolus.  The pulmonary arteries are normal in caliber.      Hilum and Mediastinum: There are no pathologically enlarged hilar  mediastinal lymph nodes. There is cardiomegaly. There is a coronary  stent. No pericardial effusion.    Lung Parenchyma and Pleura: There are small bilateral pleural effusions.  There is multifocal consolidation in both lower lobes and in the  posterior left upper lobe and lingula. Septal thickening. There is a  pleural-based nodule in the posterior right upper lobe on image number  51. It measures 7 mm.    Upper Abdomen: Unremarkable.    Soft tissues: Unremarkable.    Osseous structures: There is degenerative change with anterior bridging  osteophytes. There are degenerative changes at costovertebral junction  inferiorly. Correlate for possible ankylosing spondylitis. No aggressive  focal lytic or sclerotic osseous lesions.    Impression  No findings of pulmonary embolus.  Multifocal airspace opacity consistent multifocal pneumonia. COVID 19  pneumonia most likely.  Small pleural effusions indicated could be a component of pulmonary  edema.  Cardiomegaly.        Electronically Signed By-Kenia Santizo MD On:9/16/2021 8:02 AM  This report was finalized on [id number] by  Kenia Santizo MD.      Results for orders placed during the hospital encounter of 09/16/21    CT Chest Pulmonary Embolism    Narrative  CT CHEST PULMONARY EMBOLISM-    Date of Exam: 9/16/2021 7:42 AM    Indication: PE suspected, low/intermediate prob, positive D-dimer.  Left-sided chest pain. Elevated d-dimer.    Comparison: Chest radiograph 9/16/2021    Technique: Serial and axial CT images of the chest were obtained  following the uneventful intravenous administration of 100 cc Isovue-370  contrast. Reconstructions in the coronal and sagittal planes were also  performed.  Automated exposure control and iterative reconstruction  methods were used.    FINDINGS:    Pulmonary Arteries: Excellent contrast  opacification of the pulmonary  arteries.  No intraluminal filling defects to suggest pulmonary embolus.  The pulmonary arteries are normal in caliber.      Hilum and Mediastinum: There are no pathologically enlarged hilar  mediastinal lymph nodes. There is cardiomegaly. There is a coronary  stent. No pericardial effusion.    Lung Parenchyma and Pleura: There are small bilateral pleural effusions.  There is multifocal consolidation in both lower lobes and in the  posterior left upper lobe and lingula. Septal thickening. There is a  pleural-based nodule in the posterior right upper lobe on image number  51. It measures 7 mm.    Upper Abdomen: Unremarkable.    Soft tissues: Unremarkable.    Osseous structures: There is degenerative change with anterior bridging  osteophytes. There are degenerative changes at costovertebral junction  inferiorly. Correlate for possible ankylosing spondylitis. No aggressive  focal lytic or sclerotic osseous lesions.    Impression  No findings of pulmonary embolus.  Multifocal airspace opacity consistent multifocal pneumonia. COVID 19  pneumonia most likely.  Small pleural effusions indicated could be a component of pulmonary  edema.  Cardiomegaly.        Electronically Signed By-Kenia Santizo MD On:9/16/2021 8:02 AM  This report was finalized on [id number] by  Kenia Santizo MD.      Lab Review:   Lab on 05/31/2022   Component Date Value   • Glucose 05/31/2022 115 (A)   • BUN 05/31/2022 15    • Creatinine 05/31/2022 1.04    • Sodium 05/31/2022 140    • Potassium 05/31/2022 4.4    • Chloride 05/31/2022 102    • CO2 05/31/2022 26.0    • Calcium 05/31/2022 9.3    • Total Protein 05/31/2022 7.0    • Albumin 05/31/2022 4.70    • ALT (SGPT) 05/31/2022 19    • AST (SGOT) 05/31/2022 23    • Alkaline Phosphatase 05/31/2022 72    • Total Bilirubin 05/31/2022 0.8    • Globulin 05/31/2022 2.3    • A/G Ratio 05/31/2022 2.0    • BUN/Creatinine Ratio 05/31/2022 14.4    • Anion Gap 05/31/2022 12.0    •  eGFR 05/31/2022 73.5    • TSH 05/31/2022 1.620      Recent labs reviewed and interpreted for clinical significance and application            Level of Care:           Shay Mirza MD  06/08/22  .

## 2022-06-20 RX ORDER — LOSARTAN POTASSIUM 50 MG/1
50 TABLET ORAL DAILY
Qty: 90 TABLET | Refills: 1 | Status: SHIPPED | OUTPATIENT
Start: 2022-06-20 | End: 2022-12-21 | Stop reason: SDUPTHER

## 2022-06-20 RX ORDER — LOSARTAN POTASSIUM 50 MG/1
50 TABLET ORAL DAILY
Qty: 90 TABLET | Refills: 1 | Status: CANCELLED | OUTPATIENT
Start: 2022-06-20

## 2022-07-26 RX ORDER — SPIRONOLACTONE 25 MG/1
25 TABLET ORAL DAILY
Qty: 90 TABLET | Refills: 1 | Status: SHIPPED | OUTPATIENT
Start: 2022-07-26 | End: 2023-01-22 | Stop reason: SDUPTHER

## 2022-07-26 RX ORDER — SPIRONOLACTONE 25 MG/1
25 TABLET ORAL DAILY
Qty: 90 TABLET | Refills: 1 | Status: CANCELLED | OUTPATIENT
Start: 2022-07-26

## 2022-09-01 RX ORDER — PRASUGREL 10 MG/1
TABLET, FILM COATED ORAL
Qty: 35 TABLET | Refills: 5 | Status: SHIPPED | OUTPATIENT
Start: 2022-09-01 | End: 2022-11-30

## 2022-09-01 RX ORDER — ATORVASTATIN CALCIUM 20 MG/1
20 TABLET, FILM COATED ORAL NIGHTLY
Qty: 30 TABLET | Refills: 5 | Status: SHIPPED | OUTPATIENT
Start: 2022-09-01

## 2022-09-01 RX ORDER — METOPROLOL SUCCINATE 25 MG/1
25 TABLET, EXTENDED RELEASE ORAL DAILY
Qty: 30 TABLET | Refills: 5 | Status: SHIPPED | OUTPATIENT
Start: 2022-09-01 | End: 2023-01-09 | Stop reason: SDUPTHER

## 2022-11-30 ENCOUNTER — OFFICE VISIT (OUTPATIENT)
Dept: CARDIOLOGY | Facility: CLINIC | Age: 77
End: 2022-11-30

## 2022-11-30 ENCOUNTER — OFFICE VISIT (OUTPATIENT)
Dept: FAMILY MEDICINE CLINIC | Facility: CLINIC | Age: 77
End: 2022-11-30

## 2022-11-30 VITALS
WEIGHT: 191 LBS | HEART RATE: 69 BPM | BODY MASS INDEX: 28.95 KG/M2 | HEIGHT: 68 IN | TEMPERATURE: 97.9 F | DIASTOLIC BLOOD PRESSURE: 73 MMHG | OXYGEN SATURATION: 97 % | SYSTOLIC BLOOD PRESSURE: 137 MMHG

## 2022-11-30 VITALS
SYSTOLIC BLOOD PRESSURE: 141 MMHG | HEART RATE: 60 BPM | HEIGHT: 68 IN | RESPIRATION RATE: 18 BRPM | BODY MASS INDEX: 28.95 KG/M2 | WEIGHT: 191 LBS | DIASTOLIC BLOOD PRESSURE: 75 MMHG

## 2022-11-30 DIAGNOSIS — I10 BENIGN ESSENTIAL HYPERTENSION: Chronic | ICD-10-CM

## 2022-11-30 DIAGNOSIS — I25.10 CORONARY ARTERY DISEASE INVOLVING NATIVE CORONARY ARTERY OF NATIVE HEART WITHOUT ANGINA PECTORIS: ICD-10-CM

## 2022-11-30 DIAGNOSIS — I21.02 ST ELEVATION MYOCARDIAL INFARCTION INVOLVING LEFT ANTERIOR DESCENDING (LAD) CORONARY ARTERY: ICD-10-CM

## 2022-11-30 DIAGNOSIS — I25.10 CORONARY ARTERY DISEASE INVOLVING NATIVE CORONARY ARTERY OF NATIVE HEART WITHOUT ANGINA PECTORIS: Chronic | ICD-10-CM

## 2022-11-30 DIAGNOSIS — E78.49 OTHER HYPERLIPIDEMIA: Chronic | ICD-10-CM

## 2022-11-30 DIAGNOSIS — I10 BENIGN ESSENTIAL HYPERTENSION: ICD-10-CM

## 2022-11-30 DIAGNOSIS — I10 PRIMARY HYPERTENSION: Primary | ICD-10-CM

## 2022-11-30 DIAGNOSIS — I48.11 LONGSTANDING PERSISTENT ATRIAL FIBRILLATION: Primary | Chronic | ICD-10-CM

## 2022-11-30 DIAGNOSIS — I48.11 LONGSTANDING PERSISTENT ATRIAL FIBRILLATION: ICD-10-CM

## 2022-11-30 DIAGNOSIS — M06.9 RHEUMATOID ARTHRITIS, INVOLVING UNSPECIFIED SITE, UNSPECIFIED WHETHER RHEUMATOID FACTOR PRESENT: Chronic | ICD-10-CM

## 2022-11-30 PROCEDURE — 99214 OFFICE O/P EST MOD 30 MIN: CPT | Performed by: FAMILY MEDICINE

## 2022-11-30 PROCEDURE — 99214 OFFICE O/P EST MOD 30 MIN: CPT | Performed by: INTERNAL MEDICINE

## 2022-11-30 RX ORDER — CLOPIDOGREL BISULFATE 75 MG/1
75 TABLET ORAL DAILY
Qty: 90 TABLET | Refills: 3 | Status: SHIPPED | OUTPATIENT
Start: 2022-11-30

## 2022-11-30 RX ORDER — NITROGLYCERIN 0.4 MG/1
0.4 TABLET SUBLINGUAL
Qty: 25 TABLET | Refills: 3 | Status: SHIPPED | OUTPATIENT
Start: 2022-11-30

## 2022-11-30 NOTE — PATIENT INSTRUCTIONS
Continue current medications and treatment.    Have the follow-up labs done and call for results.    Follow-up in 6 months.

## 2022-11-30 NOTE — PROGRESS NOTES
"Subjective   Matheus Jolly is a 77 y.o. male.     Chief Complaint   Patient presents with   • Coronary Artery Disease   • Hyperlipidemia   • Hypertension     6 MONTH F/U       HPI  Chief complaint: Hypertension hyperlipidemia coronary artery disease    Patient is a 77-year-old white male comes in for follow-up and maintenance of his current problems which include    1.  Hyperlipidemia-stable-patient on Lipitor 20 mg a day.  He denies myalgias and arthralgias.    2.  Hypertension-stable-patient is on Cozaar 50 mg daily and metoprolol 25 mg daily.  He is also on Aldactone 25 mg daily.  Blood pressure stable.    3.  Coronary artery disease/status post coronary intervention-stable-patient is on Effient 10 mg daily and metoprolol.  Denies chest pain shortness of breath orthopnea or PND.    4.  Atrial fibrillation-stable- patient is on Eliquis 5 mg twice a day and metoprolol.  He denied episodes of rapid or slow heart rhythm.    5.  Rheumatoid arthritis-stable-the patient is on no medication.  His arthritis is in remission.        The following portions of the patient's history were reviewed and updated as appropriate: allergies, current medications, past family history, past medical history, past social history, past surgical history and problem list.    Review of Systems    Objective     /73   Pulse 69   Temp 97.9 °F (36.6 °C) (Infrared)   Ht 172.7 cm (68\")   Wt 86.6 kg (191 lb)   SpO2 97%   BMI 29.04 kg/m²     Physical Exam  Vitals and nursing note reviewed.   Constitutional:       Appearance: He is well-developed.   HENT:      Head: Normocephalic and atraumatic.   Eyes:      Pupils: Pupils are equal, round, and reactive to light.   Cardiovascular:      Rate and Rhythm: Normal rate and regular rhythm.      Pulses: Normal pulses.      Heart sounds: Normal heart sounds. No murmur heard.    No friction rub. No gallop.   Pulmonary:      Effort: Pulmonary effort is normal.      Breath sounds: Normal breath " sounds.   Abdominal:      General: Bowel sounds are normal.      Palpations: Abdomen is soft.   Musculoskeletal:         General: Normal range of motion.      Cervical back: Neck supple.   Skin:     General: Skin is warm and dry.   Neurological:      Mental Status: He is alert and oriented to person, place, and time.   Psychiatric:         Behavior: Behavior normal.         Thought Content: Thought content normal.         Judgment: Judgment normal.           Assessment & Plan   Diagnoses and all orders for this visit:    1. Longstanding persistent atrial fibrillation (HCC) (Primary)  -     Basic Metabolic Panel; Future    2. Coronary artery disease involving native coronary artery of native heart without angina pectoris  -     ALT; Future  -     Lipid Panel; Future    3. Other hyperlipidemia    4. Benign essential hypertension    5. Rheumatoid arthritis, involving unspecified site, unspecified whether rheumatoid factor present (HCC)    Other orders  -     nitroglycerin (Nitrostat) 0.4 MG SL tablet; Place 1 tablet under the tongue Every 5 (Five) Minutes As Needed for Chest Pain. Take no more than 3 doses in 15 minutes.  Dispense: 25 tablet; Refill: 3      Patient Instructions   Continue current medications and treatment.    Have the follow-up labs done and call for results.    Follow-up in 6 months.      Ho Ro Jr., MD    11/30/22

## 2022-12-02 NOTE — PROGRESS NOTES
Cardiology Clinic Note  Shay Mirza MD, PhD    Subjective:     Encounter Date:11/30/2022      Patient ID: Matheus Jolly is a 77 y.o. male.    Chief Complaint:  Chief Complaint   Patient presents with   • Follow-up       HPI:       Previously I had the pleasure to see this 77-year-old gentleman in clinic for follow-up, last year 2021 he was hospitalization with anterior STEMI, Xience drug-eluting stent placement to the mid to proximal LAD, post procedurally either had thromboembolic event to the apical LAD versus microvascular occlusion at the apex, he was ultimately taken back to the Cath Lab a second time with ST elevations and Q waves throughout the inferior leads with large wraparound LAD with no reflow.  Balloon was passed except there was no reflow on second procedure.  Revascularization the small caliber wraparound LAD was abandoned ultimately his EF was 40%.  He had no cardiac arrhythmias, there was preservation of the basal anterior, mid anterolateral and all other segments other than the apex and apical inferior wall which were akinetic.  He had some periprocedural and hospitalization atrial fibrillation was placed on Eliquis for anticoagulation.  He remains on Effient and Eliquis.  There is no bleeding.  He denies any anginal chest pain, he is on statin therapy and no beta-blocker secondary to prior bradycardia heart rate 60s presently.  He is otherwise doing well with no peripheral edema, NYHA class II shortness of air, CCS class I.  He is well compensated and on goal-directed medical therapy     Presents back otherwise doing well with no heart failure signs or symptoms, he had  Covid pneumonia  earlier this year on top of his coronary disease exacerbating his chest pain likely with demand ischemia with occluded apical LAD despite antiplatelet therapy.  His EF is around 40% chronically, as he is NYHA class II-III with respect to dyspnea on exertion but no swelling peripherally.  He is compliant with  all medicines and no other complaints.  No volume overload. No recurrence of any atrial fibrillation, blood pressure is 1 20-1 35 systolic .  He is on max tolerated great medicines that are guideline indicated  No   Review of systems negative x14 point review of systems except was mentioned above     Historical data copied forward from previous encounters in EMR is unchanged     Assessment and plan per my encounter today  CAD status post anterior STEMI EF 40%  Ischemic or myopathy  Systolic heart failure EF 40%, presently compensated  Essential hypertension  Paroxysmal atrial fibrillation     Plan today  Remain off amiodarone to avoid long-term side effects, resolved COVID-pneumonia earlier 2022, hopefully will have less A. fib moving forward  Effient will be changed to Plavix with continuation of Eliquis on board continue with stent thrombosis, would like to continue P2 Y 12 inhibitor with Plavix out to 1 year given STEMI, anterior stenting, reduced flow with microvascular dysfunction  Paroxysmal atrial fibrillation continue Eliquis, stop amiodarone,gentle beta-blocker will be continued  If A. fib returns, would likely need pacemaker for tachybradycardia syndrome given higher doses of beta-blocker with significant bradycardia  Atorvastatin high intensity statin therapy as tolerated, continue 40 of atorvastatin daily  Hypertension, cardiomyopathy,  continue losartan to 50 daily, home blood pressure logs 1 20-1 30 systolic     See him back in 6 months     Monitor high risk medicines on amiodarone           Return to clinic in 3 to 6 months for further titration of his goal-directed medical therapy       Historical data copied forward from previous encounters in EMR including the history, exam, and assessment/plan has been reviewed and is unchanged unless noted otherwise.    Cardiac medicines reviewed with risk, benefits, and necessity of each discussed.    Risk and benefit of cardiac testing reviewed including death  "heart attack stroke pain bleeding infection need for vascular /cardiovascular surgery were discussed and the patient     Objective:         /75 (BP Location: Left arm, Patient Position: Sitting)   Pulse 60   Resp 18   Ht 172.7 cm (68\")   Wt 86.6 kg (191 lb)   BMI 29.04 kg/m²     Physical Exam    Assessment:         There are no diagnoses linked to this encounter.       Plan:              The pleasure to be involved in this patient's cardiovascular care.  Please call with any questions or concerns  Shay Mirza MD, PhD    Most recent EKG as reviewed and interpreted by me:  Procedures     Most recent echo as reviewed and interpreted by me:  Results for orders placed during the hospital encounter of 07/08/21    Adult Transthoracic Echo Complete w/ Color, Spectral and Contrast if Necessary Per Protocol    Interpretation Summary  · Mild dilation of the aortic root is present.  · Estimated left ventricular EF = 45% Estimated left ventricular EF was in disagreement with the calculated left ventricular EF. Left ventricular ejection fraction appears to be 41 - 45%. Left ventricular systolic function is low normal.  · Left ventricular diastolic function is consistent with (grade Ia w/high LAP) impaired relaxation.  · Estimated right ventricular systolic pressure from tricuspid regurgitation is normal (<35 mmHg).    Overall EF 45%  Regional abnormalities as identified consistent with LAD injury, apex, anteroapical, inferoapical, mid apical septum all severely hypokinetic, other segments appear to contract normally  RV size and function normal  Atrial sizes grossly normal  Grade 1 diastolic dysfunction by mitral inflow pattern  No masses or effusion seen  Right atrial pressure estimated 5-8  Trace to mild MR, trace to mild TR no valvular stenoses seen  Structurally normal aortic valve in limited views, no regurgitation or stenosis by Doppler  Pulmonic valve not well seen no stenosis by Doppler  No Doppler evidence " of ASD or PFO      Most recent stress test as reviewed and interpreted by me:      Most recent cardiac catheterization as reviewed interpreted by me:  Results for orders placed during the hospital encounter of 07/08/21    Cardiac Catheterization/Vascular Study    Narrative   Shay Mriza MD, PhD  [facility] cardiology  Date of service 7-9-21    Procedure  A 1 left heart catheterization coronary angiography, left ventriculography    Indication  Recurrent ST elevation status post PCI to the LAD, worsening inferior changes    I performed consent the patient brought to the catheterization lab sterilely prepped and draped in usual fashion expose the right groin for right common femoral arterial access via micropuncture modified Seldinger technique after 1% lidocaine analgesia, 6 Wolof sheath was placed the right common femoral artery ultimately upsized to 23 cm sheath secondary to iliofemoral tortuosity again.  A diagnostic JL4 JR4 catheters were used to selective left and right coronary angiography respectively, the JR4 was used across aortic valve followed by left ventriculography via hand-injection demonstrating inferoapical and inferolateral severe hypokinesis, anterior wall had some mild hypokinesis, apex and inferoapical wall was akinetic.  LVEDP was not terribly elevated at 14.  Secondary to findings of apical LAD occlusion a EBU 4.0 guide was exchanged with a diagnostic catheter, patient was heparinized with 100 units/kg of heparin for ACT greater than 250, run-through wire was attempted to pass around the wraparound LAD apex, small subsegmental branches or cannulated repeatedly without true wraparound.  Films were reviewed from initial procedure yesterday demonstrating small threadlike wraparound LAD less than 1.5 mm in diameter.  And subsequently branches a 2.0 x 20 undeployed NC balloon was passed and retracted undeployed and attempted to restore flow, there was some mild improvement to  small branches distally but wraparound LAD was never identified despite multiple wires.  Patient was hemodynamically and electrically stable in sinus rhythm, RCA, RPDA and PLV branches had YVES-3 flow, circumflex as well as all obtuse marginal branches and L PLV branch were all patent with YVES-3 flow, mid LAD stent as well as diagonal branch all widely patent with no in-stent thrombus or occlusion was seen with YVES-3 flow.  At this point of the procedure possibly with no reflow of the apex progression of MI versus distal thromboembolic event from trapped mid LAD thrombus at the site of plaque rupture and STEMI without improvement passing into play balloon in size the vessel being very small less than 1.5 mm diameter, risk versus benefit was assessed and I chose to conclude the procedure at this point.    Patient left the Cath Lab hemodynamically electrically stable or talking staff neurologically grossly intact bilaterally  Contrast usage 85 cc  Moderate conscious sedation time of 1 hour  IV Versed and fentanyl ministered by registered nurse with complete ECG pulse oximetry and hemodynamic 93 the entire to the case observed by me    Findings  1.  Opening pressure of 100/58  2.  Closing pressure at the femoral is 120/65  3.  LVEDP 14, LV function moderately to severely reduced EF estimated 35 to 40% regional abnormalities in the inferior mid to apical segments as well as akinesis of the apex, basal inferior, lateral, anteroseptal and anterior walls move normally  4.  No transaortic valve gradient on pullback    Angiography  1.  Left main unchanged, 50 to 60% narrowing with acute angle takeoff unchanged from yesterday  2.  Proximal LAD with 40% narrowing, widely patent stent of the midportion jailing diagonal branch widely patent, diffusely regularities until the apex which was under precluded at the wraparound portion of drastically tapering LAD, likely 2 mm or less in diameter, prior films show dramatic tapering  to less than 1.5 to 1 mm and wraparound LAD which supplied the inferoapical wall, diagonal branch widely patent with YVES-3 flow  3.  Circumflex widely patent including all obtuse marginal branches with OM1 and OM2 diffusely regularities, continuation circumflex and L PLV branch has 60 to 70% small caliber eccentric plaque which should be treated medically but had YVES-3 flow in all branches  4.  RCA YVES-3 flow throughout diffuse luminal regularities, ostial proximal PLV branch had 70-80 but in small caliber portion the vessel less than 1.5 mm in diameter with limited runoff, ostial RPDA 50 to 60% but in small caliber portion of the vessel but likely could be stented with 2.0 mm stent if needed but had YVES-3 flow and not culprit for current presentation    Conclusions and recommendations  1 no refill of the apical LAD versus thromboembolic event from trapped thrombus in the mid LAD, unsuccessful restoration of flow with wire and passage of undeployed balloon, very small caliber portion of the vessel  #2 Integrilin drip for 18 hours on background of aspirin and Brilinta  3 maximize goal-directed medical therapy with anteroapical infarct, Aldactone has been started in addition to low-dose beta-blocker, will evaluate for candidacy for ACE inhibitor or ARB prior to discharge blood pressure will tolerate  4.  Observe in ICU, increased risk for apical aneurysmal formation, increased risk for possible arrhythmogenic event, monitor closely    Further recommendations based on clinical course in ICU with close monitoring secondary event despite max medical therapy    Shay Mirza MD, PhD    The following portions of the patient's history were reviewed and updated as appropriate: allergies, current medications, past family history, past medical history, past social history, past surgical history and problem list.      ROS:  14 point review of systems negative except as mentioned above    Current Outpatient Medications:   •   acetaminophen (TYLENOL) 325 MG tablet, Take 650 mg by mouth As Needed for Mild Pain ., Disp: , Rfl:   •  apixaban (ELIQUIS) 5 MG tablet tablet, Take 1 tablet by mouth Every 12 (Twelve) Hours. Indications: Atrial Fibrillation, Disp: 180 tablet, Rfl: 1  •  atorvastatin (LIPITOR) 20 MG tablet, Take 1 tablet by mouth Every Night., Disp: 30 tablet, Rfl: 5  •  losartan (Cozaar) 50 MG tablet, Take 1 tablet by mouth Daily., Disp: 90 tablet, Rfl: 1  •  metoprolol succinate XL (TOPROL-XL) 25 MG 24 hr tablet, Take 1 tablet by mouth Daily., Disp: 30 tablet, Rfl: 5  •  multivitamin with minerals tablet tablet, Take 1 tablet by mouth Every Other Day. Alternating with Vitamin B12, Disp: , Rfl:   •  nitroglycerin (Nitrostat) 0.4 MG SL tablet, Place 1 tablet under the tongue Every 5 (Five) Minutes As Needed for Chest Pain. Take no more than 3 doses in 15 minutes., Disp: 25 tablet, Rfl: 3  •  spironolactone (ALDACTONE) 25 MG tablet, Take 1 tablet by mouth Daily., Disp: 90 tablet, Rfl: 1  •  vitamin B-12 (CYANOCOBALAMIN) 1000 MCG tablet, Take 1,000 mcg by mouth Every Other Day. Alternating with Centrum, Disp: , Rfl:   •  clopidogrel (PLAVIX) 75 MG tablet, Take 1 tablet by mouth Daily., Disp: 90 tablet, Rfl: 3    Problem List:  Patient Active Problem List   Diagnosis   • ST elevation myocardial infarction (STEMI) (HCC)   • Arthritis, rheumatoid (HCC)   • Benign essential hypertension   • Encounter for screening   • Inguinal hernia, right   • Osteoarthritis of hip   • Other hyperlipidemia   • Coronary artery disease involving native coronary artery without angina pectoris   • Longstanding persistent atrial fibrillation (HCC)   • ACC/AHA stage C left heart failure with decreased ejection fraction (HCC)     Past Medical History:  Past Medical History:   Diagnosis Date   • Afib (HCC)    • CHF (congestive heart failure) (HCC)    • Coronary artery disease    • COVID-19    • Hearing loss    • Hyperlipidemia    • Hypertension      Past  Surgical History:  Past Surgical History:   Procedure Laterality Date   • CARDIAC CATHETERIZATION N/A 7/8/2021    Procedure: Left Heart Cath;  Surgeon: Shay Mirza MD;  Location: [facility] CATH INVASIVE LOCATION;  Service: Cardiology;  Laterality: N/A;   • CARDIAC CATHETERIZATION N/A 7/8/2021    Procedure: Percutaneous Coronary Intervention;  Surgeon: Shay Mirza MD;  Location:  [facility] CATH INVASIVE LOCATION;  Service: Cardiology;  Laterality: N/A;   • CARDIAC CATHETERIZATION N/A 7/9/2021    Procedure: Left Heart Cath;  Surgeon: Shay Mirza MD;  Location: [facility] CATH INVASIVE LOCATION;  Service: Cardiology;  Laterality: N/A;     Social History:  Social History     Socioeconomic History   • Marital status:    Tobacco Use   • Smoking status: Never   • Smokeless tobacco: Never   Vaping Use   • Vaping Use: Never used   Substance and Sexual Activity   • Alcohol use: Not Currently   • Drug use: Not Currently   • Sexual activity: Defer     Allergies:  No Known Allergies  Immunizations:  Immunization History   Administered Date(s) Administered   • COVID-19 (PFIZER) PURPLE CAP 11/15/2021, 12/17/2021            In-Office Procedure(s):  No orders to display        ASCVD RIsk Score::  The ASCVD Risk score (Lidia DK, et al., 2019) failed to calculate for the following reasons:    The patient has a prior MI or stroke diagnosis    Imaging:    Results for orders placed during the hospital encounter of 09/16/21    XR Chest 1 View    Narrative  DATE OF EXAM:  9/16/2021 7:03 AM    PROCEDURE:  XR CHEST 1 VW-    INDICATIONS:  Chest pain protocol    COMPARISON:  8/15/2021    TECHNIQUE:  Single radiographic view of the chest was obtained.    FINDINGS:  There is irregular groundglass and consolidative opacity within the left  lung base. There is also patchy opacity in the right medial lung base.  Cardiomediastinal contours within normal limits. No evidence of  pneumothorax. Regional skeleton is  unremarkable.    Impression  1. Left greater than right multifocal airspace opacity, concerning for  multifocal pneumonia, including Covid 19 pneumonia.    Electronically Signed By-Efrem Rodriguez MD On:9/16/2021 7:57 AM  This report was finalized on [id number] by  Efrem Rodriguez MD.       Results for orders placed during the hospital encounter of 09/16/21    CT Chest Pulmonary Embolism    Narrative  CT CHEST PULMONARY EMBOLISM-    Date of Exam: 9/16/2021 7:42 AM    Indication: PE suspected, low/intermediate prob, positive D-dimer.  Left-sided chest pain. Elevated d-dimer.    Comparison: Chest radiograph 9/16/2021    Technique: Serial and axial CT images of the chest were obtained  following the uneventful intravenous administration of 100 cc Isovue-370  contrast. Reconstructions in the coronal and sagittal planes were also  performed.  Automated exposure control and iterative reconstruction  methods were used.    FINDINGS:    Pulmonary Arteries: Excellent contrast opacification of the pulmonary  arteries.  No intraluminal filling defects to suggest pulmonary embolus.  The pulmonary arteries are normal in caliber.      Hilum and Mediastinum: There are no pathologically enlarged hilar  mediastinal lymph nodes. There is cardiomegaly. There is a coronary  stent. No pericardial effusion.    Lung Parenchyma and Pleura: There are small bilateral pleural effusions.  There is multifocal consolidation in both lower lobes and in the  posterior left upper lobe and lingula. Septal thickening. There is a  pleural-based nodule in the posterior right upper lobe on image number  51. It measures 7 mm.    Upper Abdomen: Unremarkable.    Soft tissues: Unremarkable.    Osseous structures: There is degenerative change with anterior bridging  osteophytes. There are degenerative changes at costovertebral junction  inferiorly. Correlate for possible ankylosing spondylitis. No aggressive  focal lytic or sclerotic osseous  lesions.    Impression  No findings of pulmonary embolus.  Multifocal airspace opacity consistent multifocal pneumonia. COVID 19  pneumonia most likely.  Small pleural effusions indicated could be a component of pulmonary  edema.  Cardiomegaly.        Electronically Signed By-Kenia Santizo MD On:9/16/2021 8:02 AM  This report was finalized on [id number] by  Kenia Santizo MD.      Results for orders placed during the hospital encounter of 09/16/21    CT Chest Pulmonary Embolism    Narrative  CT CHEST PULMONARY EMBOLISM-    Date of Exam: 9/16/2021 7:42 AM    Indication: PE suspected, low/intermediate prob, positive D-dimer.  Left-sided chest pain. Elevated d-dimer.    Comparison: Chest radiograph 9/16/2021    Technique: Serial and axial CT images of the chest were obtained  following the uneventful intravenous administration of 100 cc Isovue-370  contrast. Reconstructions in the coronal and sagittal planes were also  performed.  Automated exposure control and iterative reconstruction  methods were used.    FINDINGS:    Pulmonary Arteries: Excellent contrast opacification of the pulmonary  arteries.  No intraluminal filling defects to suggest pulmonary embolus.  The pulmonary arteries are normal in caliber.      Hilum and Mediastinum: There are no pathologically enlarged hilar  mediastinal lymph nodes. There is cardiomegaly. There is a coronary  stent. No pericardial effusion.    Lung Parenchyma and Pleura: There are small bilateral pleural effusions.  There is multifocal consolidation in both lower lobes and in the  posterior left upper lobe and lingula. Septal thickening. There is a  pleural-based nodule in the posterior right upper lobe on image number  51. It measures 7 mm.    Upper Abdomen: Unremarkable.    Soft tissues: Unremarkable.    Osseous structures: There is degenerative change with anterior bridging  osteophytes. There are degenerative changes at costovertebral junction  inferiorly. Correlate for possible  ankylosing spondylitis. No aggressive  focal lytic or sclerotic osseous lesions.    Impression  No findings of pulmonary embolus.  Multifocal airspace opacity consistent multifocal pneumonia. COVID 19  pneumonia most likely.  Small pleural effusions indicated could be a component of pulmonary  edema.  Cardiomegaly.        Electronically Signed By-Kenia Santizo MD On:9/16/2021 8:02 AM  This report was finalized on [id number] by  Kenia Santizo MD.      Lab Review:   No visits with results within 6 Month(s) from this visit.   Latest known visit with results is:   Lab on 05/31/2022   Component Date Value   • Glucose 05/31/2022 115 (H)    • BUN 05/31/2022 15    • Creatinine 05/31/2022 1.04    • Sodium 05/31/2022 140    • Potassium 05/31/2022 4.4    • Chloride 05/31/2022 102    • CO2 05/31/2022 26.0    • Calcium 05/31/2022 9.3    • Total Protein 05/31/2022 7.0    • Albumin 05/31/2022 4.70    • ALT (SGPT) 05/31/2022 19    • AST (SGOT) 05/31/2022 23    • Alkaline Phosphatase 05/31/2022 72    • Total Bilirubin 05/31/2022 0.8    • Globulin 05/31/2022 2.3    • A/G Ratio 05/31/2022 2.0    • BUN/Creatinine Ratio 05/31/2022 14.4    • Anion Gap 05/31/2022 12.0    • eGFR 05/31/2022 73.5    • TSH 05/31/2022 1.620      Recent labs reviewed and interpreted for clinical significance and application            Level of Care:           Shay Mirza MD  12/02/22  .

## 2022-12-21 RX ORDER — LOSARTAN POTASSIUM 50 MG/1
50 TABLET ORAL DAILY
Qty: 90 TABLET | Refills: 1 | Status: CANCELLED | OUTPATIENT
Start: 2022-12-21

## 2022-12-21 RX ORDER — LOSARTAN POTASSIUM 50 MG/1
50 TABLET ORAL DAILY
Qty: 90 TABLET | Refills: 1 | Status: SHIPPED | OUTPATIENT
Start: 2022-12-21

## 2023-01-09 RX ORDER — METOPROLOL SUCCINATE 25 MG/1
25 TABLET, EXTENDED RELEASE ORAL DAILY
Qty: 30 TABLET | Refills: 5 | Status: SHIPPED | OUTPATIENT
Start: 2023-01-09

## 2023-01-09 RX ORDER — METOPROLOL SUCCINATE 25 MG/1
25 TABLET, EXTENDED RELEASE ORAL DAILY
Qty: 30 TABLET | Refills: 5 | Status: CANCELLED | OUTPATIENT
Start: 2023-01-09

## 2023-01-22 RX ORDER — SPIRONOLACTONE 25 MG/1
25 TABLET ORAL DAILY
Qty: 90 TABLET | Refills: 1 | Status: CANCELLED | OUTPATIENT
Start: 2023-01-22

## 2023-01-23 RX ORDER — SPIRONOLACTONE 25 MG/1
25 TABLET ORAL DAILY
Qty: 90 TABLET | Refills: 1 | Status: SHIPPED | OUTPATIENT
Start: 2023-01-23

## 2023-03-10 ENCOUNTER — LAB (OUTPATIENT)
Dept: LAB | Facility: HOSPITAL | Age: 78
End: 2023-03-10
Payer: COMMERCIAL

## 2023-03-10 DIAGNOSIS — I25.10 CORONARY ARTERY DISEASE INVOLVING NATIVE CORONARY ARTERY OF NATIVE HEART WITHOUT ANGINA PECTORIS: Chronic | ICD-10-CM

## 2023-03-10 LAB
CHOLEST SERPL-MCNC: 114 MG/DL (ref 0–200)
HDLC SERPL-MCNC: 33 MG/DL (ref 40–60)
LDLC SERPL CALC-MCNC: 63 MG/DL (ref 0–100)
LDLC/HDLC SERPL: 1.87 {RATIO}
TRIGL SERPL-MCNC: 96 MG/DL (ref 0–150)
VLDLC SERPL-MCNC: 18 MG/DL (ref 5–40)

## 2023-03-10 PROCEDURE — 36415 COLL VENOUS BLD VENIPUNCTURE: CPT

## 2023-03-10 PROCEDURE — 80061 LIPID PANEL: CPT

## 2023-05-03 RX ORDER — ATORVASTATIN CALCIUM 20 MG/1
20 TABLET, FILM COATED ORAL NIGHTLY
Qty: 30 TABLET | Refills: 5 | Status: SHIPPED | OUTPATIENT
Start: 2023-05-03

## 2023-05-30 ENCOUNTER — OFFICE VISIT (OUTPATIENT)
Dept: FAMILY MEDICINE CLINIC | Facility: CLINIC | Age: 78
End: 2023-05-30

## 2023-05-30 VITALS
TEMPERATURE: 98.4 F | OXYGEN SATURATION: 98 % | BODY MASS INDEX: 28.04 KG/M2 | HEART RATE: 68 BPM | RESPIRATION RATE: 16 BRPM | WEIGHT: 185 LBS | DIASTOLIC BLOOD PRESSURE: 82 MMHG | HEIGHT: 68 IN | SYSTOLIC BLOOD PRESSURE: 135 MMHG

## 2023-05-30 DIAGNOSIS — I48.11 LONGSTANDING PERSISTENT ATRIAL FIBRILLATION: Chronic | ICD-10-CM

## 2023-05-30 DIAGNOSIS — I10 BENIGN ESSENTIAL HYPERTENSION: Primary | Chronic | ICD-10-CM

## 2023-05-30 DIAGNOSIS — E78.49 OTHER HYPERLIPIDEMIA: Chronic | ICD-10-CM

## 2023-05-30 DIAGNOSIS — M06.9 RHEUMATOID ARTHRITIS, INVOLVING UNSPECIFIED SITE, UNSPECIFIED WHETHER RHEUMATOID FACTOR PRESENT: Chronic | ICD-10-CM

## 2023-05-30 DIAGNOSIS — I25.10 CORONARY ARTERY DISEASE INVOLVING NATIVE CORONARY ARTERY OF NATIVE HEART WITHOUT ANGINA PECTORIS: Chronic | ICD-10-CM

## 2023-05-30 PROCEDURE — 99214 OFFICE O/P EST MOD 30 MIN: CPT | Performed by: FAMILY MEDICINE

## 2023-05-30 NOTE — PROGRESS NOTES
BMI is >= 25 and <30. (Overweight) The following options were offered after discussion;: weight loss educational material (shared in after visit summary)

## 2023-05-30 NOTE — PROGRESS NOTES
"Subjective   Matheus Jolly is a 78 y.o. male.     Chief Complaint   Patient presents with   • Hypertension     BMI DUE   • Hyperlipidemia     6 mth f/u       HPI  Chief complaint: Hypertension hyperlipidemia coronary artery disease rheumatoid arthritis    The patient is a 78-year-old white male comes in for follow-up and maintenance of his current problems which include    1.  Hypertension-stable-the patient on Cozaar 50 mg daily metoprolol 25 mg daily spironolactone 25 mg daily.  Patient's blood pressure stable.    2.  Hyperlipidemia-stable-the patient is on Lipitor 20 mg daily.  Denies myalgias and arthralgias.    3.  Coronary artery disease-stable-the patient is on Plavix 75 mg daily metoprolol.  Denies chest pain shortness of breath orthopnea or PND.    4.  Atrial fibrillation-stable-the patient on metoprolol 25 mg daily Eliquis 5 mg twice a day.  Denied episodes of rapid or slow heart rhythm.    5.  Rheumatoid arthritis-stable-the patient on no medication.  He does not have active symptoms at this time.      The following portions of the patient's history were reviewed and updated as appropriate: allergies, current medications, past family history, past medical history, past social history, past surgical history and problem list.    Review of Systems    Objective     /82 (BP Location: Right arm, Patient Position: Sitting, Cuff Size: Adult)   Pulse 68   Temp 98.4 °F (36.9 °C) (Temporal)   Resp 16   Ht 172.7 cm (68\")   Wt 83.9 kg (185 lb)   SpO2 98%   BMI 28.13 kg/m²     Physical Exam  Vitals and nursing note reviewed.   Constitutional:       Appearance: He is well-developed.   HENT:      Head: Normocephalic and atraumatic.   Eyes:      Pupils: Pupils are equal, round, and reactive to light.   Cardiovascular:      Rate and Rhythm: Normal rate and regular rhythm.      Pulses: Normal pulses.      Heart sounds: Normal heart sounds. No murmur heard.    No friction rub. No gallop.   Pulmonary:      " Effort: Pulmonary effort is normal.      Breath sounds: Normal breath sounds.   Abdominal:      General: Bowel sounds are normal.      Palpations: Abdomen is soft.   Musculoskeletal:         General: Normal range of motion.      Cervical back: Neck supple.   Skin:     General: Skin is warm and dry.   Neurological:      Mental Status: He is alert and oriented to person, place, and time.   Psychiatric:         Behavior: Behavior normal.         Thought Content: Thought content normal.         Judgment: Judgment normal.           Assessment & Plan   Diagnoses and all orders for this visit:    1. Benign essential hypertension (Primary)  -     Basic Metabolic Panel; Future    2. Other hyperlipidemia  -     ALT; Future    3. Coronary artery disease involving native coronary artery of native heart without angina pectoris    4. Rheumatoid arthritis, involving unspecified site, unspecified whether rheumatoid factor present    5. Longstanding persistent atrial fibrillation      Patient Instructions   Continue your current medications and treatment...    Have the follow up labs done and call for results...    Follow up in the office in 6 months...       Ho Ro Jr., MD    05/30/23

## 2023-05-30 NOTE — PATIENT INSTRUCTIONS
Continue your current medications and treatment...    Have the follow up labs done and call for results...    Follow up in the office in 6 months...

## 2023-06-19 RX ORDER — LOSARTAN POTASSIUM 50 MG/1
50 TABLET ORAL DAILY
Qty: 90 TABLET | Refills: 1 | Status: SHIPPED | OUTPATIENT
Start: 2023-06-19

## 2023-06-19 RX ORDER — LOSARTAN POTASSIUM 50 MG/1
50 TABLET ORAL DAILY
Qty: 90 TABLET | Refills: 1 | Status: CANCELLED | OUTPATIENT
Start: 2023-06-19

## 2023-08-23 ENCOUNTER — LAB (OUTPATIENT)
Dept: LAB | Facility: HOSPITAL | Age: 78
End: 2023-08-23
Payer: COMMERCIAL

## 2023-08-23 DIAGNOSIS — E78.49 OTHER HYPERLIPIDEMIA: Chronic | ICD-10-CM

## 2023-08-23 LAB — ALT SERPL W P-5'-P-CCNC: 14 U/L (ref 1–41)

## 2023-08-23 PROCEDURE — 84460 ALANINE AMINO (ALT) (SGPT): CPT

## 2023-08-23 PROCEDURE — 36415 COLL VENOUS BLD VENIPUNCTURE: CPT

## 2023-10-18 ENCOUNTER — OFFICE VISIT (OUTPATIENT)
Dept: CARDIOLOGY | Facility: CLINIC | Age: 78
End: 2023-10-18
Payer: COMMERCIAL

## 2023-10-18 VITALS
RESPIRATION RATE: 18 BRPM | SYSTOLIC BLOOD PRESSURE: 161 MMHG | HEIGHT: 68 IN | BODY MASS INDEX: 27.74 KG/M2 | WEIGHT: 183 LBS | HEART RATE: 68 BPM | DIASTOLIC BLOOD PRESSURE: 79 MMHG

## 2023-10-18 DIAGNOSIS — Z76.89 ENCOUNTER TO ESTABLISH CARE: Primary | ICD-10-CM

## 2023-10-18 RX ORDER — LOSARTAN POTASSIUM 50 MG/1
50 TABLET ORAL 2 TIMES DAILY
Qty: 60 TABLET | Refills: 5 | Status: SHIPPED | OUTPATIENT
Start: 2023-10-18

## 2023-10-23 RX ORDER — ATORVASTATIN CALCIUM 20 MG/1
20 TABLET, FILM COATED ORAL NIGHTLY
Qty: 30 TABLET | Refills: 5 | Status: SHIPPED | OUTPATIENT
Start: 2023-10-23

## 2023-10-23 RX ORDER — ATORVASTATIN CALCIUM 20 MG/1
20 TABLET, FILM COATED ORAL NIGHTLY
Qty: 30 TABLET | Refills: 5 | Status: CANCELLED | OUTPATIENT
Start: 2023-10-23

## 2023-10-27 NOTE — PROGRESS NOTES
Cardiology Clinic Note  Shay Mirza MD, PhD    Subjective:     Encounter Date:10/18/2023      Patient ID: Matheus Jolly is a 78 y.o. male.    Chief Complaint:  Chief Complaint   Patient presents with    Follow-up       HPI:      Previously I had the pleasure to see this 78 year-old gentleman in clinic for follow-up, last year 2021 he was hospitalization with anterior STEMI, Xience drug-eluting stent placement to the mid to proximal LAD, post procedurally either had thromboembolic event to the apical LAD versus microvascular occlusion at the apex, he was ultimately taken back to the Cath Lab a second time with ST elevations and Q waves throughout the inferior leads with large wraparound LAD with no reflow.  Balloon was passed except there was no reflow on second procedure.  Revascularization the small caliber wraparound LAD was abandoned ultimately his EF was 40%.  He had no cardiac arrhythmias, there was preservation of the basal anterior, mid anterolateral and all other segments other than the apex and apical inferior wall which were akinetic.  He had some periprocedural and hospitalization atrial fibrillation was placed on Eliquis for anticoagulation.  He remains on Effient and Eliquis.  There is no bleeding.  He denies any anginal chest pain, he is on statin therapy and no beta-blocker secondary to prior bradycardia heart rate 60s presently.  He is otherwise doing well with no peripheral edema, NYHA class II shortness of air, CCS class I.  He is well compensated and on goal-directed medical therapy     Presents back otherwise doing well with no heart failure signs or symptoms, companied by his wife today, he had  Covid pneumonia January 2023 earlier this year on top of his coronary disease exacerbating his chest pain likely with demand ischemia with occluded apical LAD despite antiplatelet therapy.  His EF is around 40% chronically, as he is NYHA class II-III with respect to dyspnea on exertion but no  swelling peripherally.  He is compliant with all medicines and no other complaints.  No volume overload. No recurrence of any atrial fibrillation, blood pressure not optimal at 1 50-1 60 here in clinic today, at home is better 140 systolic and we discussed increasing his losartan today with his underlying cardiomyopathy which would be optimal, his heart rates are in the 60s and sinus.      EKG today sinus rhythm, normal conduction, inferior and anterolateral MI old  Review of systems negative x14 point review of systems except was mentioned above     Historical data copied forward from previous encounters in EMR is unchanged     Assessment and plan per my encounter today  CAD status post anterior STEMI EF 40%  Ischemic or myopathy  Systolic heart failure EF 40%, presently compensated  Essential hypertension  Paroxysmal atrial fibrillation     Plan today  Remain off amiodarone to avoid long-term side effects, resolved COVID-pneumonia earlier 2022, hopefully will have less A. fib moving forward  Continue Plavix with continuation of Eliquis on board continue with stent thrombosis, would like to continue P2 Y 12 inhibitor with Plavix out to 1 year given STEMI, anterior stenting, reduced flow with microvascular dysfunction  Paroxysmal atrial fibrillation continue Eliquis, off amiodarone,gentle beta-blocker will be continued  If A. fib returns, would likely need pacemaker for tachybradycardia syndrome given higher doses of beta-blocker with significant bradycardia  Atorvastatin high intensity statin therapy as tolerated, continue 40 of atorvastatin daily  Hypertension, cardiomyopathy,  continue losartan but increase to 50 daily with uncontrolled blood pressures home blood pressure logs 1 20-1 30 systolic    Refer for establishing a new primary care as he says he is retiring     See him back in 6 months              Objective:         /79 (BP Location: Left arm, Patient Position: Sitting)   Pulse 68   Resp 18   Ht  "172.7 cm (68\")   Wt 83 kg (183 lb)   BMI 27.83 kg/m²         Diagnoses and all orders for this visit:    1. Encounter to establish care (Primary)  -     Ambulatory Referral to Family Practice    Other orders  -     losartan (Cozaar) 50 MG tablet; Take 1 tablet by mouth 2 (Two) Times a Day.  Dispense: 60 tablet; Refill: 5      The pleasure to be involved in this patient's cardiovascular care.  Please call with any questions or concerns  Shay Mirza MD, PhD    Most recent EKG as reviewed and interpreted by me:    ECG 12 Lead    Date/Time: 10/18/2023 2:08 PM  Performed by: Shay Mirza MD    Authorized by: Shay Mirza MD  Comparison: not compared with previous ECG   Previous ECG: no previous ECG available  Rhythm: sinus rhythm  Rate: normal    Clinical impression: abnormal EKG and myocardial infarction  Comments: Old anterolateral and inferior MI           Most recent echo as reviewed and interpreted by me:  Results for orders placed during the hospital encounter of 07/08/21    Adult Transthoracic Echo Complete w/ Color, Spectral and Contrast if Necessary Per Protocol    Interpretation Summary  · Mild dilation of the aortic root is present.  · Estimated left ventricular EF = 45% Estimated left ventricular EF was in disagreement with the calculated left ventricular EF. Left ventricular ejection fraction appears to be 41 - 45%. Left ventricular systolic function is low normal.  · Left ventricular diastolic function is consistent with (grade Ia w/high LAP) impaired relaxation.  · Estimated right ventricular systolic pressure from tricuspid regurgitation is normal (<35 mmHg).    Overall EF 45%  Regional abnormalities as identified consistent with LAD injury, apex, anteroapical, inferoapical, mid apical septum all severely hypokinetic, other segments appear to contract normally  RV size and function normal  Atrial sizes grossly normal  Grade 1 diastolic dysfunction by mitral inflow pattern  No " masses or effusion seen  Right atrial pressure estimated 5-8  Trace to mild MR, trace to mild TR no valvular stenoses seen  Structurally normal aortic valve in limited views, no regurgitation or stenosis by Doppler  Pulmonic valve not well seen no stenosis by Doppler  No Doppler evidence of ASD or PFO      Most recent stress test as reviewed and interpreted by me:      Most recent cardiac catheterization as reviewed interpreted by me:  Results for orders placed during the hospital encounter of 07/08/21    Cardiac Catheterization/Vascular Study    Narrative   Shay Mirza MD, PhD  [facility]  Date of service 7-9-21    Procedure  A 1 left heart catheterization coronary angiography, left ventriculography    Indication  Recurrent ST elevation status post PCI to the LAD, worsening inferior changes    I performed consent the patient brought to the catheterization lab sterilely prepped and draped in usual fashion expose the right groin for right common femoral arterial access via micropuncture modified Seldinger technique after 1% lidocaine analgesia, 6 [place] sheath was placed the right common femoral artery ultimately upsized to 23 cm sheath secondary to iliofemoral tortuosity again.  A diagnostic JL4 JR4 catheters were used to selective left and right coronary angiography respectively, the JR4 was used across aortic valve followed by left ventriculography via hand-injection demonstrating inferoapical and inferolateral severe hypokinesis, anterior wall had some mild hypokinesis, apex and inferoapical wall was akinetic.  LVEDP was not terribly elevated at 14.  Secondary to findings of apical LAD occlusion a EBU 4.0 guide was exchanged with a diagnostic catheter, patient was heparinized with 100 units/kg of heparin for ACT greater than 250, run-through wire was attempted to pass around the wraparound LAD apex, small subsegmental branches or cannulated repeatedly without true wraparound.   Films were reviewed from initial procedure yesterday demonstrating small threadlike wraparound LAD less than 1.5 mm in diameter.  And subsequently branches a 2.0 x 20 undeployed NC balloon was passed and retracted undeployed and attempted to restore flow, there was some mild improvement to small branches distally but wraparound LAD was never identified despite multiple wires.  Patient was hemodynamically and electrically stable in sinus rhythm, RCA, RPDA and PLV branches had YVES-3 flow, circumflex as well as all obtuse marginal branches and L PLV branch were all patent with YVES-3 flow, mid LAD stent as well as diagonal branch all widely patent with no in-stent thrombus or occlusion was seen with YVES-3 flow.  At this point of the procedure possibly with no reflow of the apex progression of MI versus distal thromboembolic event from trapped mid LAD thrombus at the site of plaque rupture and STEMI without improvement passing into play balloon in size the vessel being very small less than 1.5 mm diameter, risk versus benefit was assessed and I chose to conclude the procedure at this point.    Patient left the Cath Lab hemodynamically electrically stable or talking staff neurologically grossly intact bilaterally  Contrast usage 85 cc  Moderate conscious sedation time of 1 hour  IV Versed and fentanyl ministered by registered nurse with complete ECG pulse oximetry and hemodynamic 93 the entire to the case observed by me    Findings  1.  Opening pressure of 100/58  2.  Closing pressure at the femoral is 120/65  3.  LVEDP 14, LV function moderately to severely reduced EF estimated 35 to 40% regional abnormalities in the inferior mid to apical segments as well as akinesis of the apex, basal inferior, lateral, anteroseptal and anterior walls move normally  4.  No transaortic valve gradient on pullback    Angiography  1.  Left main unchanged, 50 to 60% narrowing with acute angle takeoff unchanged from yesterday  2.   Proximal LAD with 40% narrowing, widely patent stent of the midportion jailing diagonal branch widely patent, diffusely regularities until the apex which was under precluded at the wraparound portion of drastically tapering LAD, likely 2 mm or less in diameter, prior films show dramatic tapering to less than 1.5 to 1 mm and wraparound LAD which supplied the inferoapical wall, diagonal branch widely patent with YVES-3 flow  3.  Circumflex widely patent including all obtuse marginal branches with OM1 and OM2 diffusely regularities, continuation circumflex and L PLV branch has 60 to 70% small caliber eccentric plaque which should be treated medically but had YVES-3 flow in all branches  4.  RCA YVES-3 flow throughout diffuse luminal regularities, ostial proximal PLV branch had 70-80 but in small caliber portion the vessel less than 1.5 mm in diameter with limited runoff, ostial RPDA 50 to 60% but in small caliber portion of the vessel but likely could be stented with 2.0 mm stent if needed but had YVES-3 flow and not culprit for current presentation    Conclusions and recommendations  1 no refill of the apical LAD versus thromboembolic event from trapped thrombus in the mid LAD, unsuccessful restoration of flow with wire and passage of undeployed balloon, very small caliber portion of the vessel  #2 Integrilin drip for 18 hours on background of aspirin and Brilinta  3 maximize goal-directed medical therapy with anteroapical infarct, Aldactone has been started in addition to low-dose beta-blocker, will evaluate for candidacy for ACE inhibitor or ARB prior to discharge blood pressure will tolerate  4.  Observe in ICU, increased risk for apical aneurysmal formation, increased risk for possible arrhythmogenic event, monitor closely    Further recommendations based on clinical course in ICU with close monitoring secondary event despite max medical therapy    Shay Mirza MD, PhD    The following portions of the patient's  history were reviewed and updated as appropriate: allergies, current medications, past family history, past medical history, past social history, past surgical history, and problem list.      ROS:  14 point review of systems negative except as mentioned above    Current Outpatient Medications:     clopidogrel (PLAVIX) 75 MG tablet, Take 1 tablet by mouth Daily., Disp: 90 tablet, Rfl: 3    metoprolol succinate XL (TOPROL-XL) 25 MG 24 hr tablet, Take 1 tablet by mouth Daily., Disp: 30 tablet, Rfl: 5    multivitamin with minerals tablet tablet, Take 1 tablet by mouth Every Other Day. Alternating with Vitamin B12, Disp: , Rfl:     nitroglycerin (Nitrostat) 0.4 MG SL tablet, Place 1 tablet under the tongue Every 5 (Five) Minutes As Needed for Chest Pain. Take no more than 3 doses in 15 minutes., Disp: 25 tablet, Rfl: 3    spironolactone (ALDACTONE) 25 MG tablet, Take 1 tablet by mouth Daily., Disp: 90 tablet, Rfl: 1    vitamin B-12 (CYANOCOBALAMIN) 1000 MCG tablet, Take 1 tablet by mouth Every Other Day. Alternating with Centrum, Disp: , Rfl:     apixaban (ELIQUIS) 5 MG tablet tablet, Take 1 tablet by mouth Every 12 (Twelve) Hours. Indications: Atrial Fibrillation, Disp: 180 tablet, Rfl: 1    atorvastatin (LIPITOR) 20 MG tablet, Take 1 tablet by mouth Every Night., Disp: 30 tablet, Rfl: 5    losartan (Cozaar) 50 MG tablet, Take 1 tablet by mouth 2 (Two) Times a Day., Disp: 60 tablet, Rfl: 5    Problem List:  Patient Active Problem List   Diagnosis    ST elevation myocardial infarction (STEMI)    Arthritis, rheumatoid    Benign essential hypertension    Encounter for screening    Inguinal hernia, right    Osteoarthritis of hip    Other hyperlipidemia    Coronary artery disease involving native coronary artery without angina pectoris    Longstanding persistent atrial fibrillation    ACC/AHA stage C left heart failure with decreased ejection fraction     Past Medical History:  Past Medical History:   Diagnosis Date    Afib      CHF (congestive heart failure)     Coronary artery disease     COVID-19     Hearing loss     Hyperlipidemia     Hypertension      Past Surgical History:  Past Surgical History:   Procedure Laterality Date    CARDIAC CATHETERIZATION N/A 7/8/2021    Procedure: Left Heart Cath;  Surgeon: Shay Mirza MD;  Location: [facility] CATH INVASIVE LOCATION;  Service: Cardiology;  Laterality: N/A;    CARDIAC CATHETERIZATION N/A 7/8/2021    Procedure: Percutaneous Coronary Intervention;  Surgeon: Shay Mirza MD;  Location: [facility] CATH INVASIVE LOCATION;  Service: Cardiology;  Laterality: N/A;    CARDIAC CATHETERIZATION N/A 7/9/2021    Procedure: Left Heart Cath;  Surgeon: Shay Mirza MD;  Location:  [facility] INVASIVE LOCATION;  Service: Cardiology;  Laterality: N/A;     Social History:  Social History     Socioeconomic History    Marital status:    Tobacco Use    Smoking status: Never    Smokeless tobacco: Never   Vaping Use    Vaping Use: Never used   Substance and Sexual Activity    Alcohol use: Not Currently    Drug use: Not Currently    Sexual activity: Defer     Allergies:  No Known Allergies  Immunizations:  Immunization History   Administered Date(s) Administered    COVID-19 (PFIZER) Purple Cap Monovalent 11/15/2021, 12/17/2021            In-Office Procedure(s):  No orders to display        ASCVD RIsk Score::  The ASCVD Risk score (Lidia DK, et al., 2019) failed to calculate for the following reasons:    The patient has a prior MI or stroke diagnosis    Imaging:    Results for orders placed during the hospital encounter of 09/16/21    XR Chest 1 View    Narrative  DATE OF EXAM:  9/16/2021 7:03 AM    PROCEDURE:  XR CHEST 1 VW-    INDICATIONS:  Chest pain protocol    COMPARISON:  8/15/2021    TECHNIQUE:  Single radiographic view of the chest was obtained.    FINDINGS:  There is irregular groundglass and consolidative opacity within the left  lung base. There is also patchy  opacity in the right medial lung base.  Cardiomediastinal contours within normal limits. No evidence of  pneumothorax. Regional skeleton is unremarkable.    Impression  1. Left greater than right multifocal airspace opacity, concerning for  multifocal pneumonia, including Covid 19 pneumonia.    Electronically Signed By-Efrem Rodriguez MD On:9/16/2021 7:57 AM  This report was finalized on [id number] by  Efrem Rodriguez MD.       Results for orders placed during the hospital encounter of 09/16/21    CT Chest Pulmonary Embolism    Narrative  CT CHEST PULMONARY EMBOLISM-    Date of Exam: 9/16/2021 7:42 AM    Indication: PE suspected, low/intermediate prob, positive D-dimer.  Left-sided chest pain. Elevated d-dimer.    Comparison: Chest radiograph 9/16/2021    Technique: Serial and axial CT images of the chest were obtained  following the uneventful intravenous administration of 100 cc Isovue-370  contrast. Reconstructions in the coronal and sagittal planes were also  performed.  Automated exposure control and iterative reconstruction  methods were used.    FINDINGS:    Pulmonary Arteries: Excellent contrast opacification of the pulmonary  arteries.  No intraluminal filling defects to suggest pulmonary embolus.  The pulmonary arteries are normal in caliber.      Hilum and Mediastinum: There are no pathologically enlarged hilar  mediastinal lymph nodes. There is cardiomegaly. There is a coronary  stent. No pericardial effusion.    Lung Parenchyma and Pleura: There are small bilateral pleural effusions.  There is multifocal consolidation in both lower lobes and in the  posterior left upper lobe and lingula. Septal thickening. There is a  pleural-based nodule in the posterior right upper lobe on image number  51. It measures 7 mm.    Upper Abdomen: Unremarkable.    Soft tissues: Unremarkable.    Osseous structures: There is degenerative change with anterior bridging  osteophytes. There are degenerative changes at  costovertebral junction  inferiorly. Correlate for possible ankylosing spondylitis. No aggressive  focal lytic or sclerotic osseous lesions.    Impression  No findings of pulmonary embolus.  Multifocal airspace opacity consistent multifocal pneumonia. COVID 19  pneumonia most likely.  Small pleural effusions indicated could be a component of pulmonary  edema.  Cardiomegaly.        Electronically Signed By-Kenia Santizo MD On:9/16/2021 8:02 AM  This report was finalized on [id number] by  Kenia Santizo MD.      Results for orders placed during the hospital encounter of 09/16/21    CT Chest Pulmonary Embolism    Narrative  CT CHEST PULMONARY EMBOLISM-    Date of Exam: 9/16/2021 7:42 AM    Indication: PE suspected, low/intermediate prob, positive D-dimer.  Left-sided chest pain. Elevated d-dimer.    Comparison: Chest radiograph 9/16/2021    Technique: Serial and axial CT images of the chest were obtained  following the uneventful intravenous administration of 100 cc Isovue-370  contrast. Reconstructions in the coronal and sagittal planes were also  performed.  Automated exposure control and iterative reconstruction  methods were used.    FINDINGS:    Pulmonary Arteries: Excellent contrast opacification of the pulmonary  arteries.  No intraluminal filling defects to suggest pulmonary embolus.  The pulmonary arteries are normal in caliber.      Hilum and Mediastinum: There are no pathologically enlarged hilar  mediastinal lymph nodes. There is cardiomegaly. There is a coronary  stent. No pericardial effusion.    Lung Parenchyma and Pleura: There are small bilateral pleural effusions.  There is multifocal consolidation in both lower lobes and in the  posterior left upper lobe and lingula. Septal thickening. There is a  pleural-based nodule in the posterior right upper lobe on image number  51. It measures 7 mm.    Upper Abdomen: Unremarkable.    Soft tissues: Unremarkable.    Osseous structures: There is degenerative  change with anterior bridging  osteophytes. There are degenerative changes at costovertebral junction  inferiorly. Correlate for possible ankylosing spondylitis. No aggressive  focal lytic or sclerotic osseous lesions.    Impression  No findings of pulmonary embolus.  Multifocal airspace opacity consistent multifocal pneumonia. COVID 19  pneumonia most likely.  Small pleural effusions indicated could be a component of pulmonary  edema.  Cardiomegaly.        Electronically Signed By-Kenia Santizo MD On:9/16/2021 8:02 AM  This report was finalized on [id number] by  Kenia Santizo MD.      Lab Review:   Lab on 08/23/2023   Component Date Value    ALT (SGPT) 08/23/2023 14      Recent labs reviewed and interpreted for clinical significance and application            Level of Care:           Shay Mirza MD  10/27/23  .

## 2023-11-14 ENCOUNTER — LAB (OUTPATIENT)
Dept: LAB | Facility: HOSPITAL | Age: 78
End: 2023-11-14
Payer: COMMERCIAL

## 2023-11-14 ENCOUNTER — OFFICE VISIT (OUTPATIENT)
Dept: FAMILY MEDICINE CLINIC | Facility: CLINIC | Age: 78
End: 2023-11-14
Payer: COMMERCIAL

## 2023-11-14 VITALS
TEMPERATURE: 98.3 F | HEART RATE: 60 BPM | RESPIRATION RATE: 16 BRPM | OXYGEN SATURATION: 99 % | DIASTOLIC BLOOD PRESSURE: 73 MMHG | SYSTOLIC BLOOD PRESSURE: 133 MMHG | BODY MASS INDEX: 27.58 KG/M2 | HEIGHT: 68 IN | WEIGHT: 182 LBS

## 2023-11-14 DIAGNOSIS — E78.2 MIXED HYPERLIPIDEMIA: ICD-10-CM

## 2023-11-14 DIAGNOSIS — I10 BENIGN ESSENTIAL HYPERTENSION: Primary | Chronic | ICD-10-CM

## 2023-11-14 DIAGNOSIS — I10 BENIGN ESSENTIAL HYPERTENSION: Chronic | ICD-10-CM

## 2023-11-14 LAB
ALBUMIN SERPL-MCNC: 4.8 G/DL (ref 3.5–5.2)
ALBUMIN/GLOB SERPL: 2 G/DL
ALP SERPL-CCNC: 78 U/L (ref 39–117)
ALT SERPL W P-5'-P-CCNC: 16 U/L (ref 1–41)
ANION GAP SERPL CALCULATED.3IONS-SCNC: 8 MMOL/L (ref 5–15)
AST SERPL-CCNC: 23 U/L (ref 1–40)
BILIRUB SERPL-MCNC: 0.8 MG/DL (ref 0–1.2)
BUN SERPL-MCNC: 14 MG/DL (ref 8–23)
BUN/CREAT SERPL: 14.4 (ref 7–25)
CALCIUM SPEC-SCNC: 9.5 MG/DL (ref 8.6–10.5)
CHLORIDE SERPL-SCNC: 102 MMOL/L (ref 98–107)
CHOLEST SERPL-MCNC: 120 MG/DL (ref 0–200)
CO2 SERPL-SCNC: 29 MMOL/L (ref 22–29)
CREAT SERPL-MCNC: 0.97 MG/DL (ref 0.76–1.27)
DEPRECATED RDW RBC AUTO: 45.5 FL (ref 37–54)
EGFRCR SERPLBLD CKD-EPI 2021: 79.4 ML/MIN/1.73
ERYTHROCYTE [DISTWIDTH] IN BLOOD BY AUTOMATED COUNT: 13.6 % (ref 12.3–15.4)
GLOBULIN UR ELPH-MCNC: 2.4 GM/DL
GLUCOSE SERPL-MCNC: 108 MG/DL (ref 65–99)
HCT VFR BLD AUTO: 38.4 % (ref 37.5–51)
HDLC SERPL-MCNC: 34 MG/DL (ref 40–60)
HGB BLD-MCNC: 13 G/DL (ref 13–17.7)
LDLC SERPL CALC-MCNC: 67 MG/DL (ref 0–100)
LDLC/HDLC SERPL: 1.95 {RATIO}
MCH RBC QN AUTO: 30.7 PG (ref 26.6–33)
MCHC RBC AUTO-ENTMCNC: 33.9 G/DL (ref 31.5–35.7)
MCV RBC AUTO: 90.4 FL (ref 79–97)
PLATELET # BLD AUTO: 170 10*3/MM3 (ref 140–450)
PMV BLD AUTO: 9.2 FL (ref 6–12)
POTASSIUM SERPL-SCNC: 4.2 MMOL/L (ref 3.5–5.2)
PROT SERPL-MCNC: 7.2 G/DL (ref 6–8.5)
RBC # BLD AUTO: 4.25 10*6/MM3 (ref 4.14–5.8)
SODIUM SERPL-SCNC: 139 MMOL/L (ref 136–145)
TRIGL SERPL-MCNC: 98 MG/DL (ref 0–150)
VLDLC SERPL-MCNC: 19 MG/DL (ref 5–40)
WBC NRBC COR # BLD: 5.5 10*3/MM3 (ref 3.4–10.8)

## 2023-11-14 PROCEDURE — 85027 COMPLETE CBC AUTOMATED: CPT

## 2023-11-14 PROCEDURE — 36415 COLL VENOUS BLD VENIPUNCTURE: CPT

## 2023-11-14 PROCEDURE — 80061 LIPID PANEL: CPT

## 2023-11-14 PROCEDURE — 80053 COMPREHEN METABOLIC PANEL: CPT

## 2023-11-14 NOTE — PROGRESS NOTES
Subjective        Matheus Jolly is a 78 y.o. male.     Chief Complaint   Patient presents with    Hypertension    Hyperlipidemia     3 mth f/u, establish new provider       History of Present Illness  Pt is new to me, established with this practice. Transferring care to me from Dr. Ro. Pt says he is here for FU R/T HTN and hyperlipidemia.    1. HTN - Pt denies CP,SOA, HA, dizziness, pedal edema. Pt sees Dr Mirza Cardiology.     2. Hyperlipidemia - Pt denies N/V, diarrhea, abdominal pain, muscle cramps.    Pt says he doesn't think he needs refills on his medications at this time.      The following portions of the patient's history were reviewed and updated as appropriate: allergies, current medications, past family history, past medical history, past social history, past surgical history and problem list.      Current Outpatient Medications:     apixaban (ELIQUIS) 5 MG tablet tablet, Take 1 tablet by mouth Every 12 (Twelve) Hours. Indications: Atrial Fibrillation, Disp: 180 tablet, Rfl: 1    atorvastatin (LIPITOR) 20 MG tablet, Take 1 tablet by mouth Every Night., Disp: 30 tablet, Rfl: 5    clopidogrel (PLAVIX) 75 MG tablet, Take 1 tablet by mouth Daily., Disp: 90 tablet, Rfl: 3    losartan (Cozaar) 50 MG tablet, Take 1 tablet by mouth 2 (Two) Times a Day., Disp: 60 tablet, Rfl: 5    metoprolol succinate XL (TOPROL-XL) 25 MG 24 hr tablet, Take 1 tablet by mouth Daily., Disp: 30 tablet, Rfl: 5    multivitamin with minerals tablet tablet, Take 1 tablet by mouth Every Other Day. Alternating with Vitamin B12, Disp: , Rfl:     spironolactone (ALDACTONE) 25 MG tablet, Take 1 tablet by mouth Daily., Disp: 90 tablet, Rfl: 1    vitamin B-12 (CYANOCOBALAMIN) 1000 MCG tablet, Take 1 tablet by mouth Every Other Day. Alternating with Centrum, Disp: , Rfl:     nitroglycerin (Nitrostat) 0.4 MG SL tablet, Place 1 tablet under the tongue Every 5 (Five) Minutes As Needed for Chest Pain. Take no more than 3 doses in 15 minutes.,  "Disp: 25 tablet, Rfl: 3    Recent Results (from the past 4032 hour(s))   ALT    Collection Time: 08/23/23  3:23 PM    Specimen: Blood   Result Value Ref Range    ALT (SGPT) 14 1 - 41 U/L       Objective     /73 (BP Location: Left arm, Patient Position: Sitting, Cuff Size: Adult)   Pulse 60   Temp 98.3 °F (36.8 °C) (Temporal)   Resp 16   Ht 172.7 cm (68\")   Wt 82.6 kg (182 lb)   SpO2 99%   BMI 27.67 kg/m²     Physical Exam  Constitutional:       General: He is not in acute distress.     Appearance: Normal appearance. He is normal weight. He is not ill-appearing.   Cardiovascular:      Rate and Rhythm: Normal rate and regular rhythm.      Pulses: Normal pulses.      Heart sounds: Normal heart sounds. No murmur heard.     No friction rub. No gallop.   Pulmonary:      Effort: Pulmonary effort is normal.      Breath sounds: Normal breath sounds. No wheezing, rhonchi or rales.   Musculoskeletal:      Right lower leg: No edema.      Left lower leg: No edema.   Skin:     General: Skin is warm and dry.      Capillary Refill: Capillary refill takes less than 2 seconds.      Findings: No rash.   Neurological:      General: No focal deficit present.      Mental Status: He is alert and oriented to person, place, and time.      Sensory: Sensory deficit present.      Motor: No weakness.      Gait: Gait normal.      Comments: [place]   Psychiatric:         Mood and Affect: Mood normal.         Behavior: Behavior normal.         Thought Content: Thought content normal.         Judgment: Judgment normal.         Result Review :   The following data was reviewed by: JIMBO Palomo on 11/14/2023:  As listed above             Assessment & Plan    Diagnoses and all orders for this visit:    1. Benign essential hypertension (Primary)  Comments:  Stable. Continue medications ordered by Dr. Mirza, [place], Toprol, Aldactone. Labs ordered. FU 6 months.  Orders:  -     CBC (No diff); Future  -     Comprehensive metabolic panel; " Future  -     Lipid panel; Future  -     ALT; Future    2. Mixed hyperlipidemia  Comments:  Stable. Continue Lipitor 20 mg PO daily. Labs ordered. FU 6 months.  Orders:  -     CBC (No diff); Future  -     Comprehensive metabolic panel; Future  -     Lipid panel; Future  -     ALT; Future       There are no Patient Instructions on file for this visit.    Follow Up   Return in about 6 months (around 5/14/2024) for Next scheduled follow up.    Patient was given instructions and counseling regarding his condition or for health maintenance advice. Please see specific information pulled into the AVS if appropriate.     Christin Huizar, APRN     11/14/23

## 2023-11-15 RX ORDER — CLOPIDOGREL BISULFATE 75 MG/1
75 TABLET ORAL DAILY
Qty: 90 TABLET | Refills: 3 | Status: SHIPPED | OUTPATIENT
Start: 2023-11-15

## 2023-11-20 DIAGNOSIS — R73.09 ELEVATED RANDOM BLOOD GLUCOSE LEVEL: Primary | ICD-10-CM

## 2023-11-22 ENCOUNTER — LAB (OUTPATIENT)
Dept: LAB | Facility: HOSPITAL | Age: 78
End: 2023-11-22
Payer: COMMERCIAL

## 2023-11-22 DIAGNOSIS — R73.09 ELEVATED RANDOM BLOOD GLUCOSE LEVEL: ICD-10-CM

## 2023-11-22 LAB — HBA1C MFR BLD: 5.4 % (ref 4.8–5.6)

## 2023-11-22 PROCEDURE — 36415 COLL VENOUS BLD VENIPUNCTURE: CPT

## 2023-11-22 PROCEDURE — 83036 HEMOGLOBIN GLYCOSYLATED A1C: CPT

## 2024-01-01 RX ORDER — METOPROLOL SUCCINATE 25 MG/1
25 TABLET, EXTENDED RELEASE ORAL DAILY
Qty: 30 TABLET | Refills: 5 | Status: CANCELLED | OUTPATIENT
Start: 2024-01-01

## 2024-01-02 RX ORDER — METOPROLOL SUCCINATE 25 MG/1
25 TABLET, EXTENDED RELEASE ORAL DAILY
Qty: 30 TABLET | Refills: 5 | Status: SHIPPED | OUTPATIENT
Start: 2024-01-02

## 2024-01-16 RX ORDER — SPIRONOLACTONE 25 MG/1
25 TABLET ORAL DAILY
Qty: 90 TABLET | Refills: 1 | Status: SHIPPED | OUTPATIENT
Start: 2024-01-16

## 2024-04-15 RX ORDER — ATORVASTATIN CALCIUM 20 MG/1
20 TABLET, FILM COATED ORAL NIGHTLY
Qty: 30 TABLET | Refills: 5 | Status: CANCELLED | OUTPATIENT
Start: 2024-04-15

## 2024-04-15 RX ORDER — ATORVASTATIN CALCIUM 20 MG/1
20 TABLET, FILM COATED ORAL NIGHTLY
Qty: 30 TABLET | Refills: 5 | Status: SHIPPED | OUTPATIENT
Start: 2024-04-15

## 2024-04-25 RX ORDER — LOSARTAN POTASSIUM 50 MG/1
50 TABLET ORAL 2 TIMES DAILY
Qty: 60 TABLET | Refills: 5 | Status: SHIPPED | OUTPATIENT
Start: 2024-04-25

## 2024-05-22 ENCOUNTER — LAB (OUTPATIENT)
Dept: LAB | Facility: HOSPITAL | Age: 79
End: 2024-05-22
Payer: COMMERCIAL

## 2024-05-22 ENCOUNTER — OFFICE VISIT (OUTPATIENT)
Dept: FAMILY MEDICINE CLINIC | Facility: CLINIC | Age: 79
End: 2024-05-22
Payer: COMMERCIAL

## 2024-05-22 VITALS
HEART RATE: 58 BPM | RESPIRATION RATE: 17 BRPM | DIASTOLIC BLOOD PRESSURE: 64 MMHG | SYSTOLIC BLOOD PRESSURE: 138 MMHG | WEIGHT: 182 LBS | HEIGHT: 68 IN | BODY MASS INDEX: 27.58 KG/M2 | OXYGEN SATURATION: 97 % | TEMPERATURE: 97.9 F

## 2024-05-22 DIAGNOSIS — I10 BENIGN ESSENTIAL HYPERTENSION: ICD-10-CM

## 2024-05-22 DIAGNOSIS — E78.2 MIXED HYPERLIPIDEMIA: ICD-10-CM

## 2024-05-22 DIAGNOSIS — I10 BENIGN ESSENTIAL HYPERTENSION: Primary | Chronic | ICD-10-CM

## 2024-05-22 LAB
ALBUMIN SERPL-MCNC: 4.4 G/DL (ref 3.5–5.2)
ALBUMIN/GLOB SERPL: 1.6 G/DL
ALP SERPL-CCNC: 71 U/L (ref 39–117)
ALT SERPL W P-5'-P-CCNC: 17 U/L (ref 1–41)
ANION GAP SERPL CALCULATED.3IONS-SCNC: 8 MMOL/L (ref 5–15)
AST SERPL-CCNC: 24 U/L (ref 1–40)
BASOPHILS # BLD AUTO: 0.04 10*3/MM3 (ref 0–0.2)
BASOPHILS NFR BLD AUTO: 0.7 % (ref 0–1.5)
BILIRUB SERPL-MCNC: 0.8 MG/DL (ref 0–1.2)
BUN SERPL-MCNC: 20 MG/DL (ref 8–23)
BUN/CREAT SERPL: 21.5 (ref 7–25)
CALCIUM SPEC-SCNC: 9.4 MG/DL (ref 8.6–10.5)
CHLORIDE SERPL-SCNC: 102 MMOL/L (ref 98–107)
CHOLEST SERPL-MCNC: 127 MG/DL (ref 0–200)
CO2 SERPL-SCNC: 28 MMOL/L (ref 22–29)
CREAT SERPL-MCNC: 0.93 MG/DL (ref 0.76–1.27)
DEPRECATED RDW RBC AUTO: 42.3 FL (ref 37–54)
EGFRCR SERPLBLD CKD-EPI 2021: 83 ML/MIN/1.73
EOSINOPHIL # BLD AUTO: 0.08 10*3/MM3 (ref 0–0.4)
EOSINOPHIL NFR BLD AUTO: 1.5 % (ref 0.3–6.2)
ERYTHROCYTE [DISTWIDTH] IN BLOOD BY AUTOMATED COUNT: 12.1 % (ref 12.3–15.4)
GLOBULIN UR ELPH-MCNC: 2.8 GM/DL
GLUCOSE SERPL-MCNC: 116 MG/DL (ref 65–99)
HCT VFR BLD AUTO: 40.6 % (ref 37.5–51)
HDLC SERPL-MCNC: 35 MG/DL (ref 40–60)
HGB BLD-MCNC: 13.3 G/DL (ref 13–17.7)
IMM GRANULOCYTES # BLD AUTO: 0.03 10*3/MM3 (ref 0–0.05)
IMM GRANULOCYTES NFR BLD AUTO: 0.5 % (ref 0–0.5)
LDLC SERPL CALC-MCNC: 69 MG/DL (ref 0–100)
LDLC/HDLC SERPL: 1.9 {RATIO}
LYMPHOCYTES # BLD AUTO: 1.05 10*3/MM3 (ref 0.7–3.1)
LYMPHOCYTES NFR BLD AUTO: 19.2 % (ref 19.6–45.3)
MCH RBC QN AUTO: 31.1 PG (ref 26.6–33)
MCHC RBC AUTO-ENTMCNC: 32.8 G/DL (ref 31.5–35.7)
MCV RBC AUTO: 94.9 FL (ref 79–97)
MONOCYTES # BLD AUTO: 0.52 10*3/MM3 (ref 0.1–0.9)
MONOCYTES NFR BLD AUTO: 9.5 % (ref 5–12)
NEUTROPHILS NFR BLD AUTO: 3.75 10*3/MM3 (ref 1.7–7)
NEUTROPHILS NFR BLD AUTO: 68.6 % (ref 42.7–76)
NRBC BLD AUTO-RTO: 0 /100 WBC (ref 0–0.2)
PLATELET # BLD AUTO: 152 10*3/MM3 (ref 140–450)
PMV BLD AUTO: 10.6 FL (ref 6–12)
POTASSIUM SERPL-SCNC: 3.9 MMOL/L (ref 3.5–5.2)
PROT SERPL-MCNC: 7.2 G/DL (ref 6–8.5)
RBC # BLD AUTO: 4.28 10*6/MM3 (ref 4.14–5.8)
SODIUM SERPL-SCNC: 138 MMOL/L (ref 136–145)
TRIGL SERPL-MCNC: 128 MG/DL (ref 0–150)
TSH SERPL DL<=0.05 MIU/L-ACNC: 3.07 UIU/ML (ref 0.27–4.2)
VLDLC SERPL-MCNC: 23 MG/DL (ref 5–40)
WBC NRBC COR # BLD AUTO: 5.47 10*3/MM3 (ref 3.4–10.8)

## 2024-05-22 PROCEDURE — 36415 COLL VENOUS BLD VENIPUNCTURE: CPT

## 2024-05-22 PROCEDURE — 80061 LIPID PANEL: CPT

## 2024-05-22 PROCEDURE — 99214 OFFICE O/P EST MOD 30 MIN: CPT | Performed by: REGISTERED NURSE

## 2024-05-22 PROCEDURE — 80050 GENERAL HEALTH PANEL: CPT

## 2024-05-22 NOTE — PROGRESS NOTES
Subjective        Matheus Jolly is a 79 y.o. male.     Chief Complaint   Patient presents with    Hypertension     6 month f/u     HPI    Pt is here for 6 month F/U R/T chronic condition(s) and medication(s), lab test(s) and/or refill(s).     HTN -chronic/stable.  He is currently taking losartan 50 mg p.o. twice daily, metoprolol supinate 25 mg p.o. daily and spironolactone 25 mg p.o. daily.  His medications are managed by Dr. Mirza, cardiology.  Pt denies CP/SOA/HA/dizziness/pedal edema.   Hyperlipidemia -chronic/stable.  He is currently taking atorvastatin 20 mg p.o. nightly.  Lipid panel in November 2023 showed decreased HDL of 34.  Pt denies muscle cramps, N/V/D, jaundice.     The following portions of the patient's history were reviewed and updated as appropriate: allergies, current medications, past family history, past medical history, past social history, past surgical history and problem list.    Review of Systems     Current Outpatient Medications:     apixaban (ELIQUIS) 5 MG tablet tablet, Take 1 tablet by mouth Every 12 (Twelve) Hours. Indications: Atrial Fibrillation, Disp: 180 tablet, Rfl: 1    atorvastatin (LIPITOR) 20 MG tablet, Take 1 tablet by mouth Every Night., Disp: 30 tablet, Rfl: 5    clopidogrel (PLAVIX) 75 MG tablet, Take 1 tablet by mouth Daily., Disp: 90 tablet, Rfl: 3    losartan (Cozaar) 50 MG tablet, Take 1 tablet by mouth 2 (Two) Times a Day., Disp: 60 tablet, Rfl: 5    metoprolol succinate XL (TOPROL-XL) 25 MG 24 hr tablet, Take 1 tablet by mouth Daily., Disp: 30 tablet, Rfl: 5    multivitamin with minerals tablet tablet, Take 1 tablet by mouth Every Other Day. Alternating with Vitamin B12, Disp: , Rfl:     nitroglycerin (Nitrostat) 0.4 MG SL tablet, Place 1 tablet under the tongue Every 5 (Five) Minutes As Needed for Chest Pain. Take no more than 3 doses in 15 minutes., Disp: 25 tablet, Rfl: 3    spironolactone (ALDACTONE) 25 MG tablet, Take 1 tablet by mouth Daily., Disp: 90  "tablet, Rfl: 1    vitamin B-12 (CYANOCOBALAMIN) 1000 MCG tablet, Take 1 tablet by mouth Every Other Day. Alternating with Centrum, Disp: , Rfl:       Objective     /64 (BP Location: Left arm, Patient Position: Sitting, Cuff Size: Adult)   Pulse 58   Temp 97.9 °F (36.6 °C) (Oral)   Resp 17   Ht 172.7 cm (68\")   Wt 82.6 kg (182 lb)   SpO2 97%   BMI 27.67 kg/m²     Physical Exam  Vitals reviewed.   Constitutional:       General: He is not in acute distress.     Appearance: Normal appearance. He is normal weight. He is not ill-appearing.   HENT:      Mouth/Throat:      Mouth: Mucous membranes are moist.   Eyes:      General: No scleral icterus.     Extraocular Movements: Extraocular movements intact.      Conjunctiva/sclera: Conjunctivae normal.      Pupils: Pupils are equal, round, and reactive to light.   Cardiovascular:      Rate and Rhythm: Normal rate and regular rhythm.      Pulses: Normal pulses.      Heart sounds: Normal heart sounds. No murmur heard.     No friction rub. No gallop.   Pulmonary:      Effort: Pulmonary effort is normal. No respiratory distress.      Breath sounds: Normal breath sounds. No wheezing or rhonchi.   Abdominal:      General: Abdomen is flat. Bowel sounds are normal. There is no distension.      Palpations: Abdomen is soft. There is no mass.      Tenderness: There is no abdominal tenderness.   Musculoskeletal:      Cervical back: Normal range of motion and neck supple. No rigidity or tenderness.      Right lower leg: No edema.      Left lower leg: No edema.   Lymphadenopathy:      Cervical: No cervical adenopathy.   Skin:     General: Skin is warm and dry.      Capillary Refill: Capillary refill takes 2 to 3 seconds.      Findings: No rash.   Neurological:      General: No focal deficit present.      Mental Status: He is alert and oriented to person, place, and time.      Motor: No weakness.      Gait: Gait normal.   Psychiatric:         Mood and Affect: Mood normal.        "  Behavior: Behavior normal.         Thought Content: Thought content normal.         Judgment: Judgment normal.         Result Review :   The following data was reviewed by: JIMBO Palomo on 05/22/2024:  Recent Results (from the past 1344 hour(s))   Comprehensive Metabolic Panel    Collection Time: 05/22/24  9:23 AM    Specimen: Blood   Result Value Ref Range    Glucose 116 (H) 65 - 99 mg/dL    BUN 20 8 - 23 mg/dL    Creatinine 0.93 0.76 - 1.27 mg/dL    Sodium 138 136 - 145 mmol/L    Potassium 3.9 3.5 - 5.2 mmol/L    Chloride 102 98 - 107 mmol/L    CO2 28.0 22.0 - 29.0 mmol/L    Calcium 9.4 8.6 - 10.5 mg/dL    Total Protein 7.2 6.0 - 8.5 g/dL    Albumin 4.4 3.5 - 5.2 g/dL    ALT (SGPT) 17 1 - 41 U/L    AST (SGOT) 24 1 - 40 U/L    Alkaline Phosphatase 71 39 - 117 U/L    Total Bilirubin 0.8 0.0 - 1.2 mg/dL    Globulin 2.8 gm/dL    A/G Ratio 1.6 g/dL    BUN/Creatinine Ratio 21.5 7.0 - 25.0    Anion Gap 8.0 5.0 - 15.0 mmol/L    eGFR 83.0 >60.0 mL/min/1.73   TSH Rfx On Abnormal To Free T4    Collection Time: 05/22/24  9:23 AM    Specimen: Blood   Result Value Ref Range    TSH 3.070 0.270 - 4.200 uIU/mL   Lipid Panel    Collection Time: 05/22/24  9:23 AM    Specimen: Blood   Result Value Ref Range    Total Cholesterol 127 0 - 200 mg/dL    Triglycerides 128 0 - 150 mg/dL    HDL Cholesterol 35 (L) 40 - 60 mg/dL    LDL Cholesterol  69 0 - 100 mg/dL    VLDL Cholesterol 23 5 - 40 mg/dL    LDL/HDL Ratio 1.90    CBC Auto Differential    Collection Time: 05/22/24  9:23 AM    Specimen: Blood   Result Value Ref Range    WBC 5.47 3.40 - 10.80 10*3/mm3    RBC 4.28 4.14 - 5.80 10*6/mm3    Hemoglobin 13.3 13.0 - 17.7 g/dL    Hematocrit 40.6 37.5 - 51.0 %    MCV 94.9 79.0 - 97.0 fL    MCH 31.1 26.6 - 33.0 pg    MCHC 32.8 31.5 - 35.7 g/dL    RDW 12.1 (L) 12.3 - 15.4 %    RDW-SD 42.3 37.0 - 54.0 fl    MPV 10.6 6.0 - 12.0 fL    Platelets 152 140 - 450 10*3/mm3    Neutrophil % 68.6 42.7 - 76.0 %    Lymphocyte % 19.2 (L) 19.6 - 45.3 %     Monocyte % 9.5 5.0 - 12.0 %    Eosinophil % 1.5 0.3 - 6.2 %    Basophil % 0.7 0.0 - 1.5 %    Immature Grans % 0.5 0.0 - 0.5 %    Neutrophils, Absolute 3.75 1.70 - 7.00 10*3/mm3    Lymphocytes, Absolute 1.05 0.70 - 3.10 10*3/mm3    Monocytes, Absolute 0.52 0.10 - 0.90 10*3/mm3    Eosinophils, Absolute 0.08 0.00 - 0.40 10*3/mm3    Basophils, Absolute 0.04 0.00 - 0.20 10*3/mm3    Immature Grans, Absolute 0.03 0.00 - 0.05 10*3/mm3    nRBC 0.0 0.0 - 0.2 /100 WBC     Common labs          11/14/2023    14:34 11/22/2023    12:57 5/22/2024    09:23   Common Labs   Glucose 108   116    BUN 14   20    Creatinine 0.97   0.93    Sodium 139   138    Potassium 4.2   3.9    Chloride 102   102    Calcium 9.5   9.4    Albumin 4.8   4.4    Total Bilirubin 0.8   0.8    Alkaline Phosphatase 78   71    AST (SGOT) 23   24    ALT (SGPT) 16   17    WBC 5.50   5.47    Hemoglobin 13.0   13.3    Hematocrit 38.4   40.6    Platelets 170   152    Total Cholesterol 120   127    Triglycerides 98   128    HDL Cholesterol 34   35    LDL Cholesterol  67   69    Hemoglobin A1C  5.40                Assessment & Plan    Diagnoses and all orders for this visit:    1. Benign essential hypertension (Primary)  -     TSH Rfx On Abnormal To Free T4; Future  -     Lipid Panel; Future  -     Comprehensive Metabolic Panel; Future  -     CBC & Differential; Future    2. Mixed hyperlipidemia  -     TSH Rfx On Abnormal To Free T4; Future  -     Lipid Panel; Future  -     Comprehensive Metabolic Panel; Future  -     CBC & Differential; Future       There are no Patient Instructions on file for this visit.    Follow Up   Return in about 6 months (around 11/22/2024) for Next scheduled follow up.    Patient was given instructions and counseling regarding his condition or for health maintenance advice. Please see specific information pulled into the AVS if appropriate.      Christin Huizar, APRN     05/24/24

## 2024-06-25 RX ORDER — METOPROLOL SUCCINATE 25 MG/1
25 TABLET, EXTENDED RELEASE ORAL DAILY
Qty: 30 TABLET | Refills: 5 | Status: SHIPPED | OUTPATIENT
Start: 2024-06-25

## 2024-07-15 RX ORDER — SPIRONOLACTONE 25 MG/1
25 TABLET ORAL DAILY
Qty: 90 TABLET | Refills: 1 | Status: SHIPPED | OUTPATIENT
Start: 2024-07-15

## 2024-09-03 ENCOUNTER — OFFICE VISIT (OUTPATIENT)
Dept: CARDIOLOGY | Facility: CLINIC | Age: 79
End: 2024-09-03
Payer: COMMERCIAL

## 2024-09-03 VITALS
HEIGHT: 68 IN | OXYGEN SATURATION: 98 % | WEIGHT: 178 LBS | BODY MASS INDEX: 26.98 KG/M2 | HEART RATE: 60 BPM | DIASTOLIC BLOOD PRESSURE: 89 MMHG | RESPIRATION RATE: 18 BRPM | SYSTOLIC BLOOD PRESSURE: 152 MMHG

## 2024-09-03 DIAGNOSIS — I48.11 LONGSTANDING PERSISTENT ATRIAL FIBRILLATION: Chronic | ICD-10-CM

## 2024-09-03 DIAGNOSIS — E78.2 MIXED HYPERLIPIDEMIA: ICD-10-CM

## 2024-09-03 DIAGNOSIS — I10 BENIGN ESSENTIAL HYPERTENSION: Chronic | ICD-10-CM

## 2024-09-03 DIAGNOSIS — I21.3 ST ELEVATION MYOCARDIAL INFARCTION (STEMI), UNSPECIFIED ARTERY: ICD-10-CM

## 2024-09-03 DIAGNOSIS — Z09 FOLLOW-UP EXAM: Primary | ICD-10-CM

## 2024-09-03 DIAGNOSIS — I25.10 CORONARY ARTERY DISEASE INVOLVING NATIVE CORONARY ARTERY OF NATIVE HEART WITHOUT ANGINA PECTORIS: Chronic | ICD-10-CM

## 2024-10-09 RX ORDER — ATORVASTATIN CALCIUM 20 MG/1
20 TABLET, FILM COATED ORAL NIGHTLY
Qty: 30 TABLET | Refills: 5 | Status: CANCELLED | OUTPATIENT
Start: 2024-10-09

## 2024-10-09 RX ORDER — ATORVASTATIN CALCIUM 20 MG/1
20 TABLET, FILM COATED ORAL NIGHTLY
Qty: 30 TABLET | Refills: 5 | Status: SHIPPED | OUTPATIENT
Start: 2024-10-09

## 2024-10-24 RX ORDER — LOSARTAN POTASSIUM 50 MG/1
50 TABLET ORAL 2 TIMES DAILY
Qty: 60 TABLET | Refills: 5 | Status: SHIPPED | OUTPATIENT
Start: 2024-10-24

## 2024-11-14 RX ORDER — CLOPIDOGREL BISULFATE 75 MG/1
75 TABLET ORAL DAILY
Qty: 90 TABLET | Refills: 3 | Status: SHIPPED | OUTPATIENT
Start: 2024-11-14

## 2024-11-27 ENCOUNTER — OFFICE VISIT (OUTPATIENT)
Dept: FAMILY MEDICINE CLINIC | Facility: CLINIC | Age: 79
End: 2024-11-27
Payer: COMMERCIAL

## 2024-11-27 VITALS
HEART RATE: 62 BPM | TEMPERATURE: 97.2 F | DIASTOLIC BLOOD PRESSURE: 79 MMHG | HEIGHT: 68 IN | RESPIRATION RATE: 18 BRPM | BODY MASS INDEX: 27.04 KG/M2 | SYSTOLIC BLOOD PRESSURE: 163 MMHG | WEIGHT: 178.4 LBS | OXYGEN SATURATION: 99 %

## 2024-11-27 DIAGNOSIS — E78.2 MIXED HYPERLIPIDEMIA: ICD-10-CM

## 2024-11-27 DIAGNOSIS — Z13.1 SCREENING FOR DIABETES MELLITUS (DM): ICD-10-CM

## 2024-11-27 DIAGNOSIS — I10 BENIGN ESSENTIAL HYPERTENSION: Primary | Chronic | ICD-10-CM

## 2024-11-27 PROCEDURE — 99214 OFFICE O/P EST MOD 30 MIN: CPT | Performed by: REGISTERED NURSE

## 2024-11-27 NOTE — PROGRESS NOTES
Subjective        Matheus Jolly is a 79 y.o. male who presents to [facility].     Chief Complaint   Patient presents with    Hypertension     6 month fl/u       Hypertension        Pt is here for F/U R/T chronic condition(s) and medication(s), lab test(s) and/or refill(s).    HTN - Pt denies CP/SOA/HA/dizziness/pedal edema.  Hyperlipidemia - Pt denies muscle cramps, N/V/D, jaundice.    The following portions of the patient's history were reviewed and updated as appropriate: allergies, current medications, past family history, past medical history, past social history, past surgical history and problem list.    No Known Allergies       Current Outpatient Medications:     apixaban (ELIQUIS) 5 MG tablet tablet, Take 1 tablet by mouth Every 12 (Twelve) Hours. Indications: Atrial Fibrillation, Disp: 180 tablet, Rfl: 1    atorvastatin (LIPITOR) 20 MG tablet, Take 1 tablet by mouth Every Night., Disp: 30 tablet, Rfl: 5    clopidogrel (PLAVIX) 75 MG tablet, Take 1 tablet by mouth Daily., Disp: 90 tablet, Rfl: 3    losartan (Cozaar) 50 MG tablet, Take 1 tablet by mouth 2 (Two) Times a Day., Disp: 60 tablet, Rfl: 5    metoprolol succinate XL (TOPROL-XL) 25 MG 24 hr tablet, Take 1 tablet by mouth Daily., Disp: 30 tablet, Rfl: 5    multivitamin with minerals tablet tablet, Take 1 tablet by mouth Every Other Day. Alternating with Vitamin B12, Disp: , Rfl:     nitroglycerin (Nitrostat) 0.4 MG SL tablet, Place 1 tablet under the tongue Every 5 (Five) Minutes As Needed for Chest Pain. Take no more than 3 doses in 15 minutes., Disp: 25 tablet, Rfl: 3    spironolactone (ALDACTONE) 25 MG tablet, Take 1 tablet by mouth Daily., Disp: 90 tablet, Rfl: 1    vitamin B-12 (CYANOCOBALAMIN) 1000 MCG tablet, Take 1 tablet by mouth Every Other Day. Alternating with Centrum, Disp: , Rfl:     Review of Systems     Objective     /79 (BP Location: Left arm, Patient Position: Sitting, Cuff Size: Adult)   Pulse  "62   Temp 97.2 °F (36.2 °C) (Oral)   Resp 18   Ht 172.7 cm (67.99\")   Wt 80.9 kg (178 lb 6.4 oz)   SpO2 99%   BMI 27.13 kg/m²   BMI is >= 25 and <30. (Overweight) The following options were offered after discussion;: Pt is tall and lean, he is not obese or overweight.     Physical Exam  Constitutional:       General: He is not in acute distress.     Appearance: Normal appearance. He is not ill-appearing.   Eyes:      General: No scleral icterus.     Conjunctiva/sclera: Conjunctivae normal.   Cardiovascular:      Rate and Rhythm: Normal rate and regular rhythm.      Pulses: Normal pulses.      Heart sounds: Normal heart sounds. No murmur heard.     No friction rub. No gallop.   Pulmonary:      Effort: Pulmonary effort is normal. No respiratory distress.      Breath sounds: Normal breath sounds. No wheezing or rhonchi.   Abdominal:      General: Abdomen is flat. Bowel sounds are normal. There is no distension.      Palpations: Abdomen is soft. There is no mass.      Tenderness: There is no abdominal tenderness.   Musculoskeletal:      Cervical back: Normal range of motion and neck supple. No rigidity or tenderness.      Right lower leg: No edema.      Left lower leg: No edema.   Lymphadenopathy:      Cervical: No cervical adenopathy.   Skin:     General: Skin is warm and dry.      Capillary Refill: Capillary refill takes 2 to 3 seconds.      Findings: No rash.   Neurological:      General: No focal deficit present.      Mental Status: He is alert and oriented to person, place, and time.      Sensory: No sensory deficit.      Motor: No weakness.      Gait: Gait normal.   Psychiatric:         Mood and Affect: Mood normal.         Behavior: Behavior normal.         Thought Content: Thought content normal.         Judgment: Judgment normal.     Answers submitted by the patient for this visit:  Primary Reason for Visit (Submitted on 11/20/2024)  What is the primary reason for your visit?: Problem Not " Listed        Result Review    The following data was reviewed by: JIMBO Palomo on 11/27/2024:  Common labs          5/22/2024    09:23   Common Labs   Glucose 116    BUN 20    Creatinine 0.93    Sodium 138    Potassium 3.9    Chloride 102    Calcium 9.4    Albumin 4.4    Total Bilirubin 0.8    Alkaline Phosphatase 71    AST (SGOT) 24    ALT (SGPT) 17    WBC 5.47    Hemoglobin 13.3    Hematocrit 40.6    Platelets 152    Total Cholesterol 127    Triglycerides 128    HDL Cholesterol 35    LDL Cholesterol  69                 Assessment & Plan    Diagnoses and all orders for this visit:    1. Benign essential hypertension (Primary)  -     TSH Rfx On Abnormal To Free T4; Future  -     Lipid Panel; Future  -     Comprehensive Metabolic Panel; Future  -     CBC & Differential; Future    2. Mixed hyperlipidemia  -     TSH Rfx On Abnormal To Free T4; Future  -     Lipid Panel; Future    3. Screening for diabetes mellitus (DM)  -     Hemoglobin A1c; Future       There are no Patient Instructions on file for this visit.      Follow Up   Return in about 6 months (around 5/27/2025) for Next scheduled follow up.    Patient was given instructions and counseling regarding his condition or for health maintenance advice. Please see specific information pulled into the AVS if appropriate.     JIMBO Palomo     11/27/24

## 2024-12-17 ENCOUNTER — TELEPHONE (OUTPATIENT)
Dept: FAMILY MEDICINE CLINIC | Facility: CLINIC | Age: 79
End: 2024-12-17
Payer: COMMERCIAL

## 2024-12-17 NOTE — TELEPHONE ENCOUNTER
Called patient and spoke with his wife to follow up on labs that Christin ordered. She said she will take him to go to gets it done soon.

## 2024-12-18 RX ORDER — METOPROLOL SUCCINATE 25 MG/1
25 TABLET, EXTENDED RELEASE ORAL DAILY
Qty: 30 TABLET | Refills: 5 | Status: SHIPPED | OUTPATIENT
Start: 2024-12-18

## 2025-01-07 RX ORDER — SPIRONOLACTONE 25 MG/1
25 TABLET ORAL DAILY
Qty: 90 TABLET | Refills: 1 | Status: SHIPPED | OUTPATIENT
Start: 2025-01-07

## 2025-01-09 ENCOUNTER — LAB (OUTPATIENT)
Dept: LAB | Facility: HOSPITAL | Age: 80
End: 2025-01-09
Payer: COMMERCIAL

## 2025-01-09 DIAGNOSIS — I10 BENIGN ESSENTIAL HYPERTENSION: ICD-10-CM

## 2025-01-09 DIAGNOSIS — Z13.1 SCREENING FOR DIABETES MELLITUS (DM): ICD-10-CM

## 2025-01-09 DIAGNOSIS — E78.2 MIXED HYPERLIPIDEMIA: ICD-10-CM

## 2025-01-09 LAB
ALBUMIN SERPL-MCNC: 4.6 G/DL (ref 3.5–5.2)
ALBUMIN/GLOB SERPL: 1.9 G/DL
ALP SERPL-CCNC: 72 U/L (ref 39–117)
ALT SERPL W P-5'-P-CCNC: 15 U/L (ref 1–41)
ANION GAP SERPL CALCULATED.3IONS-SCNC: 9.7 MMOL/L (ref 5–15)
AST SERPL-CCNC: 21 U/L (ref 1–40)
BASOPHILS # BLD AUTO: 0.05 10*3/MM3 (ref 0–0.2)
BASOPHILS NFR BLD AUTO: 0.9 % (ref 0–1.5)
BILIRUB SERPL-MCNC: 0.9 MG/DL (ref 0–1.2)
BUN SERPL-MCNC: 20 MG/DL (ref 8–23)
BUN/CREAT SERPL: 20.4 (ref 7–25)
CALCIUM SPEC-SCNC: 9.3 MG/DL (ref 8.6–10.5)
CHLORIDE SERPL-SCNC: 102 MMOL/L (ref 98–107)
CHOLEST SERPL-MCNC: 127 MG/DL (ref 0–200)
CO2 SERPL-SCNC: 27.3 MMOL/L (ref 22–29)
CREAT SERPL-MCNC: 0.98 MG/DL (ref 0.76–1.27)
DEPRECATED RDW RBC AUTO: 43.2 FL (ref 37–54)
EGFRCR SERPLBLD CKD-EPI 2021: 78 ML/MIN/1.73
EOSINOPHIL # BLD AUTO: 0.09 10*3/MM3 (ref 0–0.4)
EOSINOPHIL NFR BLD AUTO: 1.5 % (ref 0.3–6.2)
ERYTHROCYTE [DISTWIDTH] IN BLOOD BY AUTOMATED COUNT: 12.6 % (ref 12.3–15.4)
GLOBULIN UR ELPH-MCNC: 2.4 GM/DL
GLUCOSE SERPL-MCNC: 109 MG/DL (ref 65–99)
HBA1C MFR BLD: 5.51 % (ref 4.8–5.6)
HCT VFR BLD AUTO: 40.2 % (ref 37.5–51)
HDLC SERPL-MCNC: 35 MG/DL (ref 40–60)
HGB BLD-MCNC: 13.3 G/DL (ref 13–17.7)
IMM GRANULOCYTES # BLD AUTO: 0.03 10*3/MM3 (ref 0–0.05)
IMM GRANULOCYTES NFR BLD AUTO: 0.5 % (ref 0–0.5)
LDLC SERPL CALC-MCNC: 71 MG/DL (ref 0–100)
LDLC/HDLC SERPL: 1.98 {RATIO}
LYMPHOCYTES # BLD AUTO: 1.23 10*3/MM3 (ref 0.7–3.1)
LYMPHOCYTES NFR BLD AUTO: 21.2 % (ref 19.6–45.3)
MCH RBC QN AUTO: 31 PG (ref 26.6–33)
MCHC RBC AUTO-ENTMCNC: 33.1 G/DL (ref 31.5–35.7)
MCV RBC AUTO: 93.7 FL (ref 79–97)
MONOCYTES # BLD AUTO: 0.55 10*3/MM3 (ref 0.1–0.9)
MONOCYTES NFR BLD AUTO: 9.5 % (ref 5–12)
NEUTROPHILS NFR BLD AUTO: 3.86 10*3/MM3 (ref 1.7–7)
NEUTROPHILS NFR BLD AUTO: 66.4 % (ref 42.7–76)
NRBC BLD AUTO-RTO: 0 /100 WBC (ref 0–0.2)
PLATELET # BLD AUTO: 173 10*3/MM3 (ref 140–450)
PMV BLD AUTO: 10.6 FL (ref 6–12)
POTASSIUM SERPL-SCNC: 4 MMOL/L (ref 3.5–5.2)
PROT SERPL-MCNC: 7 G/DL (ref 6–8.5)
RBC # BLD AUTO: 4.29 10*6/MM3 (ref 4.14–5.8)
SODIUM SERPL-SCNC: 139 MMOL/L (ref 136–145)
TRIGL SERPL-MCNC: 114 MG/DL (ref 0–150)
TSH SERPL DL<=0.05 MIU/L-ACNC: 2.52 UIU/ML (ref 0.27–4.2)
VLDLC SERPL-MCNC: 21 MG/DL (ref 5–40)
WBC NRBC COR # BLD AUTO: 5.81 10*3/MM3 (ref 3.4–10.8)

## 2025-01-09 PROCEDURE — 83036 HEMOGLOBIN GLYCOSYLATED A1C: CPT

## 2025-01-09 PROCEDURE — 80050 GENERAL HEALTH PANEL: CPT

## 2025-01-09 PROCEDURE — 36415 COLL VENOUS BLD VENIPUNCTURE: CPT

## 2025-01-09 PROCEDURE — 80061 LIPID PANEL: CPT

## 2025-04-07 RX ORDER — ATORVASTATIN CALCIUM 20 MG/1
20 TABLET, FILM COATED ORAL NIGHTLY
Qty: 30 TABLET | Refills: 5 | Status: CANCELLED | OUTPATIENT
Start: 2025-04-07

## 2025-04-07 RX ORDER — ATORVASTATIN CALCIUM 20 MG/1
20 TABLET, FILM COATED ORAL NIGHTLY
Qty: 30 TABLET | Refills: 5 | Status: SHIPPED | OUTPATIENT
Start: 2025-04-07

## 2025-04-18 RX ORDER — LOSARTAN POTASSIUM 50 MG/1
50 TABLET ORAL 2 TIMES DAILY
Qty: 60 TABLET | Refills: 5 | Status: SHIPPED | OUTPATIENT
Start: 2025-04-18

## 2025-04-18 RX ORDER — LOSARTAN POTASSIUM 50 MG/1
50 TABLET ORAL 2 TIMES DAILY
Qty: 60 TABLET | Refills: 5 | Status: CANCELLED | OUTPATIENT
Start: 2025-04-18

## 2025-05-23 ENCOUNTER — OFFICE VISIT (OUTPATIENT)
Dept: FAMILY MEDICINE CLINIC | Facility: CLINIC | Age: 80
End: 2025-05-23
Payer: COMMERCIAL

## 2025-05-23 VITALS
HEIGHT: 68 IN | SYSTOLIC BLOOD PRESSURE: 149 MMHG | OXYGEN SATURATION: 100 % | HEART RATE: 52 BPM | RESPIRATION RATE: 16 BRPM | WEIGHT: 186 LBS | TEMPERATURE: 97.4 F | BODY MASS INDEX: 28.19 KG/M2 | DIASTOLIC BLOOD PRESSURE: 76 MMHG

## 2025-05-23 DIAGNOSIS — I48.11 LONGSTANDING PERSISTENT ATRIAL FIBRILLATION: Chronic | ICD-10-CM

## 2025-05-23 DIAGNOSIS — E78.2 MIXED HYPERLIPIDEMIA: ICD-10-CM

## 2025-05-23 DIAGNOSIS — I10 BENIGN ESSENTIAL HYPERTENSION: Primary | Chronic | ICD-10-CM

## 2025-05-23 PROCEDURE — 99214 OFFICE O/P EST MOD 30 MIN: CPT | Performed by: REGISTERED NURSE

## 2025-05-23 NOTE — PROGRESS NOTES
Subjective        Matheus Jolly is a 80 y.o. male who presents to [facility].     Chief Complaint   Patient presents with    Hypertension     6 month fl/u       History of Present Illness    Pt is here for F/U R/T chronic condition(s) and medication(s), lab test(s) and/or refill(s).    HTN - stable. Pt denies CP/SOA/HA/dizziness/pedal edema.  Hyperlipidemia - stable. Pt denies muscle cramps, N/V/D, jaundice.  A-fib - stable. Sees Dr. Mirza cardiology. Taking Eliquis BID.    The following portions of the patient's history were reviewed and updated as appropriate: allergies, current medications, past family history, past medical history, past social history, past surgical history and problem list.    No Known Allergies       Current Outpatient Medications:     apixaban (ELIQUIS) 5 MG tablet tablet, Take 1 tablet by mouth Every 12 (Twelve) Hours. Indications: Atrial Fibrillation, Disp: 180 tablet, Rfl: 1    atorvastatin (LIPITOR) 20 MG tablet, Take 1 tablet by mouth Every Night., Disp: 30 tablet, Rfl: 5    clopidogrel (PLAVIX) 75 MG tablet, Take 1 tablet by mouth Daily., Disp: 90 tablet, Rfl: 3    losartan (Cozaar) 50 MG tablet, Take 1 tablet by mouth 2 (Two) Times a Day., Disp: 60 tablet, Rfl: 5    metoprolol succinate XL (TOPROL-XL) 25 MG 24 hr tablet, Take 1 tablet by mouth Daily., Disp: 30 tablet, Rfl: 5    multivitamin with minerals tablet tablet, Take 1 tablet by mouth Every Other Day. Alternating with Vitamin B12, Disp: , Rfl:     nitroglycerin (Nitrostat) 0.4 MG SL tablet, Place 1 tablet under the tongue Every 5 (Five) Minutes As Needed for Chest Pain. Take no more than 3 doses in 15 minutes., Disp: 25 tablet, Rfl: 3    spironolactone (ALDACTONE) 25 MG tablet, Take 1 tablet by mouth Daily., Disp: 90 tablet, Rfl: 1    vitamin B-12 (CYANOCOBALAMIN) 1000 MCG tablet, Take 1 tablet by mouth Every Other Day. Alternating with Centrum, Disp: , Rfl:     Review of Systems     Objective  "    /76 (BP Location: Left arm, Patient Position: Sitting, Cuff Size: Adult)   Pulse 52   Temp 97.4 °F (36.3 °C) (Oral)   Resp 16   Ht 172.7 cm (67.99\")   Wt 84.4 kg (186 lb)   SpO2 100%   BMI 28.29 kg/m²         Physical Exam  Vitals reviewed.   Constitutional:       General: He is not in acute distress.     Appearance: Normal appearance. He is not ill-appearing.   HENT:      Mouth/Throat:      Mouth: Mucous membranes are moist.   Eyes:      General: No scleral icterus.     Conjunctiva/sclera: Conjunctivae normal.      Pupils: Pupils are equal, round, and reactive to light.   Cardiovascular:      Rate and Rhythm: Regular rhythm. Bradycardia present.      Pulses: Normal pulses.      Heart sounds: Normal heart sounds. No murmur heard.     No friction rub. No gallop.   Pulmonary:      Effort: Pulmonary effort is normal. No respiratory distress.      Breath sounds: Normal breath sounds. No wheezing or rhonchi.   Abdominal:      General: Abdomen is flat. Bowel sounds are normal.      Palpations: Abdomen is soft.      Tenderness: There is no abdominal tenderness.   Musculoskeletal:      Right lower leg: No edema.      Left lower leg: No edema.   Skin:     General: Skin is warm and dry.      Capillary Refill: Capillary refill takes 2 to 3 seconds.      Findings: No rash.   Neurological:      General: No focal deficit present.      Mental Status: He is alert and oriented to person, place, and time.      Sensory: No sensory deficit.      Motor: No weakness.      Gait: Gait normal.   Psychiatric:         Mood and Affect: Mood normal.         Behavior: Behavior normal.         Thought Content: Thought content normal.         Judgment: Judgment normal.         PHQ-2 Depression Screening  Little interest or pleasure in doing things? Not at all   Feeling down, depressed, or hopeless? Not at all   PHQ-2 Total Score 0         Result Review    The following data was reviewed by: JIMBO Palomo on " 05/23/2025:  Common labs          1/9/2025    12:42   Common Labs   Glucose 109    BUN 20    Creatinine 0.98    Sodium 139    Potassium 4.0    Chloride 102    Calcium 9.3    Albumin 4.6    Total Bilirubin 0.9    Alkaline Phosphatase 72    AST (SGOT) 21    ALT (SGPT) 15    WBC 5.81    Hemoglobin 13.3    Hematocrit 40.2    Platelets 173    Total Cholesterol 127    Triglycerides 114    HDL Cholesterol 35    LDL Cholesterol  71    Hemoglobin A1C 5.51                 Assessment & Plan    Diagnoses and all orders for this visit:    1. Benign essential hypertension (Primary)  -     Lipid Panel; Future  -     Comprehensive Metabolic Panel; Future  -     CBC & Differential; Future    2. Mixed hyperlipidemia  -     Lipid Panel; Future  -     Comprehensive Metabolic Panel; Future  -     CBC & Differential; Future    3. Longstanding persistent atrial fibrillation       Patient Instructions   Call office for lab results if you have not received a call from our office or [employer] message in 1-2 days.      Call your pharmacy for refills within 7 days of needing medication.     I highly recommend watching your dietary intake of:  fats (avoid saturated and trans fats), fried foods, simple sugars/carbs (sugary drinks, alcohol, carbs without fiber, refined sugar, starchy foods). Try to increase your intake of: good oils (olive, avocado), unsaturated fats (mono and poly), chicken and fish, broiled or roasted foods, fruits and vegetables, fiber.     Please call the office if you have any questions or concerns.    Thank you,  SUJIT Atkins    Office number:   [phone]       Follow Up   Return in about 6 months (around 11/23/2025) for Recheck.    Patient was given instructions and counseling regarding his condition or for health maintenance advice. Please see specific information pulled into the AVS if appropriate.     JIMBO Palomo     05/23/25

## 2025-05-23 NOTE — PATIENT INSTRUCTIONS
Call office for lab results if you have not received a call from our office or [employer] message in 1-2 days.      Call your pharmacy for refills within 7 days of needing medication.     I highly recommend watching your dietary intake of:  fats (avoid saturated and trans fats), fried foods, simple sugars/carbs (sugary drinks, alcohol, carbs without fiber, refined sugar, starchy foods). Try to increase your intake of: good oils (olive, avocado), unsaturated fats (mono and poly), chicken and fish, broiled or roasted foods, fruits and vegetables, fiber.     Please call the office if you have any questions or concerns.    Thank you,  SUJIT Atkins    Office number:   [phone]

## 2025-06-12 RX ORDER — METOPROLOL SUCCINATE 25 MG/1
25 TABLET, EXTENDED RELEASE ORAL DAILY
Qty: 30 TABLET | Refills: 5 | Status: SHIPPED | OUTPATIENT
Start: 2025-06-12

## 2025-07-07 RX ORDER — SPIRONOLACTONE 25 MG/1
25 TABLET ORAL DAILY
Qty: 90 TABLET | Refills: 1 | Status: SHIPPED | OUTPATIENT
Start: 2025-07-07

## (undated) DEVICE — BALN NC/EUPHORA RX 2.50X12MM

## (undated) DEVICE — CATH MPAC STP 6F: Brand: SUPER TORQUE

## (undated) DEVICE — TBG NAMIC PRESS MONTR A/ F/M 12IN

## (undated) DEVICE — CATH DIAG IMPULSE FL4 6F 100CM

## (undated) DEVICE — NC TREK™ CORONARY DILATATION CATHETER 4.5 MM X 15 MM / RAPID-EXCHANGE: Brand: NC TREK™

## (undated) DEVICE — CATH DIAG IMPULSE FR4 6F 100CM

## (undated) DEVICE — CATH GUIDE LAUNCHER EBU4.0 6F 100CM

## (undated) DEVICE — BALN NC/EUPHORA RX 2.00X20MM

## (undated) DEVICE — PK TRY HEART CATH 50

## (undated) DEVICE — ST ACC MICROPUNCTURE STFF/CANN PLAT/TP 4F 21G 40CM

## (undated) DEVICE — DEV INFL COMPAK W/ACCESSPLUS IN4530

## (undated) DEVICE — CATH DIAG IMPULSE PIG .056 6F 110CM

## (undated) DEVICE — GW RUNTHROUGH NS HYPERCOAT .014 3X180CM

## (undated) DEVICE — BOWL PLSTC MD 16OZ BLU STRL

## (undated) DEVICE — STPCK 3WY HP ROT

## (undated) DEVICE — SI AVANTI+ 6F MID W/O GW&OBT: Brand: AVANTI

## (undated) DEVICE — PINNACLE INTRODUCER SHEATH: Brand: PINNACLE

## (undated) DEVICE — ANGIO-SEAL VIP VASCULAR CLOSURE DEVICE: Brand: ANGIO-SEAL

## (undated) DEVICE — GW PTFE EMERALD HEPCOAT FC J TIP STD .035 3MM 150CM

## (undated) DEVICE — MEDICINE CUP, GRADUATED, STER: Brand: MEDLINE

## (undated) DEVICE — BALN NC/EUPHORA RX 3.00X15MM

## (undated) DEVICE — ELECTRD DEFIB M/FUNC PROPADZ RADIOL 2PK

## (undated) DEVICE — CATH IMG IVUS EAGLE EYE RAPDXNG PLAT SHT/TP 5F .014IN

## (undated) DEVICE — CONTRST ISOVUE300 61PCT 50ML